# Patient Record
Sex: FEMALE | Race: WHITE | NOT HISPANIC OR LATINO | URBAN - METROPOLITAN AREA
[De-identification: names, ages, dates, MRNs, and addresses within clinical notes are randomized per-mention and may not be internally consistent; named-entity substitution may affect disease eponyms.]

---

## 2020-11-16 ENCOUNTER — INPATIENT (INPATIENT)
Facility: HOSPITAL | Age: 75
LOS: 8 days | Discharge: HOME HEALTH | DRG: 260 | End: 2020-11-25
Attending: STUDENT IN AN ORGANIZED HEALTH CARE EDUCATION/TRAINING PROGRAM | Admitting: STUDENT IN AN ORGANIZED HEALTH CARE EDUCATION/TRAINING PROGRAM
Payer: MEDICARE

## 2020-11-16 ENCOUNTER — TRANSCRIPTION ENCOUNTER (OUTPATIENT)
Age: 75
End: 2020-11-16

## 2020-11-16 VITALS
HEIGHT: 62 IN | DIASTOLIC BLOOD PRESSURE: 102 MMHG | RESPIRATION RATE: 18 BRPM | OXYGEN SATURATION: 97 % | WEIGHT: 199.96 LBS | SYSTOLIC BLOOD PRESSURE: 194 MMHG | HEART RATE: 68 BPM | TEMPERATURE: 97 F

## 2020-11-16 DIAGNOSIS — Z90.49 ACQUIRED ABSENCE OF OTHER SPECIFIED PARTS OF DIGESTIVE TRACT: Chronic | ICD-10-CM

## 2020-11-16 DIAGNOSIS — I48.91 UNSPECIFIED ATRIAL FIBRILLATION: ICD-10-CM

## 2020-11-16 DIAGNOSIS — Z29.9 ENCOUNTER FOR PROPHYLACTIC MEASURES, UNSPECIFIED: ICD-10-CM

## 2020-11-16 DIAGNOSIS — E03.9 HYPOTHYROIDISM, UNSPECIFIED: ICD-10-CM

## 2020-11-16 DIAGNOSIS — T82.9XXA UNSPECIFIED COMPLICATION OF CARDIAC AND VASCULAR PROSTHETIC DEVICE, IMPLANT AND GRAFT, INITIAL ENCOUNTER: ICD-10-CM

## 2020-11-16 DIAGNOSIS — L03.90 CELLULITIS, UNSPECIFIED: ICD-10-CM

## 2020-11-16 DIAGNOSIS — E78.5 HYPERLIPIDEMIA, UNSPECIFIED: ICD-10-CM

## 2020-11-16 DIAGNOSIS — I10 ESSENTIAL (PRIMARY) HYPERTENSION: ICD-10-CM

## 2020-11-16 DIAGNOSIS — T82.7XXA INFECTION AND INFLAMMATORY REACTION DUE TO OTHER CARDIAC AND VASCULAR DEVICES, IMPLANTS AND GRAFTS, INITIAL ENCOUNTER: ICD-10-CM

## 2020-11-16 DIAGNOSIS — Z95.0 PRESENCE OF CARDIAC PACEMAKER: Chronic | ICD-10-CM

## 2020-11-16 LAB
ALBUMIN SERPL ELPH-MCNC: 3.9 G/DL — SIGNIFICANT CHANGE UP (ref 3.3–5)
ALP SERPL-CCNC: 78 U/L — SIGNIFICANT CHANGE UP (ref 40–120)
ALT FLD-CCNC: 14 U/L — SIGNIFICANT CHANGE UP (ref 10–45)
ANION GAP SERPL CALC-SCNC: 11 MMOL/L — SIGNIFICANT CHANGE UP (ref 5–17)
APTT BLD: 33.1 SEC — SIGNIFICANT CHANGE UP (ref 27.5–35.5)
AST SERPL-CCNC: 17 U/L — SIGNIFICANT CHANGE UP (ref 10–40)
BASOPHILS # BLD AUTO: 0.04 K/UL — SIGNIFICANT CHANGE UP (ref 0–0.2)
BASOPHILS NFR BLD AUTO: 0.4 % — SIGNIFICANT CHANGE UP (ref 0–2)
BILIRUB SERPL-MCNC: 0.2 MG/DL — SIGNIFICANT CHANGE UP (ref 0.2–1.2)
BLD GP AB SCN SERPL QL: NEGATIVE — SIGNIFICANT CHANGE UP
BUN SERPL-MCNC: 21 MG/DL — SIGNIFICANT CHANGE UP (ref 7–23)
CALCIUM SERPL-MCNC: 10.1 MG/DL — SIGNIFICANT CHANGE UP (ref 8.4–10.5)
CHLORIDE SERPL-SCNC: 105 MMOL/L — SIGNIFICANT CHANGE UP (ref 96–108)
CO2 SERPL-SCNC: 28 MMOL/L — SIGNIFICANT CHANGE UP (ref 22–31)
CREAT SERPL-MCNC: 1.17 MG/DL — SIGNIFICANT CHANGE UP (ref 0.5–1.3)
EOSINOPHIL # BLD AUTO: 0.13 K/UL — SIGNIFICANT CHANGE UP (ref 0–0.5)
EOSINOPHIL NFR BLD AUTO: 1.3 % — SIGNIFICANT CHANGE UP (ref 0–6)
GLUCOSE SERPL-MCNC: 117 MG/DL — HIGH (ref 70–99)
HCT VFR BLD CALC: 43.1 % — SIGNIFICANT CHANGE UP (ref 34.5–45)
HGB BLD-MCNC: 14.1 G/DL — SIGNIFICANT CHANGE UP (ref 11.5–15.5)
IMM GRANULOCYTES NFR BLD AUTO: 0.5 % — SIGNIFICANT CHANGE UP (ref 0–1.5)
INR BLD: 1.22 RATIO — HIGH (ref 0.88–1.16)
LYMPHOCYTES # BLD AUTO: 2.22 K/UL — SIGNIFICANT CHANGE UP (ref 1–3.3)
LYMPHOCYTES # BLD AUTO: 22.6 % — SIGNIFICANT CHANGE UP (ref 13–44)
MCHC RBC-ENTMCNC: 30.1 PG — SIGNIFICANT CHANGE UP (ref 27–34)
MCHC RBC-ENTMCNC: 32.7 GM/DL — SIGNIFICANT CHANGE UP (ref 32–36)
MCV RBC AUTO: 92.1 FL — SIGNIFICANT CHANGE UP (ref 80–100)
MONOCYTES # BLD AUTO: 0.81 K/UL — SIGNIFICANT CHANGE UP (ref 0–0.9)
MONOCYTES NFR BLD AUTO: 8.3 % — SIGNIFICANT CHANGE UP (ref 2–14)
NEUTROPHILS # BLD AUTO: 6.56 K/UL — SIGNIFICANT CHANGE UP (ref 1.8–7.4)
NEUTROPHILS NFR BLD AUTO: 66.9 % — SIGNIFICANT CHANGE UP (ref 43–77)
NRBC # BLD: 0 /100 WBCS — SIGNIFICANT CHANGE UP (ref 0–0)
PLATELET # BLD AUTO: 331 K/UL — SIGNIFICANT CHANGE UP (ref 150–400)
POTASSIUM SERPL-MCNC: 4.3 MMOL/L — SIGNIFICANT CHANGE UP (ref 3.5–5.3)
POTASSIUM SERPL-SCNC: 4.3 MMOL/L — SIGNIFICANT CHANGE UP (ref 3.5–5.3)
PROT SERPL-MCNC: 6.5 G/DL — SIGNIFICANT CHANGE UP (ref 6–8.3)
PROTHROM AB SERPL-ACNC: 14.5 SEC — HIGH (ref 10.6–13.6)
RBC # BLD: 4.68 M/UL — SIGNIFICANT CHANGE UP (ref 3.8–5.2)
RBC # FLD: 12.7 % — SIGNIFICANT CHANGE UP (ref 10.3–14.5)
RH IG SCN BLD-IMP: POSITIVE — SIGNIFICANT CHANGE UP
SARS-COV-2 RNA SPEC QL NAA+PROBE: SIGNIFICANT CHANGE UP
SODIUM SERPL-SCNC: 144 MMOL/L — SIGNIFICANT CHANGE UP (ref 135–145)
WBC # BLD: 9.81 K/UL — SIGNIFICANT CHANGE UP (ref 3.8–10.5)
WBC # FLD AUTO: 9.81 K/UL — SIGNIFICANT CHANGE UP (ref 3.8–10.5)

## 2020-11-16 PROCEDURE — 99223 1ST HOSP IP/OBS HIGH 75: CPT | Mod: GC

## 2020-11-16 PROCEDURE — 93010 ELECTROCARDIOGRAM REPORT: CPT

## 2020-11-16 PROCEDURE — 99223 1ST HOSP IP/OBS HIGH 75: CPT | Mod: 57

## 2020-11-16 PROCEDURE — 99285 EMERGENCY DEPT VISIT HI MDM: CPT | Mod: CS

## 2020-11-16 PROCEDURE — 71045 X-RAY EXAM CHEST 1 VIEW: CPT | Mod: 26

## 2020-11-16 RX ORDER — FUROSEMIDE 40 MG
30 TABLET ORAL DAILY
Refills: 0 | Status: DISCONTINUED | OUTPATIENT
Start: 2020-11-17 | End: 2020-11-17

## 2020-11-16 RX ORDER — LOSARTAN POTASSIUM 100 MG/1
100 TABLET, FILM COATED ORAL DAILY
Refills: 0 | Status: DISCONTINUED | OUTPATIENT
Start: 2020-11-16 | End: 2020-11-17

## 2020-11-16 RX ORDER — VANCOMYCIN HCL 1 G
1000 VIAL (EA) INTRAVENOUS ONCE
Refills: 0 | Status: COMPLETED | OUTPATIENT
Start: 2020-11-16 | End: 2020-11-17

## 2020-11-16 RX ORDER — VANCOMYCIN HCL 1 G
VIAL (EA) INTRAVENOUS
Refills: 0 | Status: DISCONTINUED | OUTPATIENT
Start: 2020-11-17 | End: 2020-11-17

## 2020-11-16 RX ORDER — PIPERACILLIN AND TAZOBACTAM 4; .5 G/20ML; G/20ML
3.38 INJECTION, POWDER, LYOPHILIZED, FOR SOLUTION INTRAVENOUS ONCE
Refills: 0 | Status: COMPLETED | OUTPATIENT
Start: 2020-11-16 | End: 2020-11-16

## 2020-11-16 RX ORDER — PIPERACILLIN AND TAZOBACTAM 4; .5 G/20ML; G/20ML
3.38 INJECTION, POWDER, LYOPHILIZED, FOR SOLUTION INTRAVENOUS EVERY 8 HOURS
Refills: 0 | Status: DISCONTINUED | OUTPATIENT
Start: 2020-11-16 | End: 2020-11-17

## 2020-11-16 RX ORDER — LEVOTHYROXINE SODIUM 125 MCG
75 TABLET ORAL
Refills: 0 | Status: DISCONTINUED | OUTPATIENT
Start: 2020-11-16 | End: 2020-11-17

## 2020-11-16 RX ORDER — DIGOXIN 250 MCG
0.25 TABLET ORAL DAILY
Refills: 0 | Status: DISCONTINUED | OUTPATIENT
Start: 2020-11-16 | End: 2020-11-17

## 2020-11-16 RX ORDER — PITAVASTATIN CALCIUM 1.04 MG/1
1 TABLET, FILM COATED ORAL
Qty: 0 | Refills: 0 | DISCHARGE

## 2020-11-16 RX ORDER — LEVOTHYROXINE SODIUM 125 MCG
50 TABLET ORAL
Refills: 0 | Status: DISCONTINUED | OUTPATIENT
Start: 2020-11-16 | End: 2020-11-17

## 2020-11-16 RX ORDER — VANCOMYCIN HCL 1 G
1000 VIAL (EA) INTRAVENOUS EVERY 12 HOURS
Refills: 0 | Status: DISCONTINUED | OUTPATIENT
Start: 2020-11-17 | End: 2020-11-17

## 2020-11-16 RX ORDER — TEMAZEPAM 15 MG/1
30 CAPSULE ORAL AT BEDTIME
Refills: 0 | Status: DISCONTINUED | OUTPATIENT
Start: 2020-11-16 | End: 2020-11-17

## 2020-11-16 NOTE — ED PROVIDER NOTE - CLINICAL SUMMARY MEDICAL DECISION MAKING FREE TEXT BOX
Catrina Gann MD - Attending Physician: Pt here with swelling/redness at pacer site. No chest pain, palpitations, fevers. No drainage or tenderness. Admit for pacer removal and replacement Catrina Gann MD - Attending Physician: Pt here with swelling/redness at pacer site. No chest pain, palpitations, fevers. No active drainage or tenderness. Admit for pacer removal and replacement

## 2020-11-16 NOTE — H&P ADULT - HISTORY OF PRESENT ILLNESS
75y F PMHx HTN, HLD, hypothyroid, afib (on eliquis) p/w pacemaker site infection. She had a R sided dual chamber PPM placed 2.5 years ago by a Dr. Moroe for bradycardia to 29, otherwise asymptomatic. She was without complications until 10/30 when her cardiology office [place] received a transmission from her PPM that showed "something wrong". She was advised to get the PPM evaluated and saw a Dr. Kent who noted one of the wires was loose. On11/5, he elected to put in a new wire and leave the loose wire in. However, a few days after that procedure the pt started noticing purulent discharge from the pacemaker incision site and presented to [facility]. She was prescribed a 3 day course of clindamycin but the purulent drainage did not resolved. She then called went back to [place] and received a 20 day course of levofloxacin. However, on 11/14 she noticed acute sharp R foot pain, denies trauma, bleeding, or discharge. She called Dr. Moore's office who recommended she come to [facility] for PPM removal and further evaluation.     ED course:   afebrile, HR 68, /102, RR 18, 97% on RA  Received CXR 75y F PMHx HTN, HLD, hypothyroid, afib (on eliquis) p/w pacemaker site infection. She had a R sided dual chamber PPM placed 2.5 years ago by a Dr. Moore for bradycardia to 29, otherwise asymptomatic. She was without complications until 10/30 when her cardiology office [place] received a transmission from her PPM that showed "something wrong". She was advised to get the PPM evaluated and saw a Dr. Kent who noted one of the wires was loose. On11/5, he elected to put in a new wire and leave the loose wire in. However, a few days after that procedure the pt started noticing purulent discharge from the pacemaker incision site and presented to [facility]. She was prescribed a 3 day course of clindamycin but the purulent drainage did not resolved. She then called went back to [place] and received a 20 day course of levofloxacin. However, on 11/14 she noticed acute sharp R foot pain, denies trauma, bleeding, or discharge. She called Dr. Moore's office who recommended she come to [facility] for PPM removal and further evaluation.     Of note, pt was advised to stop taking her eliquis and diltiazem on 11/13 as she may get a procedure so she has not taken these medications since 11/13 pm.     ED course:   afebrile, HR 68, /102, RR 18, 97% on RA  Received CXR 75y F PMHx HTN, HLD, hypothyroid, afib (on eliquis), 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 She had a R sided dual chamber PPM placed 2.5 years ago by a Dr. Moore for bradycardia to 29, otherwise asymptomatic. She was without complications until 10/30 when her cardiology office [place] received a transmission from her PPM that showed "something wrong". She was advised to get the PPM evaluated and saw a Dr. Kent who noted one of the wires was loose. On11/5, he elected to put in a new wire and leave the loose wire in. However, a few days after that procedure the pt started noticing purulent discharge from the pacemaker incision site and presented to [facility]. She was prescribed a 3 day course of clindamycin but the purulent drainage did not resolved. She then called went back to [place] and received a 20 day course of levofloxacin. However, on 11/14 she noticed acute sharp R foot pain, denies trauma, bleeding, or discharge. She called Dr. Moore's office who recommended she come to [facility] for PPM removal and further evaluation.     Of note, pt was advised to stop taking her eliquis and diltiazem on 11/13 as she may get a procedure so she has not taken these medications since 11/13 pm.     ED course:   afebrile, HR 68, /102, RR 18, 97% on RA  Received CXR 75y F PMHx HTN, HLD, hypothyroid, afib (on eliquis), 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 She had a R sided dual chamber PPM placed 2.5 years ago by a Dr. Moore for bradycardia to 29, otherwise asymptomatic. She was without complications until 10/30 when her cardiology office [place] received a transmission from her PPM that showed "something wrong". She was advised to get the PPM evaluated and saw a Dr. Kent who noted one of the wires was loose. On11/5, he elected to put in a new wire and leave the loose wire in. However, a few days after that procedure the pt started noticing purulent discharge from the pacemaker incision site and presented to [facility]. She was prescribed a 3 day course of clindamycin but the purulent drainage did not resolved. She then called went back to [place] and received a 20 day course of levofloxacin. However, on 11/14 she noticed acute sharp L foot pain, denies trauma, bleeding, or discharge. She called Dr. Moore's office who recommended she come to [facility] for PPM removal and further evaluation.     Of note, pt was advised to stop taking her eliquis and diltiazem on 11/13 as she may get a procedure so she has not taken these medications since 11/13 pm.     ED course:   afebrile, HR 68, /102, RR 18, 97% on RA  Received CXR 75y F PMHx HTN, HLD, hypothyroid, afib (on eliquis), 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 She had a R sided dual chamber PPM placed 2.5 years ago by a Dr. Moore for sinus bradycardia to 29, otherwise asymptomatic. She was without complications until 10/30 when her cardiology office [place] received a transmission from her PPM that showed "something wrong". She was advised to get the PPM evaluated and saw a Dr. Kent who noted one of the wires was loose. On11/5, he elected to put in a new wire and leave the loose wire in. However, a few days after that procedure the pt started noticing purulent discharge from the pacemaker incision site and presented to [facility]. She was prescribed a 3 day course of clindamycin but the purulent drainage did not resolved. She then called went back to [place] and has since completed 5 out of 20 day course of levofloxacin. On 11/14 she noticed acute sharp L 1st toe foot pain. denies trauma, bleeding, or discharge. Pt reports no hx of gout or pseudogout. She called Dr. Moore's office who recommended she come to [facility] for PPM removal and further evaluation. Pt still reports drainage from the ppm incision site.    Of note, pt was advised to stop taking her eliquis and diltiazem on 11/13 as she may get a procedure so she has not taken these medications since 11/13 pm.     ED course:   afebrile, HR 68, /102, RR 18, 97% on RA  Received CXR

## 2020-11-16 NOTE — H&P ADULT - PROBLEM SELECTOR PLAN 6
- pt takes livalo (pitavastatin) as she is intolerant to other statins- advised pt to have her daughter bring in her home dose to be verified with pharmacy and then administered - pt takes livalo (pitavastatin) as she is intolerant to other statins- advised pt to have her daughter bring in her home dose to be verified with pharmacy and then administered  - otherwise restart as outpt

## 2020-11-16 NOTE — H&P ADULT - ATTENDING COMMENTS
Pt seen and examined. Case discussed with resident. H&P reviewed and edited where appropriate. Pt seen and examined. Case discussed with resident. H&P reviewed and edited where appropriate..

## 2020-11-16 NOTE — ED ADULT NURSE NOTE - CHIEF COMPLAINT QUOTE
sent in for pacemaker replacement after infected pacemaker wire, sent in by Dr. Enoc Moore (EP); finished multiple abx.       PCP: Jeremy Blanton ([place])

## 2020-11-16 NOTE — H&P ADULT - PROBLEM SELECTOR PLAN 2
Erythema, purulent drainage from PPM site. Pt does not meet SIRS criteria. s/p clindamycin 3d, levofloxacin 5d  - c/w Abx as above  - trend WBC, fevers Erythema, purulent drainage from PPM site. Pt does not meet SIRS criteria. s/p clindamycin 3d, levofloxacin 5d  - f/u blood cx  - c/w Abx as above  - trend WBC, fevers L first metatarsal joint pain with erythema, possible gout vs pseudogout. DDx includes endocarditis given infected pacemaker pocket, recent instrumentation- less likely given pt's vitals otherwise stable, no leukocytosis, no murmur on exam, and no SOB  - pain regimen: tylenol 650 mg q6h prn  - ?f/u EP recs for NEVILLE to r/o endocarditis L first metatarsal joint pain with erythema, possible gout vs pseudogout. DDx includes less likely endocarditis given infected pacemaker pocket, recent instrumentation- pt afebrile, vitals otherwise stable, no leukocytosis, no murmur on exam, and no SOB  - pain regimen: tylenol 650 mg q6h prn  - ?f/u EP recs for NEVILLE to r/o endocarditis  - rheum consult in AM for further eval of toe  - percocet prn for pain

## 2020-11-16 NOTE — ED ADULT NURSE NOTE - OBJECTIVE STATEMENT
Patient is a 75 year old female coming into the ED via private car complaining of infected pacemaker. A&Ox4 speaking in full sentences. Patient states her MD Dr. Moore sent her to the ED because her pacemaker lead wires became infected. Patient reports her MD plans on taking her to surgery tomorrow and told her to come through the ED. Upon inspection patients right chest wall where pacemaker was inserted is red, edematous, and has pus. Patient denies pain at the right chest wall site. Bilateral breath sounds clear. No edema noted, capillary refill <2 seconds. Patient also reports new redness on the left lateral foot. Patient denies pain. Patient denies headache, chest pain, shortness of breath, N/V/D, urinary frequency/burning/hematuria.

## 2020-11-16 NOTE — H&P ADULT - ASSESSMENT
75y F PMHx HTN, HLD, hypothyroid, afib (on eliquis) p/w pacemaker site infection with c/f endocarditis. 75y F PMHx HTN, HLD, hypothyroid, afib (on eliquis), sinus 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 75y F PMHx HTN, HLD, hypothyroid, afib (on eliquis), sinus bradycardia s/p PPM p/w pacemaker site infection and toe pain.

## 2020-11-16 NOTE — ED ADULT TRIAGE NOTE - CHIEF COMPLAINT QUOTE
sent in for pacemaker replacement after infected pacemaker wire, sent in by Dr. Enoc Moore (EP); finished multiple abx.       PCP: Jeremy Blanton ([place])

## 2020-11-16 NOTE — ED PROVIDER NOTE - OBJECTIVE STATEMENT
Pt here for erythema/swelling at pacemaker site. See by Dr Moore and sent here. No chest pain, fevers, shortness of breath. No palpitations or dizziness. sent in for admission for pacer removal and replacement Pt here for erythema/swelling at pacemaker site. H/o bradycardia, s/p Pacer. Had lead migration and was wire was replaced recently. No with swelling and drainage. On abx, but continues to have erythema/swelling. Seen by Dr Moore and sent here. No chest pain, fevers, shortness of breath. No palpitations or dizziness. sent in for admission for pacer removal and replacement

## 2020-11-16 NOTE — H&P ADULT - PROBLEM SELECTOR PLAN 5
- c/w losartan 100 mg qd  - c/w lasix 30 mg qd  - pt takes coreg BID but unsure of dose- will have daughter bring in for pharmacy to verify and then administered - c/w coreg 3.125 mg BID  - c/w losartan 100 mg qd  - c/w lasix 30 mg qd

## 2020-11-16 NOTE — CONSULT NOTE ADULT - ASSESSMENT
75F with HTN, HLD, hypothyroid, afib (on eliquis) p/w pacemaker site infection.     Pacemaker site infection: Patient has a [place] PPM with RA and RV lead. Patient has an abandoned RA and abandoned RV lead (so total of 2 RA and 2 RV leads). Original RA lead was fractured and abandoned, and RV lead failed, so was capped and abandoned as well. Patient's underlying rhythm is sinus bradycardia.   - blood cultures obtained in ED  - on vanc/zosyn for empiric coverage  - please keep patient NPO after midnight for possible procedure tomorrow - discussed with patient that she may or may not go tomorrow based on schedule  - continue to hold Xarelto and hold any DVT prophylaxis in the AM  - please obtain PT/PTT/INR and type and screen in the AM    Mackenzie Rider MD  Cardiology Fellow, PGY-4  [phone]  For all other Cardiology service contact information, go to [email or web address] and use "[email or web address]" to login.

## 2020-11-16 NOTE — ED PROVIDER NOTE - RAPID ASSESSMENT
74 y/o F presents after being sent by Dr. Ioana Moore for pacemaker replacement in right chest wall. Noted to have erythema and swelling around the site. Denies CP, SOB, palpitations, SOB, fever, or chills. Cardiology is aware.     Elieser Tim (MD) note: The scribe (Mandi Hood)'s documentation has been prepared under my direction and personally reviewed by me.  Patient was seen as a tele QDOC patient, a thorough physical exam was not performed. The patient will be seen and further worked up in the main emergency department and their care will be completed by the main emergency department team. Receiving team will follow up on labs, analgesia, any clinical imaging, reassess and disposition as clinically indicated, all decisions regarding the progression of care will be made at their discretion. 76 y/o F presents after being sent by Dr. Ioana Moore for pacemaker replacement in right chest wall. Noted to have erythema and swelling around the site. Denies CP, SOB, palpitations, SOB, fever, or chills. Discussed with Cardiology fellow who was awaiting patient and is aware patient is in Emergency Department, will follow.    Labs resulted, discussed with hospitalist who will admit patient.  Will still require full HPI/ROS and exam in main Emergency Department.    Elieser Tim (MD) note: The scribe (Mandi Hood)'s documentation has been prepared under my direction and personally reviewed by me.  Patient was seen as a tele QDOC patient, a thorough physical exam was not performed. The patient will be seen and further worked up in the main emergency department and their care will be completed by the main emergency department team. Receiving team will follow up on labs, analgesia, any clinical imaging, reassess and disposition as clinically indicated, all decisions regarding the progression of care will be made at their discretion.

## 2020-11-16 NOTE — H&P ADULT - NSHPSOCIALHISTORY_GEN_ALL_CORE
Retired    Lives at home with daughter    has 2 kids, both healthy    never smoker, socially drinks (up to 1 drink/week)  denies illicit drug use

## 2020-11-16 NOTE — H&P ADULT - PROBLEM SELECTOR PLAN 4
- on eliquis but pt has held doses (last taken 11/13) in anticipation of uncoming procedure  - resume eliquis as per cardiology recs - on eliquis but pt has held doses (last taken 11/13) in anticipation of upcoming procedure  - holding AC for cardiac procedure per EP recs  - resume eliquis as per cardiology recs - on eliquis but pt has held doses (last taken 11/13) in anticipation of upcoming procedure  - holding AC for cardiac procedure per EP recs  - c/w digoxin as per cardiology recs  - resume eliquis as per cardiology recs - pt held eliquis and dilt doses (last taken 11/13) in anticipation of upcoming procedure  - holding AC for cardiac procedure per EP recs  - c/w digoxin, coreg  - resume eliquis and dilt once cleared by cards

## 2020-11-16 NOTE — H&P ADULT - NSHPREVIEWOFSYSTEMS_GEN_ALL_CORE
REVIEW OF SYSTEMS:    CONSTITUTIONAL: No weakness, fevers or chills  EYES/ENT: No visual changes;  No vertigo or throat pain   NECK: No pain or stiffness  RESPIRATORY: No cough, wheezing, hemoptysis; No shortness of breath  CARDIOVASCULAR: No chest pain or palpitations  GASTROINTESTINAL: No abdominal or epigastric pain. No nausea, vomiting, or hematemesis; No diarrhea or constipation. No melena or hematochezia.  GENITOURINARY: No dysuria, frequency or hematuria  NEUROLOGICAL: No numbness or weakness  SKIN: +purulent drainage from PPM site. No itching, burning, rashes, or lesions   All other review of systems is negative unless indicated above. REVIEW OF SYSTEMS:    CONSTITUTIONAL: No weakness, fevers or chills  EYES/ENT: No visual changes;  No vertigo or throat pain   NECK: No pain or stiffness  RESPIRATORY: No cough, wheezing, hemoptysis; No shortness of breath  CARDIOVASCULAR: No chest pain or palpitations  GASTROINTESTINAL: No abdominal or epigastric pain. No nausea, vomiting, or hematemesis; No diarrhea or constipation. No melena or hematochezia.  GENITOURINARY: No dysuria, frequency or hematuria  NEUROLOGICAL: No numbness or weakness  SKIN: +previous purulent drainage from PPM site. +L foot pain. No itching, burning, rashes, or lesions   PSYCH: no anxiety, no depression  All other review of systems is negative unless indicated above.

## 2020-11-16 NOTE — ED PROVIDER NOTE - CARDIAC, MLM
Normal rate, regular rhythm.  Heart sounds S1, S2.  No murmurs, rubs or gallops. Swelling and mild erythema at pacemaker site, no fluctuance, no drainage. Steri-strips in place. Nontender

## 2020-11-16 NOTE — CHART NOTE - NSCHARTNOTEFT_GEN_A_CORE
Reference #: [id number]    Patient Name: Sharri Guzman Date: 1945  Address: [address] [place] [id number]: Female  Rx Written	Rx Dispensed	Drug	Quantity	Days Supply	Prescriber Name	Payment Method	Dispenser  10/19/2020	10/23/2020	temazepam 30 mg capsule	30	30	Harvinder Agastin	Insurance	[facility] #[id number]  09/21/2020	09/23/2020	temazepam 30 mg capsule	30	30	Harvinder Agastin	[place]	[facility] #[id number]  08/27/2020	08/29/2020	oxycodone-acetaminophen 5-325 mg tablet	48	8	Brennen Rosales	Medicare	[facility] #[id number]  08/21/2020	08/21/2020	oxycodone hcl 5 mg tablet	28	7	Sadi Allen P	Insurance	[facility] #[id number]  08/21/2020	08/21/2020	diazepam 5 mg tablet	15	5	Sdai Allen P	[place]	[facility] #[id number]  07/31/2020	07/31/2020	tramadol hcl 50 mg tablet	60	10	Radames Martin PA	Insurance	[facility] #[id number]  07/30/2020	07/31/2020	temazepam 30 mg capsule	30	30	Harvinder Agastin	[place]	[facility] #[id number]  07/15/2020	07/15/2020	tramadol hcl 50 mg tablet	60	10	Artemio Barroso	Insurance	[facility] #[id number]  07/02/2020	07/02/2020	temazepam 30 mg capsule	30	30	Harvinder, Agastin	[place]	[facility] #[id number]  05/29/2020	06/02/2020	temazepam 30 mg capsule	30	30	Harvinder, Agastin	[place]	[facility] #[id number]  05/04/2020	05/04/2020	temazepam 30 mg capsule	30	30	Harvinder, Agastin	[place]	[facility] #[id number]  04/08/2020	04/14/2020	hydrocodone-acetaminophen 5-325 mg tablet	90	30	Nichols, Cy	Insurance	[facility] #[id number]  04/07/2020	04/07/2020	hydrocodone-acetaminophen 5-325 mg tablet	14	7	Nichols, Cy	Insurance	[facility] #[id number]  03/31/2020	04/02/2020	temazepam 30 mg capsule	30	30	Harvinder, Agastin	Insurance	[facility] #[id number]  02/28/2020	03/02/2020	temazepam 30 mg capsule	30	30	Harvinder, Agastin	Insurance	[facility] #[id number]  01/02/2020	01/03/2020	temazepam 30 mg capsule	30	30	Jeremy Blanton	[facility] #[id number]  11/27/2019	12/02/2019	temazepam 30 mg capsule	30	30	[facility] #[id number]

## 2020-11-16 NOTE — H&P ADULT - PROBLEM SELECTOR PLAN 3
c/f endocarditis given infected pacemaker pocket, recent instrumentation, and new onset sharp R foot pain with erythema c/f septic emboli. However, less likely given pt's vitals otherwise stable, no leukocytosis, no murmur on exam, and no SOB  - f/u cardiology recs regarding NEVILLE   - c/w ABx as above Pt currently asymtpomatic  - f/u EP recs regarding new PPM placement Pt currently asymtpomatic, in a-paced rhythm  - ?sinus node dysfxn  - f/u ep recs and need for interrogation

## 2020-11-16 NOTE — H&P ADULT - PROBLEM SELECTOR PLAN 1
likely PPM site infection given recent instrumentation, insertion of new wire. Incision site is erythematous with purulent drainage but pt not meeting sepsis criteria  - cardiology aware pt is here- appreciate recs  - start vanc/zosyn (11/16- ) likely PPM site infection given recent instrumentation, insertion of new wire. Incision site is erythematous with purulent drainage but pt not meeting sepsis criteria  - EP following, plan for PPM removal/exchange 11/17  - f/u blood cx  - start vanc/zosyn (11/16- ) likely PPM site infection given recent instrumentation, insertion of new wire. Incision site is erythematous with reportedly purulent drainage but pt not meeting sepsis criteria. s/p clindamycin 3d, levofloxacin 5d. Now awaiting revision per EP.   - EP following, plan for PPM removal/exchange 11/17  - f/u blood cx  - start vanc/zosyn (11/16- )  - ?f/u EP recs for NEVILLE to r/o endocarditis  - trend WBC, fevers  - consider ID consult if infection does not resolve with current regimen/EP intervention likely PPM site infection given recent instrumentation, insertion of new wire. Incision site is erythematous with reportedly purulent drainage but pt not meeting sepsis criteria. s/p clindamycin 3d, levofloxacin 5d. Now awaiting revision per EP.   - EP following, plan for PPM removal/exchange 11/17  - f/u blood cx  - start vanc/zosyn (11/16- )  - ?f/u EP recs for need for NEVILLE to r/o endocarditis  - trend WBC, fevers

## 2020-11-16 NOTE — ED PROVIDER NOTE - CHIEF COMPLAINT
The patient is a 75y Female complaining of The patient is a 75y Female complaining of Pacer malfunction

## 2020-11-16 NOTE — H&P ADULT - PROBLEM SELECTOR PLAN 7
- f/u TSH  - pt takes levothyroxine 50 mcg one day followed by 75 mcg the next day  - c/w current regimen - f/u TSH  - pt takes levothyroxine 50 mcg one day followed by 75 mcg the next day  - c/w as per pt's home regimen

## 2020-11-16 NOTE — H&P ADULT - NSHPLABSRESULTS_GEN_ALL_CORE
11-16    144  |  105  |  21  ----------------------------<  117<H>  4.3   |  28  |  1.17    Ca    10.1      16 Nov 2020 19:14    TPro  6.5  /  Alb  3.9  /  TBili  0.2  /  DBili  x   /  AST  17  /  ALT  14  /  AlkPhos  78  11-16    PT/INR - ( 16 Nov 2020 19:14 )   PT: 14.5 sec;   INR: 1.22 ratio       PTT - ( 16 Nov 2020 19:14 )  PTT:33.1 sec                           14.1   9.81  )-----------( 331      ( 16 Nov 2020 19:14 )             43.1     EKG 11/16/20:  atrial paced rhythm with prolonged AV conduction  Right bundle branch block  Abnormal EKG Personally reviewed imaging, labs, ekg.    11-16  144  |  105  |  21  ----------------------------<  117<H>  4.3   |  28  |  1.17    Ca    10.1      16 Nov 2020 19:14    TPro  6.5  /  Alb  3.9  /  TBili  0.2  /  DBili  x   /  AST  17  /  ALT  14  /  AlkPhos  78  11-16    PT/INR - ( 16 Nov 2020 19:14 )   PT: 14.5 sec;   INR: 1.22 ratio       PTT - ( 16 Nov 2020 19:14 )  PTT:33.1 sec                           14.1   9.81  )-----------( 331      ( 16 Nov 2020 19:14 )             43.1     EKG 11/16/20:  atrial paced rhythm with prolonged AV conduction  Right bundle branch block  Abnormal EKG

## 2020-11-16 NOTE — CONSULT NOTE ADULT - SUBJECTIVE AND OBJECTIVE BOX
Patient seen and evaluated at bedside    Chief Complaint:    HPI:  75F with HTN, HLD, hypothyroid, afib (on eliquis) p/w pacemaker site infection.     The patient originally had a [employer]onic (or possibly Biotronik) PPM placed 10-12 years ago in [place], however she required a generator change, so now she has a [employer] PPM that was placed June 2018. She does not know if the leads were exchanged as well at that time. She was told on 10/30 from a remote device check that something was wrong, so she was Dr. Kent (EP) who stated that the "wire was loose", so he placed a new RV lead, and left the old lead in. A few days later, the patient started having purulent discharge and some pain at her pacemaker site. She went to [facility], and was prescribed a 3 day course of clinda, without improvement. She returned, and then received a 20 day course of levaquin. 2 days prior to admission, she then started having left foot pain near her 1st metatarsal. She was then told to go to [facility] for her device infection / told Dr. Moore would take out her device. Currently she is still having L foot pain, which is worse than her pacemaker site pain. Of note, patient has not taken her Xarelto or diltiazem since Thursday in preparation for this procedure.     ED course:   afebrile, HR 68, /102, RR 18, 97% on RA  Received CXR       Outpatient cardiologist: Dr. Kent (EP)    PMHx:   Hypothyroid    Afib    HLD (hyperlipidemia)    HTN (hypertension)    PSHx:   History of appendectomy    H/O cardiac pacemaker    Allergies:  latex (Rash)  No Known Drug Allergies      Home Meds:  livalo  synthroid 50/75  valsartan 160  coreg 3.125  temazepam 30  digoxin 250 mcg 5 days a week  terazosin  lasix 20 qd     Current Medications:       FAMILY HISTORY:  FHx: Alzheimer&#x27;s disease    FHx: myocardial infarction    Social History: retired .  Smoking History: never  Alcohol Use: social  Drug Use: none    REVIEW OF SYSTEMS:  CONSTITUTIONAL: No weakness, fevers or chills  EYES/ENT: No visual changes;  No dysphagia  NECK: No pain or stiffness  RESPIRATORY: No cough, wheezing, hemoptysis; No shortness of breath  CARDIOVASCULAR: +chest wall tenderness, no palpitations; No lower extremity edema  GASTROINTESTINAL: No abdominal or epigastric pain. No nausea, vomiting, or hematemesis; No diarrhea or constipation. No melena or hematochezia.  BACK: No back pain  MSK: + L foot pain  GENITOURINARY: No dysuria, frequency or hematuria  NEUROLOGICAL: No numbness or weakness  SKIN: No itching, burning, rashes, or lesions   All other review of systems is negative unless indicated above.    Physical Exam:  T(F): 98.5 (11-16), Max: 98.5 (11-16)  HR: 59 (11-16) (59 - 68)  BP: 163/92 (11-16) (163/92 - 194/102)  RR: 17 (11-16)  SpO2: 100% (11-16)  GENERAL: No acute distress, well-developed  HEAD:  Atraumatic, Normocephalic  ENT: EOMI, PERRLA, conjunctiva and sclera clear, Neck supple, No JVD, moist mucosa  CHEST/LUNG: Clear to auscultation bilaterally; No wheeze, equal breath sounds bilaterally. Erythema of R chest wall, inferolateral to pacemaker generator, without active drainage  BACK: No spinal tenderness  HEART: Regular rate and rhythm; No murmurs, rubs, or gallops  ABDOMEN: Soft, Nontender, Nondistended; Bowel sounds present  EXTREMITIES:  No clubbing, cyanosis, or edema. Warmth and erythema of L 1st metatarsal  PSYCH: Nl behavior, nl affect  NEUROLOGY: AAOx3, non-focal, cranial nerves intact  SKIN: Normal color, No rashes or lesions  LINES:    Cardiovascular Diagnostic Testing:    ECG: Personally reviewed: AV paced    CXR: Personally reviewed - appears to have 2 RA and 2 RV leads.     Labs: Personally reviewed                        14.1   9.81  )-----------( 331      ( 16 Nov 2020 19:14 )             43.1     11-16    144  |  105  |  21  ----------------------------<  117<H>  4.3   |  28  |  1.17    Ca    10.1      16 Nov 2020 19:14    TPro  6.5  /  Alb  3.9  /  TBili  0.2  /  DBili  x   /  AST  17  /  ALT  14  /  AlkPhos  78  11-16    PT/INR - ( 16 Nov 2020 19:14 )   PT: 14.5 sec;   INR: 1.22 ratio         PTT - ( 16 Nov 2020 19:14 )  PTT:33.1 sec

## 2020-11-16 NOTE — H&P ADULT - NSHPPHYSICALEXAM_GEN_ALL_CORE
Vital Signs Last 24 Hrs  T(C): 36.3 (16 Nov 2020 18:43), Max: 36.3 (16 Nov 2020 18:43)  T(F): 97.4 (16 Nov 2020 18:43), Max: 97.4 (16 Nov 2020 18:43)  HR: 68 (16 Nov 2020 18:43) (68 - 68)  BP: 194/102 (16 Nov 2020 18:43) (194/102 - 194/102)  BP(mean): --  RR: 18 (16 Nov 2020 18:43) (18 - 18)  SpO2: 97% (16 Nov 2020 18:43) (97% - 97%)    CONSTITUTIONAL: No acute distress. Awake and alert.  HEAD: No evidence of trauma. Structures WNL.  EYES: +PERRL. +EOMI. No scleral icterus. No conjunctival injection.  ENT: Moist oral mucosa. No erythema. No pharyngeal exudates.   NECK: Supple. Appropriate ROM. No stiffness. No masses or lymphadenopathy.  RESPIRATORY: CTAB. No wheezes, rales, or rhonchi. No accessory muscle use. No apparent respiratory distress.  CARDIOVASCULAR: +S1/S2. No audible S3/S4. Regular rate and rhythm. No murmurs, rubs, or gallops. 2+ radial pulses x b/l UE; 2+ DP pulses x b/l LE.   GASTROINTESTINAL: Soft, nontender, nondistended. +BS. No rebound or guarding.   BACK: No spinal or paraspinal tenderness. No CVA tenderness.  EXTREMITY: +R distal medial foot has area of erythema, tender to palpation. No LE swelling or edema. EXTs warm to touch.  MUSCULOSKELETAL: Spontaneous movement in all extremities.  DERMATOLOGICAL: +R anterior chest wall pacemaker site incision has surrounding erythema, brownish drainage noted. Covered in soaked steri strips. No area of fluctuance noted on exam, nontender to palpation. No abnormal rashes or lesions.  NEUROLOGICAL: CN 2-12 grossly intact. No focal deficits. Sensation intact x 4EXT. A&Ox3 (oriented to person, place, and time).  PSYCHIATRIC: Appropriate affect. Vital Signs Last 24 Hrs  T(C): 36.3 (16 Nov 2020 18:43), Max: 36.3 (16 Nov 2020 18:43)  T(F): 97.4 (16 Nov 2020 18:43), Max: 97.4 (16 Nov 2020 18:43)  HR: 68 (16 Nov 2020 18:43) (68 - 68)  BP: 194/102 (16 Nov 2020 18:43) (194/102 - 194/102)  BP(mean): --  RR: 18 (16 Nov 2020 18:43) (18 - 18)  SpO2: 97% (16 Nov 2020 18:43) (97% - 97%)    CONSTITUTIONAL: No acute distress. Awake and alert.  HEAD: No evidence of trauma. Structures WNL.  EYES: +PERRL. +EOMI. No scleral icterus. No conjunctival injection.  ENT: Moist oral mucosa. No erythema. No pharyngeal exudates.   NECK: Supple. Appropriate ROM. No stiffness. No masses or lymphadenopathy.  RESPIRATORY: CTAB. No wheezes, rales, or rhonchi. No accessory muscle use. No apparent respiratory distress.  CARDIOVASCULAR: +S1/S2. No audible S3/S4. Regular rate and rhythm. No murmurs, rubs, or gallops. 2+ radial pulses x b/l UE; 2+ DP pulses x b/l LE.   GASTROINTESTINAL: Soft, nontender, nondistended. +BS. No rebound or guarding.   BACK: No spinal or paraspinal tenderness. No CVA tenderness.  EXTREMITY: +L distal medial foot has area of erythema, tender to palpation. No LE swelling or edema. EXTs warm to touch.  MUSCULOSKELETAL: Spontaneous movement in all extremities.  DERMATOLOGICAL: +R anterior chest wall pacemaker site incision has surrounding erythema, minor brownish drainage noted. Covered in soaked steri strips. No area of fluctuance noted on exam, nontender to palpation. No abnormal rashes or lesions.  NEUROLOGICAL: CN 2-12 grossly intact. No focal deficits. Sensation intact x 4EXT. A&Ox3 (oriented to person, place, and time).  PSYCHIATRIC: Appropriate affect. Vital Signs Last 24 Hrs  T(C): 36.3 (16 Nov 2020 18:43), Max: 36.3 (16 Nov 2020 18:43)  T(F): 97.4 (16 Nov 2020 18:43), Max: 97.4 (16 Nov 2020 18:43)  HR: 68 (16 Nov 2020 18:43) (68 - 68)  BP: 194/102 (16 Nov 2020 18:43) (194/102 - 194/102)  BP(mean): --  RR: 18 (16 Nov 2020 18:43) (18 - 18)  SpO2: 97% (16 Nov 2020 18:43) (97% - 97%)    CONSTITUTIONAL: No acute distress. Awake and alert.  HEAD: No evidence of trauma. Structures WNL.  EYES: +PERRL. +EOMI. No scleral icterus. No conjunctival injection.  ENT: Moist oral mucosa. No erythema. No pharyngeal exudates.   NECK: Supple. Appropriate ROM. No stiffness. No masses or lymphadenopathy.  RESPIRATORY: CTAB. No wheezes, rales, or rhonchi. No accessory muscle use. No apparent respiratory distress.  CARDIOVASCULAR: +S1/S2. No audible S3/S4. Regular rate and rhythm. No murmurs, rubs, or gallops. 2+ radial pulses x b/l UE; 2+ DP pulses x b/l LE.   GASTROINTESTINAL: Soft, nontender, nondistended. +BS. No rebound or guarding.   BACK: No spinal or paraspinal tenderness. No CVA tenderness.  EXTREMITY: +L leg circumference >R, this is b/l per pt. +L distal medial foot has area of erythema, tender to palpation. No LE swelling or edema. EXTs warm to touch.  MUSCULOSKELETAL: Spontaneous movement in all extremities.  DERMATOLOGICAL: +R anterior chest wall pacemaker site incision has surrounding erythema, minor brownish drainage noted. Covered in soaked steri strips. No area of fluctuance noted on exam, nontender to palpation. No abnormal rashes or lesions.  NEUROLOGICAL: CN 2-12 grossly intact. No focal deficits. Sensation intact x 4EXT. A&Ox3 (oriented to person, place, and time).  PSYCHIATRIC: Appropriate affect. Vital Signs Last 24 Hrs  T(C): 36.3 (16 Nov 2020 18:43), Max: 36.3 (16 Nov 2020 18:43)  T(F): 97.4 (16 Nov 2020 18:43), Max: 97.4 (16 Nov 2020 18:43)  HR: 68 (16 Nov 2020 18:43) (68 - 68)  BP: 194/102 (16 Nov 2020 18:43) (194/102 - 194/102)  BP(mean): --  RR: 18 (16 Nov 2020 18:43) (18 - 18)  SpO2: 97% (16 Nov 2020 18:43) (97% - 97%)    CONSTITUTIONAL: No acute distress. Awake and alert.  HEAD: No evidence of trauma. Structures WNL.  EYES: +PERRL. +EOMI. No scleral icterus. No conjunctival injection.  ENT: Moist oral mucosa. No erythema. No pharyngeal exudates.   NECK: Supple. Appropriate ROM. No stiffness. No masses or lymphadenopathy.  RESPIRATORY: CTAB. No wheezes, rales, or rhonchi. No accessory muscle use. No apparent respiratory distress.  CARDIOVASCULAR: +S1/S2. No audible S3/S4. Regular rate and rhythm. No murmurs, rubs, or gallops. 2+ radial pulses x b/l UE; 2+ DP pulses x b/l LE.   GASTROINTESTINAL: Soft, nontender, nondistended. +BS. No rebound or guarding.   BACK: No spinal or paraspinal tenderness. No CVA tenderness.  EXTREMITY: +L leg circumference >R, this is b/l per pt. +L distal medial 1st toe of foot has area of mild erythema, tender to palpation. No LE swelling or edema. EXTs warm to touch.  MUSCULOSKELETAL: Spontaneous movement in all extremities.  DERMATOLOGICAL: +R anterior chest wall pacemaker site incision has surrounding erythema, minor brownish drainage noted. Covered in soaked steri strips. No area of fluctuance noted on exam, nontender to palpation. No abnormal rashes or lesions.  NEUROLOGICAL: CN 2-12 grossly intact. No focal deficits. Sensation intact x 4EXT. A&Ox3 (oriented to person, place, and time).  PSYCHIATRIC: Appropriate affect.

## 2020-11-17 DIAGNOSIS — M79.672 PAIN IN LEFT FOOT: ICD-10-CM

## 2020-11-17 DIAGNOSIS — R00.1 BRADYCARDIA, UNSPECIFIED: ICD-10-CM

## 2020-11-17 PROBLEM — Z00.00 ENCOUNTER FOR PREVENTIVE HEALTH EXAMINATION: Status: ACTIVE | Noted: 2020-11-17

## 2020-11-17 LAB
A1C WITH ESTIMATED AVERAGE GLUCOSE RESULT: 5.8 % — HIGH (ref 4–5.6)
ALBUMIN SERPL ELPH-MCNC: 3.7 G/DL — SIGNIFICANT CHANGE UP (ref 3.3–5)
ALP SERPL-CCNC: 73 U/L — SIGNIFICANT CHANGE UP (ref 40–120)
ALT FLD-CCNC: 11 U/L — SIGNIFICANT CHANGE UP (ref 10–45)
ANION GAP SERPL CALC-SCNC: 12 MMOL/L — SIGNIFICANT CHANGE UP (ref 5–17)
ANION GAP SERPL CALC-SCNC: 17 MMOL/L — SIGNIFICANT CHANGE UP (ref 5–17)
AST SERPL-CCNC: 15 U/L — SIGNIFICANT CHANGE UP (ref 10–40)
BILIRUB SERPL-MCNC: 0.7 MG/DL — SIGNIFICANT CHANGE UP (ref 0.2–1.2)
BUN SERPL-MCNC: 17 MG/DL — SIGNIFICANT CHANGE UP (ref 7–23)
BUN SERPL-MCNC: 21 MG/DL — SIGNIFICANT CHANGE UP (ref 7–23)
CALCIUM SERPL-MCNC: 9.1 MG/DL — SIGNIFICANT CHANGE UP (ref 8.4–10.5)
CALCIUM SERPL-MCNC: 9.7 MG/DL — SIGNIFICANT CHANGE UP (ref 8.4–10.5)
CHLORIDE SERPL-SCNC: 101 MMOL/L — SIGNIFICANT CHANGE UP (ref 96–108)
CHLORIDE SERPL-SCNC: 105 MMOL/L — SIGNIFICANT CHANGE UP (ref 96–108)
CO2 SERPL-SCNC: 24 MMOL/L — SIGNIFICANT CHANGE UP (ref 22–31)
CO2 SERPL-SCNC: 25 MMOL/L — SIGNIFICANT CHANGE UP (ref 22–31)
CREAT SERPL-MCNC: 1.01 MG/DL — SIGNIFICANT CHANGE UP (ref 0.5–1.3)
CREAT SERPL-MCNC: 1.07 MG/DL — SIGNIFICANT CHANGE UP (ref 0.5–1.3)
ESTIMATED AVERAGE GLUCOSE: 120 MG/DL — HIGH (ref 68–114)
GLUCOSE SERPL-MCNC: 101 MG/DL — HIGH (ref 70–99)
GLUCOSE SERPL-MCNC: 111 MG/DL — HIGH (ref 70–99)
HCT VFR BLD CALC: 40.2 % — SIGNIFICANT CHANGE UP (ref 34.5–45)
HCT VFR BLD CALC: 42.9 % — SIGNIFICANT CHANGE UP (ref 34.5–45)
HCV AB S/CO SERPL IA: 0.06 S/CO — SIGNIFICANT CHANGE UP (ref 0–0.99)
HCV AB SERPL-IMP: SIGNIFICANT CHANGE UP
HGB BLD-MCNC: 13.3 G/DL — SIGNIFICANT CHANGE UP (ref 11.5–15.5)
HGB BLD-MCNC: 14 G/DL — SIGNIFICANT CHANGE UP (ref 11.5–15.5)
MAGNESIUM SERPL-MCNC: 1.8 MG/DL — SIGNIFICANT CHANGE UP (ref 1.6–2.6)
MAGNESIUM SERPL-MCNC: 1.9 MG/DL — SIGNIFICANT CHANGE UP (ref 1.6–2.6)
MCHC RBC-ENTMCNC: 30 PG — SIGNIFICANT CHANGE UP (ref 27–34)
MCHC RBC-ENTMCNC: 30.2 PG — SIGNIFICANT CHANGE UP (ref 27–34)
MCHC RBC-ENTMCNC: 32.6 GM/DL — SIGNIFICANT CHANGE UP (ref 32–36)
MCHC RBC-ENTMCNC: 33.1 GM/DL — SIGNIFICANT CHANGE UP (ref 32–36)
MCV RBC AUTO: 91.4 FL — SIGNIFICANT CHANGE UP (ref 80–100)
MCV RBC AUTO: 91.9 FL — SIGNIFICANT CHANGE UP (ref 80–100)
NRBC # BLD: 0 /100 WBCS — SIGNIFICANT CHANGE UP (ref 0–0)
NRBC # BLD: 0 /100 WBCS — SIGNIFICANT CHANGE UP (ref 0–0)
PHOSPHATE SERPL-MCNC: 3.9 MG/DL — SIGNIFICANT CHANGE UP (ref 2.5–4.5)
PHOSPHATE SERPL-MCNC: 4.4 MG/DL — SIGNIFICANT CHANGE UP (ref 2.5–4.5)
PLATELET # BLD AUTO: 302 K/UL — SIGNIFICANT CHANGE UP (ref 150–400)
PLATELET # BLD AUTO: 304 K/UL — SIGNIFICANT CHANGE UP (ref 150–400)
POTASSIUM SERPL-MCNC: 3.8 MMOL/L — SIGNIFICANT CHANGE UP (ref 3.5–5.3)
POTASSIUM SERPL-MCNC: 3.9 MMOL/L — SIGNIFICANT CHANGE UP (ref 3.5–5.3)
POTASSIUM SERPL-SCNC: 3.8 MMOL/L — SIGNIFICANT CHANGE UP (ref 3.5–5.3)
POTASSIUM SERPL-SCNC: 3.9 MMOL/L — SIGNIFICANT CHANGE UP (ref 3.5–5.3)
PROT SERPL-MCNC: 6.4 G/DL — SIGNIFICANT CHANGE UP (ref 6–8.3)
RBC # BLD: 4.4 M/UL — SIGNIFICANT CHANGE UP (ref 3.8–5.2)
RBC # BLD: 4.67 M/UL — SIGNIFICANT CHANGE UP (ref 3.8–5.2)
RBC # FLD: 12.8 % — SIGNIFICANT CHANGE UP (ref 10.3–14.5)
RBC # FLD: 12.9 % — SIGNIFICANT CHANGE UP (ref 10.3–14.5)
SODIUM SERPL-SCNC: 142 MMOL/L — SIGNIFICANT CHANGE UP (ref 135–145)
SODIUM SERPL-SCNC: 142 MMOL/L — SIGNIFICANT CHANGE UP (ref 135–145)
WBC # BLD: 9.28 K/UL — SIGNIFICANT CHANGE UP (ref 3.8–10.5)
WBC # BLD: 9.68 K/UL — SIGNIFICANT CHANGE UP (ref 3.8–10.5)
WBC # FLD AUTO: 9.28 K/UL — SIGNIFICANT CHANGE UP (ref 3.8–10.5)
WBC # FLD AUTO: 9.68 K/UL — SIGNIFICANT CHANGE UP (ref 3.8–10.5)

## 2020-11-17 PROCEDURE — 99233 SBSQ HOSP IP/OBS HIGH 50: CPT

## 2020-11-17 PROCEDURE — 33235 REMOVAL PACEMAKER ELECTRODE: CPT

## 2020-11-17 PROCEDURE — 33233 REMOVAL OF PM GENERATOR: CPT

## 2020-11-17 PROCEDURE — 71045 X-RAY EXAM CHEST 1 VIEW: CPT | Mod: 26

## 2020-11-17 PROCEDURE — 99232 SBSQ HOSP IP/OBS MODERATE 35: CPT | Mod: 25

## 2020-11-17 PROCEDURE — 99223 1ST HOSP IP/OBS HIGH 75: CPT | Mod: GC

## 2020-11-17 RX ORDER — LEVOTHYROXINE SODIUM 125 MCG
50 TABLET ORAL
Refills: 0 | Status: CANCELLED | OUTPATIENT
Start: 2020-11-18 | End: 2020-11-17

## 2020-11-17 RX ORDER — LEVOTHYROXINE SODIUM 125 MCG
50 TABLET ORAL
Refills: 0 | Status: DISCONTINUED | OUTPATIENT
Start: 2020-11-18 | End: 2020-11-25

## 2020-11-17 RX ORDER — METOPROLOL TARTRATE 50 MG
5 TABLET ORAL ONCE
Refills: 0 | Status: COMPLETED | OUTPATIENT
Start: 2020-11-17 | End: 2020-11-17

## 2020-11-17 RX ORDER — METOPROLOL TARTRATE 50 MG
25 TABLET ORAL ONCE
Refills: 0 | Status: COMPLETED | OUTPATIENT
Start: 2020-11-17 | End: 2020-11-17

## 2020-11-17 RX ORDER — ACETAMINOPHEN 500 MG
650 TABLET ORAL ONCE
Refills: 0 | Status: COMPLETED | OUTPATIENT
Start: 2020-11-17 | End: 2020-11-17

## 2020-11-17 RX ORDER — CARVEDILOL PHOSPHATE 80 MG/1
3.12 CAPSULE, EXTENDED RELEASE ORAL EVERY 12 HOURS
Refills: 0 | Status: DISCONTINUED | OUTPATIENT
Start: 2020-11-17 | End: 2020-11-17

## 2020-11-17 RX ORDER — BENZOCAINE AND MENTHOL 5; 1 G/100ML; G/100ML
1 LIQUID ORAL
Refills: 0 | Status: DISCONTINUED | OUTPATIENT
Start: 2020-11-17 | End: 2020-11-25

## 2020-11-17 RX ORDER — HYDRALAZINE HCL 50 MG
5 TABLET ORAL ONCE
Refills: 0 | Status: COMPLETED | OUTPATIENT
Start: 2020-11-17 | End: 2020-11-17

## 2020-11-17 RX ORDER — LEVOTHYROXINE SODIUM 125 MCG
50 TABLET ORAL DAILY
Refills: 0 | Status: DISCONTINUED | OUTPATIENT
Start: 2020-11-17 | End: 2020-11-17

## 2020-11-17 RX ORDER — CHLORHEXIDINE GLUCONATE 213 G/1000ML
1 SOLUTION TOPICAL
Refills: 0 | Status: DISCONTINUED | OUTPATIENT
Start: 2020-11-17 | End: 2020-11-17

## 2020-11-17 RX ORDER — COLCHICINE 0.6 MG
0.6 TABLET ORAL EVERY 24 HOURS
Refills: 0 | Status: DISCONTINUED | OUTPATIENT
Start: 2020-11-17 | End: 2020-11-17

## 2020-11-17 RX ORDER — CHLORHEXIDINE GLUCONATE 213 G/1000ML
1 SOLUTION TOPICAL AT BEDTIME
Refills: 0 | Status: DISCONTINUED | OUTPATIENT
Start: 2020-11-17 | End: 2020-11-25

## 2020-11-17 RX ORDER — OXYCODONE AND ACETAMINOPHEN 5; 325 MG/1; MG/1
1 TABLET ORAL EVERY 6 HOURS
Refills: 0 | Status: DISCONTINUED | OUTPATIENT
Start: 2020-11-17 | End: 2020-11-17

## 2020-11-17 RX ORDER — HYDROMORPHONE HYDROCHLORIDE 2 MG/ML
0.5 INJECTION INTRAMUSCULAR; INTRAVENOUS; SUBCUTANEOUS
Refills: 0 | Status: DISCONTINUED | OUTPATIENT
Start: 2020-11-17 | End: 2020-11-17

## 2020-11-17 RX ORDER — OXYCODONE HYDROCHLORIDE 5 MG/1
5 TABLET ORAL ONCE
Refills: 0 | Status: DISCONTINUED | OUTPATIENT
Start: 2020-11-17 | End: 2020-11-17

## 2020-11-17 RX ORDER — OXYCODONE AND ACETAMINOPHEN 5; 325 MG/1; MG/1
1 TABLET ORAL EVERY 4 HOURS
Refills: 0 | Status: DISCONTINUED | OUTPATIENT
Start: 2020-11-17 | End: 2020-11-17

## 2020-11-17 RX ORDER — FENTANYL CITRATE 50 UG/ML
25 INJECTION INTRAVENOUS
Refills: 0 | Status: DISCONTINUED | OUTPATIENT
Start: 2020-11-17 | End: 2020-11-17

## 2020-11-17 RX ORDER — VANCOMYCIN HCL 1 G
1000 VIAL (EA) INTRAVENOUS EVERY 12 HOURS
Refills: 0 | Status: DISCONTINUED | OUTPATIENT
Start: 2020-11-17 | End: 2020-11-20

## 2020-11-17 RX ORDER — COLCHICINE 0.6 MG
1.2 TABLET ORAL ONCE
Refills: 0 | Status: COMPLETED | OUTPATIENT
Start: 2020-11-17 | End: 2020-11-17

## 2020-11-17 RX ORDER — LEVOTHYROXINE SODIUM 125 MCG
75 TABLET ORAL
Refills: 0 | Status: DISCONTINUED | OUTPATIENT
Start: 2020-11-19 | End: 2020-11-25

## 2020-11-17 RX ORDER — HYDRALAZINE HCL 50 MG
25 TABLET ORAL THREE TIMES A DAY
Refills: 0 | Status: DISCONTINUED | OUTPATIENT
Start: 2020-11-17 | End: 2020-11-17

## 2020-11-17 RX ORDER — ACETAMINOPHEN 500 MG
1000 TABLET ORAL ONCE
Refills: 0 | Status: DISCONTINUED | OUTPATIENT
Start: 2020-11-17 | End: 2020-11-17

## 2020-11-17 RX ADMIN — PIPERACILLIN AND TAZOBACTAM 25 GRAM(S): 4; .5 INJECTION, POWDER, LYOPHILIZED, FOR SOLUTION INTRAVENOUS at 13:52

## 2020-11-17 RX ADMIN — OXYCODONE HYDROCHLORIDE 5 MILLIGRAM(S): 5 TABLET ORAL at 03:02

## 2020-11-17 RX ADMIN — Medication 1 MILLIGRAM(S): at 03:02

## 2020-11-17 RX ADMIN — LOSARTAN POTASSIUM 100 MILLIGRAM(S): 100 TABLET, FILM COATED ORAL at 05:08

## 2020-11-17 RX ADMIN — OXYCODONE HYDROCHLORIDE 5 MILLIGRAM(S): 5 TABLET ORAL at 04:51

## 2020-11-17 RX ADMIN — BENZOCAINE AND MENTHOL 1 LOZENGE: 5; 1 LIQUID ORAL at 23:37

## 2020-11-17 RX ADMIN — Medication 5 MILLIGRAM(S): at 23:37

## 2020-11-17 RX ADMIN — Medication 250 MILLIGRAM(S): at 12:08

## 2020-11-17 RX ADMIN — PIPERACILLIN AND TAZOBACTAM 25 GRAM(S): 4; .5 INJECTION, POWDER, LYOPHILIZED, FOR SOLUTION INTRAVENOUS at 07:58

## 2020-11-17 RX ADMIN — Medication 650 MILLIGRAM(S): at 02:22

## 2020-11-17 RX ADMIN — Medication 25 MILLIGRAM(S): at 21:50

## 2020-11-17 RX ADMIN — CARVEDILOL PHOSPHATE 3.12 MILLIGRAM(S): 80 CAPSULE, EXTENDED RELEASE ORAL at 01:32

## 2020-11-17 RX ADMIN — Medication 30 MILLIGRAM(S): at 05:09

## 2020-11-17 RX ADMIN — Medication 1.2 MILLIGRAM(S): at 13:09

## 2020-11-17 RX ADMIN — PIPERACILLIN AND TAZOBACTAM 200 GRAM(S): 4; .5 INJECTION, POWDER, LYOPHILIZED, FOR SOLUTION INTRAVENOUS at 00:41

## 2020-11-17 RX ADMIN — Medication 650 MILLIGRAM(S): at 01:32

## 2020-11-17 RX ADMIN — Medication 75 MICROGRAM(S): at 05:08

## 2020-11-17 RX ADMIN — Medication 5 MILLIGRAM(S): at 21:58

## 2020-11-17 RX ADMIN — Medication 0.25 MILLIGRAM(S): at 05:09

## 2020-11-17 RX ADMIN — Medication 250 MILLIGRAM(S): at 01:16

## 2020-11-17 NOTE — CONSULT NOTE ADULT - ASSESSMENT
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 She had a R sided dual chamber PPM placed 2.5 years ago by a Dr. Moore for sinus bradycardia to 29, otherwise asymptomatic. Pt has new pacemaker lead placed at [facility] on 11/5. The wound started to get infected with pus coming out after a week. She took 3 days of clindamycin it helped the infection but after stopping clindamycin it went back again. She then was called back to take levofloxacin. She developed left first toe podagra despite never had hx of gout. She does not have any systemic symptoms. Feeling well in general. She does not have leukocytosis or fever.    Per EP note, she has SSS, s/p ILR, s/p right sided PPM with capped Biotronik RA/RV leads from 2009, new RA lead 2014, generator change ([facility]) 2018 and RV lead revision 11/5/20 p/w pacemaker pocket infection.     #Pacemaker pocket infection:  - with pus and greenish drainage from the wound  - on vanc/zosyn  - EP plans to NPO her for PPM system extraction in the OR later today  - follow up BCx  - send for OR culture to decide abx selection    #Left first toe podagra: likely gout  - Care per primary team     75y F PMHx HTN, HLD, hypothyroid, afib (on eliquis), 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 She had a R sided dual chamber PPM placed 2.5 years ago by a Dr. Moore for sinus bradycardia to 29, otherwise asymptomatic. Pt has new pacemaker lead placed at [facility] on 11/5. The wound started to get infected with pus coming out after a week. She took 3 days of clindamycin it helped the infection but after stopping clindamycin it went back again. She then was called back to take levofloxacin. She developed left first toe podagra despite never had hx of gout. She does not have any systemic symptoms. Feeling well in general. She does not have leukocytosis or fever.    Per EP note, she has SSS, s/p ILR, s/p right sided PPM with capped [employer]ik RA/RV leads from 2009, new RA lead 2014, generator change ([facility]J) 2018 and RV lead revision 11/5/20 p/w pacemaker pocket infection.     #Pacemaker pocket infection:  - with pus and greenish drainage from the wound  - continue on vanc/zosyn  - EP plans to NPO her for PPM system extraction in the OR later today  - follow up BCx  - send for OR culture to decide abx selection    #Left first toe podagra: likely gout  - Care per primary team    Alexandra Oneill MD, PGY4   ID fellow  Pager: [phone]  After 5pm/weekends call [phone]    d/w Dr. Vera Carl conveyed to primary team

## 2020-11-17 NOTE — PROGRESS NOTE ADULT - SUBJECTIVE AND OBJECTIVE BOX
HOSPITALIST NOTE    Dr. Tamir Heck DO  Attending Physician  Division of Hospital Medicine  [facility]  Pager:  [phone]    SUBJECTIVE  Reports left 1st MTP pain with movement.   Some soreness noted at right chest ppm site.  No other complaints.      REVIEW OF SYSTEMS  12 point review of systems negative except for items noted above.      PAST MEDICAL & SURGICAL HISTORY:  Hypothyroid    Afib    HLD (hyperlipidemia)    HTN (hypertension)    History of appendectomy    H/O cardiac pacemaker        MEDICATIONS  (STANDING):  colchicine 0.6 milliGRAM(s) Oral every 24 hours  furosemide    Tablet 30 milliGRAM(s) Oral daily  levothyroxine 75 MICROGram(s) Oral <User Schedule>  losartan 100 milliGRAM(s) Oral daily  piperacillin/tazobactam IVPB.. 3.375 Gram(s) IV Intermittent every 8 hours  vancomycin  IVPB      vancomycin  IVPB 1000 milliGRAM(s) IV Intermittent every 12 hours    MEDICATIONS  (PRN):  oxycodone    5 mG/acetaminophen 325 mG 1 Tablet(s) Oral every 6 hours PRN Moderate Pain (4 - 6)  temazepam 30 milliGRAM(s) Oral at bedtime PRN Insomnia      Allergies    latex (Rash)  penicillin (Rash (Mild))    Intolerances        T(C): 36.6 (11-17-20 @ 04:45), Max: 36.9 (11-16-20 @ 23:20)  T(F): 97.9 (11-17-20 @ 04:45), Max: 98.5 (11-16-20 @ 23:20)  HR: 60 (11-17-20 @ 04:45) (59 - 68)  BP: 143/84 (11-17-20 @ 04:45) (138/64 - 194/102)  ABP: --  ABP(mean): --  RR: 18 (11-17-20 @ 04:45) (17 - 18)  SpO2: 97% (11-17-20 @ 04:45) (97% - 100%)      CONSTITUTIONAL: No acute distress.   HEENT:  Conjunctiva clear B/L. Nasal mucosa normal. Moist oral mucosa. No posterior pharyngeal lesions noted.  Cardiovascular: RRR with no murmurs. No JVD noted. No lower extremity edema B/L. Extremities are warm and well perfused. Radial pulses 2+ B/L. Dorsalis pedis pulses 2+ B/L.    Respiratory: Lungs CTAB. No accessory muscle use.   Gastrointestinal:  Soft, nontender. Non-distended. Non-rigid. No CVA tenderness B/L.  MSK:  No joint swelling. No joint erythema B/L. No midline spinal tenderness.  Neurologic:  Alert and awake. Oriented x3. Moving all extremities. Following commands. Making eye contact.    Skin:  Right chest wall PPM site with steristrip   Psych:  Normal affect. Normal Mood.     LABS                        13.3   9.28  )-----------( 304      ( 17 Nov 2020 06:16 )             40.2     11-17    142  |  105  |  21  ----------------------------<  101<H>  3.8   |  25  |  1.07    Ca    9.7      17 Nov 2020 06:16  Phos  4.4     11-17  Mg     1.9     11-17    TPro  6.5  /  Alb  3.9  /  TBili  0.2  /  DBili  x   /  AST  17  /  ALT  14  /  AlkPhos  78  11-16    PT/INR - ( 16 Nov 2020 19:14 )   PT: 14.5 sec;   INR: 1.22 ratio         PTT - ( 16 Nov 2020 19:14 )  PTT:33.1 sec      ADDITIONAL STUDIES PERSONALLY REVIEWED   HOSPITALIST NOTE    Dr. Tamir Heck DO  Attending Physician  Division of Hospital Medicine  [facility]  Pager:  [phone]    SUBJECTIVE  Reports left 1st MTP pain with movement.   Some soreness noted at right chest ppm site.  No other complaints.      REVIEW OF SYSTEMS  12 point review of systems negative except for items noted above.      PAST MEDICAL & SURGICAL HISTORY:  Hypothyroid    Afib    HLD (hyperlipidemia)    HTN (hypertension)    History of appendectomy    H/O cardiac pacemaker        MEDICATIONS  (STANDING):  colchicine 0.6 milliGRAM(s) Oral every 24 hours  furosemide    Tablet 30 milliGRAM(s) Oral daily  levothyroxine 75 MICROGram(s) Oral <User Schedule>  losartan 100 milliGRAM(s) Oral daily  piperacillin/tazobactam IVPB.. 3.375 Gram(s) IV Intermittent every 8 hours  vancomycin  IVPB      vancomycin  IVPB 1000 milliGRAM(s) IV Intermittent every 12 hours    MEDICATIONS  (PRN):  oxycodone    5 mG/acetaminophen 325 mG 1 Tablet(s) Oral every 6 hours PRN Moderate Pain (4 - 6)  temazepam 30 milliGRAM(s) Oral at bedtime PRN Insomnia      Allergies    latex (Rash)  penicillin (Rash (Mild))    Intolerances        T(C): 36.6 (11-17-20 @ 04:45), Max: 36.9 (11-16-20 @ 23:20)  T(F): 97.9 (11-17-20 @ 04:45), Max: 98.5 (11-16-20 @ 23:20)  HR: 60 (11-17-20 @ 04:45) (59 - 68)  BP: 143/84 (11-17-20 @ 04:45) (138/64 - 194/102)  ABP: --  ABP(mean): --  RR: 18 (11-17-20 @ 04:45) (17 - 18)  SpO2: 97% (11-17-20 @ 04:45) (97% - 100%)      CONSTITUTIONAL: No acute distress.   HEENT:  Conjunctiva clear B/L. Moist oral mucosa.   Cardiovascular: RRR with no murmurs. No JVD noted. No lower extremity edema B/L. Extremities are warm and well perfused. Radial pulses 2+ B/L. Dorsalis pedis pulses 2+ B/L.    Respiratory: Lungs CTAB. No accessory muscle use.   Gastrointestinal:  Soft, nontender. Non-distended. Non-rigid. No CVA tenderness B/L.  MSK:  left 1st mtp with erythema and tenderness with palpation and movement.   Neurologic:  Alert and awake. Oriented x3. Moving all extremities. Following commands. Making eye contact.    Skin:  Right chest wall PPM site with steri-strips on. Serosanguinous drainage / mild erythema / no clear fluctuance.   Psych:  Normal affect. Normal Mood.     LABS                        13.3   9.28  )-----------( 304      ( 17 Nov 2020 06:16 )             40.2     11-17    142  |  105  |  21  ----------------------------<  101<H>  3.8   |  25  |  1.07    Ca    9.7      17 Nov 2020 06:16  Phos  4.4     11-17  Mg     1.9     11-17    TPro  6.5  /  Alb  3.9  /  TBili  0.2  /  DBili  x   /  AST  17  /  ALT  14  /  AlkPhos  78  11-16    PT/INR - ( 16 Nov 2020 19:14 )   PT: 14.5 sec;   INR: 1.22 ratio         PTT - ( 16 Nov 2020 19:14 )  PTT:33.1 sec      ADDITIONAL STUDIES PERSONALLY REVIEWED

## 2020-11-17 NOTE — PROGRESS NOTE ADULT - PROBLEM SELECTOR PLAN 6
- pt takes livalo (pitavastatin) as she is intolerant to other statins- advised pt to have her daughter bring in her home dose to be verified with pharmacy and then administered  - otherwise restart as outpt

## 2020-11-17 NOTE — PROGRESS NOTE ADULT - ASSESSMENT
75 year old female with PMHx HTN, HLD, hypothyroid, Afib (on xarelto), SSS, s/p ILR, s/p PPM with capped Bi[employer]ronik RA/RV leads from 2009, new RA lead 2014, generator change ([facility]) 2018 and RV lead revision 11/5/20 p/w pacemaker pocket infection.     1. PPM pocket infection  2. SSS s/p PPM  3. PAF w/ controlled ventricular rate    --PPM interrogation ([facility] [[email or web address] revealed since 11/5/2020: A pace 78%, V pace 84%, total AT/AF burden 7.9% and PAF w/ controlled ventricular rates, not PPM dependent with underlying rhythm SR in the 50's.   --Hold all AV ana maria agents: coreg and Digoxin d/c'd this am and off cardizem 300mg since 11/13/20  --Hold AC (off xarelto since 11/13/20)  --f/u blood culture (result pending)  --NPO for PPM system extraction in the OR later today    ELVA Morales, SITA  [phone]   75 year old female with PMHx HTN, HLD, hypothyroid, Afib (on xarelto), SSS, s/p ILR, s/p right sided PPM with capped Biotronik RA/RV leads from 2009, new RA lead 2014, generator change ([facility]J) 2018 and RV lead revision 11/5/20 p/w pacemaker pocket infection.     1. PPM pocket infection  2. SSS s/p PPM  3. PAF w/ controlled ventricular rate  4. Left foot gout (possible)    --PPM interrogation ([facility]) revealed since 11/5/2020: A pace 78%, V pace 84%, total AT/AF burden 7.9% and PAF w/ controlled ventricular rates, not PPM dependent with underlying rhythm SR in the 50's.   --Hold all AV ana maria agents: coreg and Digoxin d/c'd this am and off cardizem 300mg since 11/13/20  --Hold AC (off xarelto since 11/13/20)  --f/u blood culture (result pending)  --Rheum consult for possible left foot gout   --NPO for PPM system extraction in the OR later today    ELVA Morales, SITA  [phone]

## 2020-11-17 NOTE — PROCEDURE NOTE - ADDITIONAL PROCEDURE DETAILS
1) Indication for interrogation: PPM pocket infection   2) Presenting rhythm: A pace/V sense with intermittent AV pacing at 60's   3) Measured data WNL, NL PM function, Pt is not PM dependent  4) Since 11/5/2020: A pace 78%, V pace 84%, total AT/AF burden 7.9%  5) Stored data revealed 22 atrial high rate episodes between 11/16/20-11/17/20 lasting 12 seconds to 2 mins and 4 sec, review of available EGMs consistent with PAF with controlled ventricular rates.   6) Changes made: none  7) Plan is for PPM system extraction in the OR later today    Device information:  Generator: [employer] PM [id number], implanted 6/4/2018  RA lead (active): Biotronik [id number], implanted 9/4/2014  RV lead (active): [employer] [id number], implanted 11/5/2020  RA lead (capped): [employer]ik [id number], implanted 12/4/2009  RV lead (capped): [employer]ik [id number], implanted 12/4/2009    ELVA Morales, NP  [phone]

## 2020-11-17 NOTE — PROGRESS NOTE ADULT - PROBLEM SELECTOR PLAN 4
- pt held eliquis and dilt doses (last taken 11/13) in anticipation of upcoming procedure  - holding AC for cardiac procedure per EP recs  - c/w digoxin, coreg  - resume eliquis and dilt once cleared by cards Doac, coreg, dig, and dilt all held for planned procedure. Recommendations by EP appreciated.

## 2020-11-17 NOTE — PROVIDER CONTACT NOTE (OTHER) - REASON
Patient baseline SR/ HR 60's with 3 episodes of converting to AF lasting 5 min ('s), 3 min than 2 min. Patient self converted to SR vpaced now.

## 2020-11-17 NOTE — CONSULT NOTE ADULT - SUBJECTIVE AND OBJECTIVE BOX
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 She had a R sided dual chamber PPM placed 2.5 years ago by a Dr. Moore for sinus bradycardia to 29, otherwise asymptomatic. She was without complications until 10/30 when her cardiology office [facility] received a transmission from her PPM that showed "something wrong". She was advised to get the PPM evaluated and saw a Dr. Kent who noted one of the wires was loose. On, he elected to put in a new wire and leave the loose wire in. However, a few days after that procedure the pt started noticing purulent discharge from the pacemaker incision site and presented to [facility]. She was prescribed a 3 day course of clindamycin but the purulent drainage did not resolved. She then called went back to [place] and has since completed 5 out of 20 day course of levofloxacin. On  she noticed acute sharp L 1st toe foot pain. denies trauma, bleeding, or discharge. Pt reports no hx of gout or pseudogout. She called Dr. Moore's office who recommended she come to [facility] for PPM removal and further evaluation. Pt still reports drainage from the ppm incision site.    Of note, pt was advised to stop taking her eliquis and diltiazem on  as she may get a procedure so she has not taken these medications since  pm.     ED course:   afebrile, HR 68, /102, RR 18, 97% on RA  Received CXR (2020 23:02)    Pt has new pacemaker lead placed at [facility] on . The wound started to get infected with pus coming out after a week. She took 3 days of clindamycin it helped the infection but after stopping clindamycin it went back again. She then was called back to take levofloxacin.    She developed left first toe podagra despite never had hx of gout.    She does not have any systemic symptoms. Feeling well in general.       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 Has 5 cats at home, but she does not usually go downstairs, it's her children who play with the cats. She denies any cat scratch or bite.    FAMILY HISTORY:  - Father  of MI at 46 yo ([place])  - Mother  at 92 yo relatively healthy    Allergies  latex (Rash)  Pt stated that she is allergic to PCN when 3 yo she had rash, however, she tolerated Zosyn in our hospital    ANTIMICROBIALS:  piperacillin/tazobactam IVPB.. 3.375 every 8 hours  vancomycin  IVPB    vancomycin  IVPB 1000 every 12 hours      ANTIMICROBIALS (past 90 days):  MEDICATIONS  (STANDING):  piperacillin/tazobactam IVPB.   200 mL/Hr IV Intermittent (20 @ 00:41)    piperacillin/tazobactam IVPB..   25 mL/Hr IV Intermittent (20 @ 07:58)    vancomycin  IVPB   250 mL/Hr IV Intermittent (20 @ 01:16)    OTHER MEDS:   MEDICATIONS  (STANDING):  furosemide    Tablet 30 daily  levothyroxine 75 <User Schedule>  losartan 100 daily  oxycodone    5 mG/acetaminophen 325 mG 1 every 6 hours PRN  temazepam 30 at bedtime PRN      REVIEW OF SYSTEMS  [  ] ROS unobtainable because:    [V  ] All other systems negative except as noted below: right upper chest pacemaker site wound with pus and drainage, left foot first toe podagra    Constitutional:  [ ] fever [ ] chills  [ ] weight loss  [ ] weakness  Skin:  [ ] rash [ ] phlebitis	  Eyes: [ ] icterus [ ] pain  [ ] discharge	  ENMT: [ ] sore throat  [ ] thrush [ ] ulcers [ ] exudates  Respiratory: [ ] dyspnea [ ] hemoptysis [ ] cough [ ] sputum	  Cardiovascular:  [ ] chest pain [ ] palpitations [ ] edema	  Gastrointestinal:  [ ] nausea [ ] vomiting [ ] diarrhea [ ] constipation [ ] pain	  Genitourinary:  [ ] dysuria [ ] frequency [ ] hematuria [ ] discharge [ ] flank pain  [ ] incontinence  Musculoskeletal:  [ ] myalgias [V ] arthralgias [ ] arthritis  [ ] back pain  Neurological:  [ ] headache [ ] seizures  [ ] confusion/altered mental status  Psychiatric:  [ ] anxiety [ ] depression	  Hematology/Lymphatics:  [ ] lymphadenopathy  Endocrine:  [ ] adrenal [ ] thyroid  Allergic/Immunologic:	 [ ] transplant [ ] seasonal    VITALS:  Vital Signs Last 24 Hrs  T(F): 97.9 (20 @ 04:45), Max: 98.5 (20 @ 23:20)    Vital Signs Last 24 Hrs  HR: 60 (20 @ 04:45) (59 - 68)  BP: 143/84 (20 @ 04:45) (138/64 - 194/102)  RR: 18 (20 @ 04:45)  SpO2: 97% (20 @ 04:45) (97% - 100%)  Wt(kg): --    EXAM:  GA: NAD  HEENT: oral cavity no lesion  CV: nl S1/S2, no RMG  Lungs: CTAB  Abd: BS+, soft, nontender, no rebounding pain  Ext: no edema  Skin: no rash  IV: no phlebitis  right upper chest pacemaker site wound with pus and drainage, underneath the skin is slightly warm but is erythematous  left foot first toe podagra+ tender to touch      Labs:                        13.3   9.28  )-----------( 304      ( 2020 06:16 )             40.2         142  |  105  |  21  ----------------------------<  101<H>  3.8   |  25  |  1.07    Ca    9.7      2020 06:16  Phos  4.4       Mg     1.9         TPro  6.5  /  Alb  3.9  /  TBili  0.2  /  DBili  x   /  AST  17  /  ALT  14  /  AlkPhos  78        WBC Trend:  WBC Count: 9.28 (20 @ 06:16)  WBC Count: 9.81 (20 @ 19:14)      Auto Neutrophil #: 6.56 K/uL (20 @ 19:14)      Creatine Trend:  Creatinine, Serum: 1.07 ()  Creatinine, Serum: 1.17 ()      Liver Biochemical Testing Trend:  Alanine Aminotransferase (ALT/SGPT): 14 ()  Aspartate Aminotransferase (AST/SGOT): 17 (20 @ 19:14)  Bilirubin Total, Serum: 0.2 ()      MICROBIOLOGY:  BCx: received at  3:30am    OTHER TESTS:  COVID-19 PCR: NotDetec (20 @ 19:14)  A1C with Estimated Average Glucose Result: 5.8 % (20 @ 10:58)      RADIOLOGY:  imaging below personally reviewed    < from: Xray Chest 1 View AP/PA (20 @ 20:37) >  Impression:    The heart is enlarged. The lungs appear to be clear. No pleural effusion. No pneumothorax. A pacer is seen on the right and the tip of the electrodes are in the right atrium and right ventricular. A left atrial appendage clip is present.    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 She had a R sided dual chamber PPM placed 2.5 years ago by a Dr. Moore for sinus bradycardia to 29, otherwise asymptomatic. She was without complications until 10/30 when her cardiology office [facility] received a transmission from her PPM that showed "something wrong". She was advised to get the PPM evaluated and saw a Dr. Kent who noted one of the wires was loose. On, he elected to put in a new wire and leave the loose wire in. However, a few days after that procedure the pt started noticing purulent discharge from the pacemaker incision site and presented to [facility]. She was prescribed a 3 day course of clindamycin but the purulent drainage did not resolved. She then called went back to [place] and has since completed 5 out of 20 day course of levofloxacin. On  she noticed acute sharp L 1st toe foot pain. denies trauma, bleeding, or discharge. Pt reports no hx of gout or pseudogout. She called Dr. Moore's office who recommended she come to [facility] for PPM removal and further evaluation. Pt still reports drainage from the ppm incision site.    Of note, pt was advised to stop taking her eliquis and diltiazem on  as she may get a procedure so she has not taken these medications since  pm.     ED course:   afebrile, HR 68, /102, RR 18, 97% on RA  Received CXR (2020 23:02)    Pt has new pacemaker lead placed at [facility] on . The wound started to get infected with pus coming out after a week. She took 3 days of clindamycin it helped the infection but after stopping clindamycin it went back again. She then was called back to take levofloxacin.    She developed left first toe podagra despite never had hx of gout.    She does not have any systemic symptoms. Feeling well in general.     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 Has 5 cats at home, but she does not usually go downstairs, it's her children who play with the cats. She denies any cat scratch or bite.    FAMILY HISTORY:  - Father  of MI at 44 yo ([place]  - Mother  at 94 yo relatively healthy    Allergies  latex (Rash)  Pt stated that she is allergic to PCN when 5 yo she had rash, however, she tolerated Zosyn in our hospital    ANTIMICROBIALS:  piperacillin/tazobactam IVPB.. 3.375 every 8 hours  vancomycin  IVPB    vancomycin  IVPB 1000 every 12 hours    ANTIMICROBIALS (past 90 days):  MEDICATIONS  (STANDING):  piperacillin/tazobactam IVPB.   200 mL/Hr IV Intermittent (20 @ 00:41)    piperacillin/tazobactam IVPB..   25 mL/Hr IV Intermittent (20 @ 07:58)    vancomycin  IVPB   250 mL/Hr IV Intermittent (20 @ 01:16)    OTHER MEDS:   MEDICATIONS  (STANDING):  furosemide    Tablet 30 daily  levothyroxine 75 <User Schedule>  losartan 100 daily  oxycodone    5 mG/acetaminophen 325 mG 1 every 6 hours PRN  temazepam 30 at bedtime PRN      REVIEW OF SYSTEMS  [  ] ROS unobtainable because:    [V  ] All other systems negative except as noted below: right upper chest pacemaker site wound with pus and drainage, left foot first toe podagra    Constitutional:  [ ] fever [ ] chills  [ ] weight loss  [ ] weakness  Skin:  [ ] rash [ ] phlebitis	  Eyes: [ ] icterus [ ] pain  [ ] discharge	  ENMT: [ ] sore throat  [ ] thrush [ ] ulcers [ ] exudates  Respiratory: [ ] dyspnea [ ] hemoptysis [ ] cough [ ] sputum	  Cardiovascular:  [ ] chest pain [ ] palpitations [ ] edema	  Gastrointestinal:  [ ] nausea [ ] vomiting [ ] diarrhea [ ] constipation [ ] pain	  Genitourinary:  [ ] dysuria [ ] frequency [ ] hematuria [ ] discharge [ ] flank pain  [ ] incontinence  Musculoskeletal:  [ ] myalgias [V ] arthralgias [ ] arthritis  [ ] back pain  Neurological:  [ ] headache [ ] seizures  [ ] confusion/altered mental status  Psychiatric:  [ ] anxiety [ ] depression	  Hematology/Lymphatics:  [ ] lymphadenopathy  Endocrine:  [ ] adrenal [ ] thyroid  Allergic/Immunologic:	 [ ] transplant [ ] seasonal    VITALS:  Vital Signs Last 24 Hrs  T(F): 97.9 (20 @ 04:45), Max: 98.5 (20 @ 23:20)    Vital Signs Last 24 Hrs  HR: 60 (20 @ 04:45) (59 - 68)  BP: 143/84 (20 @ 04:45) (138/64 - 194/102)  RR: 18 (20 @ 04:45)  SpO2: 97% (20 @ 04:45) (97% - 100%)  Wt(kg): --    EXAM:  GA: NAD  HEENT: oral cavity no lesion  CV: nl S1/S2, no RMG  Lungs: CTAB  Abd: BS+, soft, nontender, no rebounding pain  Ext: no edema  Skin: no rash  IV: no phlebitis  right upper chest pacemaker site wound with pus and drainage, underneath the skin is slightly warm but is erythematous  left foot first toe podagra+ tender to touch      Labs:                        13.3   9.28  )-----------( 304      ( 2020 06:16 )             40.2         142  |  105  |  21  ----------------------------<  101<H>  3.8   |  25  |  1.07    Ca    9.7      2020 06:16  Phos  4.4       Mg     1.9         TPro  6.5  /  Alb  3.9  /  TBili  0.2  /  DBili  x   /  AST  17  /  ALT  14  /  AlkPhos  78        WBC Trend:  WBC Count: 9.28 (20 @ 06:16)  WBC Count: 9.81 (20 @ 19:14)      Auto Neutrophil #: 6.56 K/uL (20 @ 19:14)      Creatine Trend:  Creatinine, Serum: 1.07 ()  Creatinine, Serum: 1.17 ()      Liver Biochemical Testing Trend:  Alanine Aminotransferase (ALT/SGPT): 14 ()  Aspartate Aminotransferase (AST/SGOT): 17 (20 @ 19:14)  Bilirubin Total, Serum: 0.2 ()      MICROBIOLOGY:  BCx: received at  3:30am    OTHER TESTS:  COVID-19 PCR: NotDetec (20 @ 19:14)  A1C with Estimated Average Glucose Result: 5.8 % (20 @ 10:58)      RADIOLOGY:  imaging below personally reviewed    < from: Xray Chest 1 View AP/PA (20 @ 20:37) >  Impression:    The heart is enlarged. The lungs appear to be clear. No pleural effusion. No pneumothorax. A pacer is seen on the right and the tip of the electrodes are in the right atrium and right ventricular. A left atrial appendage clip is present.    < end of copied text >

## 2020-11-17 NOTE — PROGRESS NOTE ADULT - PROBLEM SELECTOR PLAN 1
likely PPM site infection given recent instrumentation, insertion of new wire. Incision site is erythematous with reportedly purulent drainage but pt not meeting sepsis criteria. s/p clindamycin 3d, levofloxacin 5d. Now awaiting revision per EP.   - EP following, plan for PPM removal/exchange 11/17  - f/u blood cx  - start vanc/zosyn (11/16- )  - ?f/u EP recs for need for NEVILLE to r/o endocarditis  - trend WBC, fevers - EP following, plan for PPM removal/exchange 11/17  - f/u blood cx  - resume vanc/zosyn (11/16- )  - monitor   - ?f/u EP recs for need for NEVILLE to r/o endocarditis  - trend WBC, fevers - EP following, plan for PPM removal/exchange 11/17  - f/u blood cx  - resume vanc/zosyn (11/16- )  - monitor vanco level and RFTs.   - F/u OR cx data.  - ID on board.

## 2020-11-17 NOTE — CHART NOTE - NSCHARTNOTEFT_GEN_A_CORE
CCU Accept Note    Transfer from: (  ) Medicine    ( x ) Telemetry    (  ) RCU                                        (  ) Palliative     (  ) Stroke Unit    ( ) _______________    Accepting Physician: Dr. 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 She had a R sided dual chamber PPM placed 2.5 years ago by a Dr. Moore for sinus bradycardia to 29, otherwise asymptomatic. She was without complications until 10/30 when her cardiology office [place] received a transmission from her PPM that showed "something wrong". She was advised to get the PPM evaluated and saw a Dr. Kent who noted one of the wires was loose. On11/5, he elected to put in a new wire and leave the loose wire in. However, a few days after that procedure the pt started noticing purulent discharge from the pacemaker incision site and presented to [facility]. She was prescribed a 3 day course of clindamycin but the purulent drainage did not resolved. She then called went back to [place] and has since completed 5 out of 20 day course of levofloxacin. On 11/14 she noticed acute sharp L 1st toe foot pain. denies trauma, bleeding, or discharge. Pt reports no hx of gout or pseudogout. She called Dr. Moore's office who recommended she come to [facility] for PPM removal and further evaluation. Pt still reports drainage from the ppm incision site. Of note, pt was advised to stop taking her eliquis and diltiazem on 11/13 as she may get a procedure so she has not taken these medications since 11/13 pm.    Patient was started on vancomycin and zosyn, narrowed to vancomycin only and planned for removal on 11/17. She was treated for acute gout flare in her L right great toe. The pacemaker pocket was opened, all leads freed and anchors removed. She developed atrial fibrillation and underwent DC CV. In the PACU, she had HR to 180 and BP to 220s. She was given lopressor 5 mg IV and metoprolol tartrate 25 mg once. She was transferred to CICU for monitoring and treatment of aflutter. Presently she complains of chest pain at the site of her pocket wound and a sore throat following extubation. Otherwise, she has no acute complaints. Her right groin is without pain. She has left toe pain.    REVIEW OF SYSTEMS:   No fevers, chest pain, shortness of breath, abdominal pain, lower extremity swelling or pain, dysuria, or constipation.    MEDICATIONS  (STANDING):  chlorhexidine 2% Cloths 1 Application(s) Topical at bedtime  hydrALAZINE Injectable 5 milliGRAM(s) IV Push once  vancomycin  IVPB 1000 milliGRAM(s) IV Intermittent every 12 hours    MEDICATIONS  (PRN):  benzocaine 15 mG/menthol 3.6 mG (Sugar-Free) Lozenge 1 Lozenge Oral four times a day PRN Sore Throat      Allergies    latex (Rash)  penicillin (Rash (Mild))    Intolerances        Vital Signs Last 24 Hrs  T(C): 37.2 (17 Nov 2020 22:45), Max: 37.2 (17 Nov 2020 22:45)  T(F): 99 (17 Nov 2020 22:45), Max: 99 (17 Nov 2020 22:45)  HR: 120 (17 Nov 2020 23:00) (59 - 132)  BP: 176/105 (17 Nov 2020 22:45) (115/73 - 190/107)  BP(mean): 144 (17 Nov 2020 22:45) (124 - 145)  RR: 17 (17 Nov 2020 23:00) (16 - 20)  SpO2: 95% (17 Nov 2020 23:00) (95% - 100%)    I&O's Summary      Physical Exam:  General: Well-appearing, NAD  HEENT: PERRL, EOMI, normal sclera and conjunctiva, normal oropharynx  Neck: Supple, no JVD, thyroid without masses or enlargement  Chest/Lungs: CTA bilaterally, no wheezing, rales, rhonchi or rub. Right chest wound with wound vac intact, swollen. Tender to palpation.  Heart: Irregularly irregular. No murmurs.  Abdomen: Soft, ND, NTTP, normoactive bowel sounds  Extremities: Left great toe erythematous, tender to light touch. R groin site clean dry and intact.  Skin: Warm, well-perfused, no rashes or lesions  Neurological: A&Ox3, moves all extremities, no focal deficits    LABS:                         14.0   9.68  )-----------( 302      ( 17 Nov 2020 23:22 )             42.9     11-17    142  |  105  |  21  ----------------------------<  101<H>  3.8   |  25  |  1.07    Ca    9.7      17 Nov 2020 06:16  Phos  4.4     11-17  Mg     1.9     11-17    TPro  6.5  /  Alb  3.9  /  TBili  0.2  /  DBili  x   /  AST  17  /  ALT  14  /  AlkPhos  78  11-16    LIVER FUNCTIONS - ( 16 Nov 2020 19:14 )  Alb: 3.9 g/dL / Pro: 6.5 g/dL / ALK PHOS: 78 U/L / ALT: 14 U/L / AST: 17 U/L / GGT: x           PT/INR - ( 16 Nov 2020 19:14 )   PT: 14.5 sec;   INR: 1.22 ratio         PTT - ( 16 Nov 2020 19:14 )  PTT:33.1 sec          ECG: Atrial flutter without ischemic changes    Telemetry (24 Hrs): Atrial flutter 110-130s, fluctuant.    Echocardiogram:    IMAGING:    ASSESSMENT & PLAN: CCU Accept Note    Transfer from: (  ) Medicine    ( x ) Telemetry    (  ) RCU                                        (  ) Palliative     (  ) Stroke Unit    ( ) _______________    Accepting Physician: Dr. 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 She had a R sided dual chamber PPM placed 2.5 years ago by a Dr. Moore for sinus bradycardia to 29, otherwise asymptomatic. She was without complications until 10/30 when her cardiology office [place] received a transmission from her PPM that showed "something wrong". She was advised to get the PPM evaluated and saw a Dr. Kent who noted one of the wires was loose. On11/5, he elected to put in a new wire and leave the loose wire in. However, a few days after that procedure the pt started noticing purulent discharge from the pacemaker incision site and presented to [facility]. She was prescribed a 3 day course of clindamycin but the purulent drainage did not resolved. She then called went back to [place] and has since completed 5 out of 20 day course of levofloxacin. On 11/14 she noticed acute sharp L 1st toe foot pain. denies trauma, bleeding, or discharge. Pt reports no hx of gout or pseudogout. She called Dr. Moore's office who recommended she come to [facility] for PPM removal and further evaluation. Pt still reports drainage from the ppm incision site. Of note, pt was advised to stop taking her eliquis and diltiazem on 11/13 as she may get a procedure so she has not taken these medications since 11/13 pm.    Patient was started on vancomycin and zosyn, narrowed to vancomycin only and planned for removal on 11/17. She was treated for acute gout flare in her L right great toe. The pacemaker pocket was opened, all leads freed and anchors removed. She developed atrial fibrillation and underwent DC CV. In the PACU, she had HR to 180 and BP to 220s. She was given lopressor 5 mg IV and metoprolol tartrate 25 mg once. She was transferred to CICU for monitoring and treatment of aflutter. Presently she complains of chest pain at the site of her pocket wound and a sore throat following extubation. Otherwise, she has no acute complaints. Her right groin is without pain. She has left toe pain.    REVIEW OF SYSTEMS:   No fevers, chest pain, shortness of breath, abdominal pain, lower extremity swelling or pain, dysuria, or constipation.    MEDICATIONS  (STANDING):  chlorhexidine 2% Cloths 1 Application(s) Topical at bedtime  hydrALAZINE Injectable 5 milliGRAM(s) IV Push once  vancomycin  IVPB 1000 milliGRAM(s) IV Intermittent every 12 hours    MEDICATIONS  (PRN):  benzocaine 15 mG/menthol 3.6 mG (Sugar-Free) Lozenge 1 Lozenge Oral four times a day PRN Sore Throat      Allergies    latex (Rash)  penicillin (Rash (Mild))    Intolerances        Vital Signs Last 24 Hrs  T(C): 37.2 (17 Nov 2020 22:45), Max: 37.2 (17 Nov 2020 22:45)  T(F): 99 (17 Nov 2020 22:45), Max: 99 (17 Nov 2020 22:45)  HR: 120 (17 Nov 2020 23:00) (59 - 132)  BP: 176/105 (17 Nov 2020 22:45) (115/73 - 190/107)  BP(mean): 144 (17 Nov 2020 22:45) (124 - 145)  RR: 17 (17 Nov 2020 23:00) (16 - 20)  SpO2: 95% (17 Nov 2020 23:00) (95% - 100%)    I&O's Summary      Physical Exam:  General: Well-appearing, NAD  HEENT: PERRL, EOMI, normal sclera and conjunctiva, normal oropharynx  Neck: Supple, no JVD, thyroid without masses or enlargement  Chest/Lungs: CTA bilaterally, no wheezing, rales, rhonchi or rub. Right chest wound with wound vac intact, swollen. Tender to palpation.  Heart: Irregularly irregular. No murmurs.  Abdomen: Soft, ND, NTTP, normoactive bowel sounds  Extremities: Left great toe erythematous, tender to light touch. R groin site clean dry and intact.  Skin: Warm, well-perfused, no rashes or lesions  Neurological: A&Ox3, moves all extremities, no focal deficits    LABS:                         14.0   9.68  )-----------( 302      ( 17 Nov 2020 23:22 )             42.9     11-17    142  |  105  |  21  ----------------------------<  101<H>  3.8   |  25  |  1.07    Ca    9.7      17 Nov 2020 06:16  Phos  4.4     11-17  Mg     1.9     11-17    TPro  6.5  /  Alb  3.9  /  TBili  0.2  /  DBili  x   /  AST  17  /  ALT  14  /  AlkPhos  78  11-16    LIVER FUNCTIONS - ( 16 Nov 2020 19:14 )  Alb: 3.9 g/dL / Pro: 6.5 g/dL / ALK PHOS: 78 U/L / ALT: 14 U/L / AST: 17 U/L / GGT: x           PT/INR - ( 16 Nov 2020 19:14 )   PT: 14.5 sec;   INR: 1.22 ratio         PTT - ( 16 Nov 2020 19:14 )  PTT:33.1 sec          ECG: Atrial flutter without ischemic changes    Telemetry (24 Hrs): Atrial flutter 110-130s, fluctuant.    Echocardiogram: No echo    IMAGING: The heart is enlarged. The lungs appear to be clear. No pleural effusion. No pneumothorax. A pacer is seen on the right and the tip of the electrodes are in the right atrium and right ventricular. A left atrial appendage clip is present.    ASSESSMENT & PLAN:    ====================ASSESSMENT AND PLAN==============  75y F PMHx HTN, HLD, hypothyroid, afib (on eliquis), sinus bradycardia s/p PPM p/w pacemaker site and gout s/p pacemaker removal 11/17 c/b aflutter and hypertension  ====================CARDIOVASCULAR==================    Mechaincal Circulatory Support:  [ ] IABP   [ ] Impella 2.5   [ ] Impella CP  Settings:     ==Hemodynamics==     (Date) CVP:      PCWP:        PA S/D:            Cardiac Output:             Cardiac Index:            SVR:    Preload (fluids, diuretics):  Afterload (anti-hypertensives, pressors):  Inotropes:    ==Pump==    Echo Date:  LVEF:                              Regional Wall Motion Abnormaility?:  [ ]Yes   [ ] No, If Yes, Details  Diastolic function:  RV function:   Any change frim prior?: [ ] Yes   [ ] No, If Yes, Details:   Volume status:    ==Coronaries==    Last ischemic workup:   Antiplatelet regimen:   Anticoagulant:   Statin:   Beta blocker:    ==Rhythm==    Current rhythm:  AM EKG Interpretation:   Anti-arrhythmic therapies:   TVP with settings:         ====================== NEUROLOGY=====================  Sedation [ ]Yes   [ ] No  Delirium [ ]Yes   [ ] No      ==================== RESPIRATORY======================  Mechanical Ventilation [ ]    BIPAP [ ]   HFNC [ ]   NC [ ]                 ===================== RENAL =========================    Renal Replacement Therapy:  [ ] CRRT      [ ] IHD, Last Session:    Fluid removal:     [ ] Diuretic therapy, Regimen:       ==================== GASTROINTESTINAL===================    Diet:  Last BM:   Indication for Stress Ulcer Prophylaxis, [ ] Yes    [ ] No   If Yes, Medication:       ========================INFECTIOUS DISEASE================  T(C): 37.2 (11-17-20 @ 22:45), Max: 37.2 (11-17-20 @ 22:45)  WBC Count: 9.68 K/uL (11-17-20 @ 23:22)  WBC Count: 9.28 K/uL (11-17-20 @ 06:16)  WBC Count: 9.81 K/uL (11-16-20 @ 19:14)        Current Antibiotics/Antifungals with start date:     vancomycin  IVPB 1000 milliGRAM(s) IV Intermittent every 12 hours    ===================HEMATOLOGIC/ONC ===================  Hemoglobin: 14.0 g/dL (11-17-20 @ 23:22)  Hemoglobin: 13.3 g/dL (11-17-20 @ 06:16)  Hemoglobin: 14.1 g/dL (11-16-20 @ 19:14)    Platelet Count - Automated: 302 K/uL (11-17-20 @ 23:22)  Platelet Count - Automated: 304 K/uL (11-17-20 @ 06:16)  Platelet Count - Automated: 331 K/uL (11-16-20 @ 19:14)    Chemical VTE Prophylaxis:  [ ] Lovenox    [ ] SQH   [ ]NA  Systemic Anticogaulation:  [ ] Yes    [ ] No,  If Yes, Medication and Indication:       =======================    ENDOCRINE  =====================  Insulin drip  [ ] Yes    [ ] No  Basal Insulin [ ] Yes    [ ] No  Bolus insulin [ ] Yes    [ ] No  Sliding Scale  [ ] Yes    [ ] No  Hgb A1c            ======================= LINES/TUBES  =====================  [place]: (Date placed)  Central Line: (Date placed)  HD Catheter: (Date placed)  Arterial Line: (Date placed)  Endotracheal Tube: (Date placed)  [place]: (Date placed) CCU Accept Note    Transfer from: (  ) Medicine    ( x ) Telemetry    (  ) RCU                                        (  ) Palliative     (  ) Stroke Unit    ( ) _______________    Accepting Physician: Dr. 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 She had a R sided dual chamber PPM placed 2.5 years ago by a Dr. Moore for sinus bradycardia to 29, otherwise asymptomatic. She was without complications until 10/30 when her cardiology office [place] received a transmission from her PPM that showed "something wrong". She was advised to get the PPM evaluated and saw a Dr. Kent who noted one of the wires was loose. On11/5, he elected to put in a new wire and leave the loose wire in. However, a few days after that procedure the pt started noticing purulent discharge from the pacemaker incision site and presented to [facility]. She was prescribed a 3 day course of clindamycin but the purulent drainage did not resolved. She then called went back to [place] and has since completed 5 out of 20 day course of levofloxacin. On 11/14 she noticed acute sharp L 1st toe foot pain. denies trauma, bleeding, or discharge. Pt reports no hx of gout or pseudogout. She called Dr. Moore's office who recommended she come to [facility] for PPM removal and further evaluation. Pt still reports drainage from the ppm incision site. Of note, pt was advised to stop taking her eliquis and diltiazem on 11/13 as she may get a procedure so she has not taken these medications since 11/13 pm.    Patient was started on vancomycin and zosyn, narrowed to vancomycin only and planned for removal on 11/17. She was treated for acute gout flare in her L right great toe. The pacemaker pocket was opened, all leads freed and anchors removed. She developed atrial fibrillation and underwent DC CV. In the PACU, she had HR to 180 and BP to 220s. She was given lopressor 5 mg IV and metoprolol tartrate 25 mg once. She was transferred to CICU for monitoring and treatment of aflutter. Presently she complains of chest pain at the site of her pocket wound and a sore throat following extubation. Otherwise, she has no acute complaints. Her right groin is without pain. She has left toe pain.    REVIEW OF SYSTEMS:   No fevers, chest pain, shortness of breath, abdominal pain, lower extremity swelling or pain, dysuria, or constipation.    MEDICATIONS  (STANDING):  chlorhexidine 2% Cloths 1 Application(s) Topical at bedtime  hydrALAZINE Injectable 5 milliGRAM(s) IV Push once  vancomycin  IVPB 1000 milliGRAM(s) IV Intermittent every 12 hours    MEDICATIONS  (PRN):  benzocaine 15 mG/menthol 3.6 mG (Sugar-Free) Lozenge 1 Lozenge Oral four times a day PRN Sore Throat      Allergies    latex (Rash)  penicillin (Rash (Mild))    Intolerances        Vital Signs Last 24 Hrs  T(C): 37.2 (17 Nov 2020 22:45), Max: 37.2 (17 Nov 2020 22:45)  T(F): 99 (17 Nov 2020 22:45), Max: 99 (17 Nov 2020 22:45)  HR: 120 (17 Nov 2020 23:00) (59 - 132)  BP: 176/105 (17 Nov 2020 22:45) (115/73 - 190/107)  BP(mean): 144 (17 Nov 2020 22:45) (124 - 145)  RR: 17 (17 Nov 2020 23:00) (16 - 20)  SpO2: 95% (17 Nov 2020 23:00) (95% - 100%)    I&O's Summary      Physical Exam:  General: Well-appearing, NAD  HEENT: PERRL, EOMI, normal sclera and conjunctiva, normal oropharynx  Neck: Supple, JVP at 7 cm.  Chest/Lungs: CTA bilaterally, no wheezing, rales, rhonchi or rub. Right chest wound with wound vac intact, swollen. Tender to palpation.  Heart: Irregularly irregular. No murmurs.  Abdomen: Soft, ND, NTTP, normoactive bowel sounds  Extremities: Left great toe erythematous, tender to light touch. R groin site clean dry and intact.  Skin: Warm, well-perfused, no rashes or lesions  Neurological: A&Ox3, moves all extremities, no focal deficits    LABS:                         14.0   9.68  )-----------( 302      ( 17 Nov 2020 23:22 )             42.9     11-17    142  |  105  |  21  ----------------------------<  101<H>  3.8   |  25  |  1.07    Ca    9.7      17 Nov 2020 06:16  Phos  4.4     11-17  Mg     1.9     11-17    TPro  6.5  /  Alb  3.9  /  TBili  0.2  /  DBili  x   /  AST  17  /  ALT  14  /  AlkPhos  78  11-16    LIVER FUNCTIONS - ( 16 Nov 2020 19:14 )  Alb: 3.9 g/dL / Pro: 6.5 g/dL / ALK PHOS: 78 U/L / ALT: 14 U/L / AST: 17 U/L / GGT: x           PT/INR - ( 16 Nov 2020 19:14 )   PT: 14.5 sec;   INR: 1.22 ratio         PTT - ( 16 Nov 2020 19:14 )  PTT:33.1 sec          ECG: Atrial flutter without ischemic changes    Telemetry (24 Hrs): Atrial flutter 110-130s, fluctuant.    Echocardiogram: No echo    IMAGING: The heart is enlarged. The lungs appear to be clear. No pleural effusion. No pneumothorax. A pacer is seen on the right and the tip of the electrodes are in the right atrium and right ventricular. A left atrial appendage clip is present.    ASSESSMENT & PLAN:    ====================ASSESSMENT AND PLAN==============  75y F PMHx HTN, HLD, hypothyroid, afib (on eliquis), sinus bradycardia s/p PPM p/w pacemaker site and gout s/p pacemaker removal 11/17 c/b aflutter and hypertension  ====================CARDIOVASCULAR==================  ==Hemodynamics==  Hypertensive as home ana maria blocking agents held following pacemaker removal  -Hydralazine 5 mg IV now  -Will decide whether hydralazine 25 mg PO TID or diltiazem drip depending on     ==Pump==    Echo Date:  LVEF:                              Regional Wall Motion Abnormaility?:  [ ]Yes   [ ] No, If Yes, Details  Diastolic function:  RV function:   Any change frim prior?: [ ] Yes   [ ] No, If Yes, Details:   Volume status:    ==Coronaries==    Last ischemic workup:   Antiplatelet regimen:   Anticoagulant:   Statin:   Beta blocker:    ==Rhythm==    Current rhythm:  AM EKG Interpretation:   Anti-arrhythmic therapies:   TVP with settings:         ====================== NEUROLOGY=====================  Sedation [ ]Yes   [ ] No  Delirium [ ]Yes   [ ] No      ==================== RESPIRATORY======================  Mechanical Ventilation [ ]    BIPAP [ ]   HFNC [ ]   NC [ ]                 ===================== RENAL =========================    Renal Replacement Therapy:  [ ] CRRT      [ ] IHD, Last Session:    Fluid removal:     [ ] Diuretic therapy, Regimen:       ==================== GASTROINTESTINAL===================    Diet:  Last BM:   Indication for Stress Ulcer Prophylaxis, [ ] Yes    [ ] No   If Yes, Medication:       ========================INFECTIOUS DISEASE================  T(C): 37.2 (11-17-20 @ 22:45), Max: 37.2 (11-17-20 @ 22:45)  WBC Count: 9.68 K/uL (11-17-20 @ 23:22)  WBC Count: 9.28 K/uL (11-17-20 @ 06:16)  WBC Count: 9.81 K/uL (11-16-20 @ 19:14)        Current Antibiotics/Antifungals with start date:     vancomycin  IVPB 1000 milliGRAM(s) IV Intermittent every 12 hours    ===================HEMATOLOGIC/ONC ===================  Hemoglobin: 14.0 g/dL (11-17-20 @ 23:22)  Hemoglobin: 13.3 g/dL (11-17-20 @ 06:16)  Hemoglobin: 14.1 g/dL (11-16-20 @ 19:14)    Platelet Count - Automated: 302 K/uL (11-17-20 @ 23:22)  Platelet Count - Automated: 304 K/uL (11-17-20 @ 06:16)  Platelet Count - Automated: 331 K/uL (11-16-20 @ 19:14)    Chemical VTE Prophylaxis:  [ ] Lovenox    [ ] SQH   [ ]NA  Systemic Anticogaulation:  [ ] Yes    [ ] No,  If Yes, Medication and Indication:       =======================    ENDOCRINE  =====================  Insulin drip  [ ] Yes    [ ] No  Basal Insulin [ ] Yes    [ ] No  Bolus insulin [ ] Yes    [ ] No  Sliding Scale  [ ] Yes    [ ] No  Hgb A1c            ======================= LINES/TUBES  =====================  [place]: (Date placed)  Central Line: (Date placed)  HD Catheter: (Date placed)  Arterial Line: (Date placed)  Endotracheal Tube: (Date placed)  [place]: (Date placed) CCU Accept Note    Transfer from: (  ) Medicine    ( x ) Telemetry    (  ) RCU                                        (  ) Palliative     (  ) Stroke Unit    ( ) _______________    Accepting Physician: Dr. 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 She had a R sided dual chamber PPM placed 2.5 years ago by a Dr. Moore for sinus bradycardia to 29, otherwise asymptomatic. She was without complications until 10/30 when her cardiology office [place] received a transmission from her PPM that showed "something wrong". She was advised to get the PPM evaluated and saw a Dr. Kent who noted one of the wires was loose. On11/5, he elected to put in a new wire and leave the loose wire in. However, a few days after that procedure the pt started noticing purulent discharge from the pacemaker incision site and presented to [facility]. She was prescribed a 3 day course of clindamycin but the purulent drainage did not resolved. She then called went back to [place] and has since completed 5 out of 20 day course of levofloxacin. On 11/14 she noticed acute sharp L 1st toe foot pain. denies trauma, bleeding, or discharge. Pt reports no hx of gout or pseudogout. She called Dr. Moore's office who recommended she come to [facility] for PPM removal and further evaluation. Pt still reports drainage from the ppm incision site. Of note, pt was advised to stop taking her eliquis and diltiazem on 11/13 as she may get a procedure so she has not taken these medications since 11/13 pm.    Patient was started on vancomycin and zosyn, narrowed to vancomycin only and planned for removal on 11/17. She was treated for acute gout flare in her L right great toe. The pacemaker pocket was opened, all leads freed and anchors removed. She developed atrial fibrillation and underwent DC CV. In the PACU, she had HR to 180 and BP to 220s. She was given lopressor 5 mg IV and metoprolol tartrate 25 mg once. She was transferred to CICU for monitoring and treatment of aflutter. Presently she complains of chest pain at the site of her pocket wound and a sore throat following extubation. Otherwise, she has no acute complaints. Her right groin is without pain. She has left toe pain.    REVIEW OF SYSTEMS:   No fevers, chest pain, shortness of breath, abdominal pain, lower extremity swelling or pain, dysuria, or constipation.    MEDICATIONS  (STANDING):  chlorhexidine 2% Cloths 1 Application(s) Topical at bedtime  hydrALAZINE Injectable 5 milliGRAM(s) IV Push once  vancomycin  IVPB 1000 milliGRAM(s) IV Intermittent every 12 hours    MEDICATIONS  (PRN):  benzocaine 15 mG/menthol 3.6 mG (Sugar-Free) Lozenge 1 Lozenge Oral four times a day PRN Sore Throat      Allergies    latex (Rash)  penicillin (Rash (Mild))    Intolerances        Vital Signs Last 24 Hrs  T(C): 37.2 (17 Nov 2020 22:45), Max: 37.2 (17 Nov 2020 22:45)  T(F): 99 (17 Nov 2020 22:45), Max: 99 (17 Nov 2020 22:45)  HR: 120 (17 Nov 2020 23:00) (59 - 132)  BP: 176/105 (17 Nov 2020 22:45) (115/73 - 190/107)  BP(mean): 144 (17 Nov 2020 22:45) (124 - 145)  RR: 17 (17 Nov 2020 23:00) (16 - 20)  SpO2: 95% (17 Nov 2020 23:00) (95% - 100%)    I&O's Summary      Physical Exam:  General: Well-appearing, NAD  HEENT: PERRL, EOMI, normal sclera and conjunctiva, normal oropharynx  Neck: Supple, JVP at 7 cm.  Chest/Lungs: CTA bilaterally, no wheezing, rales, rhonchi or rub. Right chest wound with wound vac intact, swollen. Tender to palpation.  Heart: Irregularly irregular. No murmurs.  Abdomen: Soft, ND, NTTP, normoactive bowel sounds  Extremities: Left great toe erythematous, tender to light touch. R groin site clean dry and intact.  Skin: Warm, well-perfused, no rashes or lesions  Neurological: A&Ox3, moves all extremities, no focal deficits    LABS:                         14.0   9.68  )-----------( 302      ( 17 Nov 2020 23:22 )             42.9     11-17    142  |  105  |  21  ----------------------------<  101<H>  3.8   |  25  |  1.07    Ca    9.7      17 Nov 2020 06:16  Phos  4.4     11-17  Mg     1.9     11-17    TPro  6.5  /  Alb  3.9  /  TBili  0.2  /  DBili  x   /  AST  17  /  ALT  14  /  AlkPhos  78  11-16    LIVER FUNCTIONS - ( 16 Nov 2020 19:14 )  Alb: 3.9 g/dL / Pro: 6.5 g/dL / ALK PHOS: 78 U/L / ALT: 14 U/L / AST: 17 U/L / GGT: x           PT/INR - ( 16 Nov 2020 19:14 )   PT: 14.5 sec;   INR: 1.22 ratio         PTT - ( 16 Nov 2020 19:14 )  PTT:33.1 sec          ECG: Atrial flutter without ischemic changes    Telemetry (24 Hrs): Atrial flutter 110-130s, fluctuant.    Echocardiogram: No echo    IMAGING: The heart is enlarged. The lungs appear to be clear. No pleural effusion. No pneumothorax. A pacer is seen on the right and the tip of the electrodes are in the right atrium and right ventricular. A left atrial appendage clip is present.    ASSESSMENT & PLAN:    ====================ASSESSMENT AND PLAN==============  75y F PMHx HTN, HLD, hypothyroid, afib (on eliquis), sinus bradycardia s/p PPM p/w pacemaker site infection and gout s/p pacemaker removal 11/17 c/b aflutter and hypertension  ====================CARDIOVASCULAR==================  ==Hemodynamics==  Hypertensive as home ana maria blocking agents held following pacemaker removal  -Hydralazine 5 mg IV now  -Will decide whether hydralazine 25 mg PO TID or diltiazem drip depending on needs for rate control.    ==Rhythm==  Atrial flutter iso history of sinus bradycardia to 29 s/p ppm now removed  -S/p metorpolol tartrate 25 mg PO in PACU  -If HR, controlled then will continue with metoprolol tartrate 25 mg PO BID  -If HR, is not controlled (HR consistently > 130) then will begin diltiazem drip for rate control  -F/u EP for further recommendations  -Hold anticoagulation    ====================== NEUROLOGY=====================  No active issues    ==================== RESPIRATORY======================  No active issues. S/p extubation.    ===================== RENAL =========================  No active issues  -Cr 1.07, continue to trend    ==================== GASTROINTESTINAL===================    Diet: Regular diet    ========================INFECTIOUS DISEASE================  Pocket infection  -C/w vancomycin 1 g BID (11/17 AM - )  -S/p Zosyn x1 11/17  -Vancomycin trough before 4th dose. Next at 11/18 prior to PM dose  -F/u blood cultures  -F/u cultures from pocket from procedure  -C/w wound vac  -Plastics consult in AM for wound  -Apprec ID recs    ===================HEMATOLOGIC/ONC ===================  -Holding AC post-procedure for a flutter as above.    =======================    ENDOCRINE  =====================  Hypothyroidism  -F/u TSH  -C/w home regimen of levothyroxine alternating 50/75 mcg every other day CCU Accept Note    Transfer from: (  ) Medicine    ( x ) Telemetry    (  ) RCU                                        (  ) Palliative     (  ) Stroke Unit    ( ) _______________    Accepting Physician: Dr. 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 She had a R sided dual chamber PPM placed 2.5 years ago by a Dr. Moore for sinus bradycardia to 29, otherwise asymptomatic. She was without complications until 10/30 when her cardiology office [place] received a transmission from her PPM that showed "something wrong". She was advised to get the PPM evaluated and saw a Dr. Kent who noted one of the wires was loose. On11/5, he elected to put in a new wire and leave the loose wire in. However, a few days after that procedure the pt started noticing purulent discharge from the pacemaker incision site and presented to [facility]. She was prescribed a 3 day course of clindamycin but the purulent drainage did not resolved. She then called went back to [place] and has since completed 5 out of 20 day course of levofloxacin. On 11/14 she noticed acute sharp L 1st toe foot pain. denies trauma, bleeding, or discharge. Pt reports no hx of gout or pseudogout. She called Dr. Moore's office who recommended she come to [facility] for PPM removal and further evaluation. Pt still reports drainage from the ppm incision site. Of note, pt was advised to stop taking her eliquis and diltiazem on 11/13 as she may get a procedure so she has not taken these medications since 11/13 pm.    Patient was started on vancomycin and zosyn, narrowed to vancomycin only and planned for removal on 11/17. She was treated for acute gout flare in her L right great toe. The pacemaker pocket was opened, all leads freed and anchors removed. She developed atrial fibrillation and underwent DC CV. In the PACU, she had HR to 180 and BP to 220s. She was given lopressor 5 mg IV and metoprolol tartrate 25 mg once. She was transferred to CICU for monitoring and treatment of aflutter. Presently she complains of chest pain at the site of her pocket wound and a sore throat following extubation. Otherwise, she has no acute complaints. Her right groin is without pain. She has left toe pain.    REVIEW OF SYSTEMS:   No fevers, chest pain, shortness of breath, abdominal pain, lower extremity swelling or pain, dysuria, or constipation.    MEDICATIONS  (STANDING):  chlorhexidine 2% Cloths 1 Application(s) Topical at bedtime  hydrALAZINE Injectable 5 milliGRAM(s) IV Push once  vancomycin  IVPB 1000 milliGRAM(s) IV Intermittent every 12 hours    MEDICATIONS  (PRN):  benzocaine 15 mG/menthol 3.6 mG (Sugar-Free) Lozenge 1 Lozenge Oral four times a day PRN Sore Throat      Allergies    latex (Rash)  penicillin (Rash (Mild))    Intolerances        Vital Signs Last 24 Hrs  T(C): 37.2 (17 Nov 2020 22:45), Max: 37.2 (17 Nov 2020 22:45)  T(F): 99 (17 Nov 2020 22:45), Max: 99 (17 Nov 2020 22:45)  HR: 120 (17 Nov 2020 23:00) (59 - 132)  BP: 176/105 (17 Nov 2020 22:45) (115/73 - 190/107)  BP(mean): 144 (17 Nov 2020 22:45) (124 - 145)  RR: 17 (17 Nov 2020 23:00) (16 - 20)  SpO2: 95% (17 Nov 2020 23:00) (95% - 100%)    I&O's Summary      Physical Exam:  General: Well-appearing, NAD  HEENT: PERRL, EOMI, normal sclera and conjunctiva, normal oropharynx  Neck: Supple, JVP at 7 cm.  Chest/Lungs: CTA bilaterally, no wheezing, rales, rhonchi or rub. Right chest wound with wound vac intact, swollen. Tender to palpation.  Heart: Irregularly irregular. No murmurs.  Abdomen: Soft, ND, NTTP, normoactive bowel sounds  Extremities: Left great toe erythematous, tender to light touch. R groin site clean dry and intact.  Skin: Warm, well-perfused, no rashes or lesions  Neurological: A&Ox3, moves all extremities, no focal deficits    LABS:                         14.0   9.68  )-----------( 302      ( 17 Nov 2020 23:22 )             42.9     11-17    142  |  105  |  21  ----------------------------<  101<H>  3.8   |  25  |  1.07    Ca    9.7      17 Nov 2020 06:16  Phos  4.4     11-17  Mg     1.9     11-17    TPro  6.5  /  Alb  3.9  /  TBili  0.2  /  DBili  x   /  AST  17  /  ALT  14  /  AlkPhos  78  11-16    LIVER FUNCTIONS - ( 16 Nov 2020 19:14 )  Alb: 3.9 g/dL / Pro: 6.5 g/dL / ALK PHOS: 78 U/L / ALT: 14 U/L / AST: 17 U/L / GGT: x           PT/INR - ( 16 Nov 2020 19:14 )   PT: 14.5 sec;   INR: 1.22 ratio         PTT - ( 16 Nov 2020 19:14 )  PTT:33.1 sec          ECG: Atrial flutter without ischemic changes    Telemetry (24 Hrs): Atrial flutter 110-130s, fluctuant. Conversion to sinus with intermittent second degree AV block and bradycardia to 30s with normal BP on a-line.    Echocardiogram: No echo    IMAGING: The heart is enlarged. The lungs appear to be clear. No pleural effusion. No pneumothorax. A pacer is seen on the right and the tip of the electrodes are in the right atrium and right ventricular. A left atrial appendage clip is present.    ASSESSMENT & PLAN:    ====================ASSESSMENT AND PLAN==============  75y F PMHx HTN, HLD, hypothyroid, afib (on eliquis), sinus bradycardia s/p PPM p/w pacemaker site infection and gout s/p pacemaker removal 11/17 c/b aflutter and hypertension  ====================CARDIOVASCULAR==================  ==Hemodynamics==  Hypertensive as home ana maria blocking agents held following pacemaker removal  -Hydralazine 5 mg IV now  -Tolerate asymptomatic essential hypertension in setting of second degree av block.  -Will prefer hydralazine IV if hypertensive above 190s for b/c short acting and not blocking AV node    ==Rhythm==  Atrial flutter w/ conversion to NSR with 2nd degree AV block   -History of sinus bradycardia to 29 s/p ppm now removed  -S/p metorpolol tartrate 25 mg PO in PACU  -Will hold on any AV ana maria blockig agents for now  -F/u EP for further recommendations  -Hold anticoagulation    ====================== NEUROLOGY=====================  No active issues    ==================== RESPIRATORY======================  No active issues. S/p extubation.    ===================== RENAL =========================  No active issues  -Cr 1.07, continue to trend    ==================== GASTROINTESTINAL===================    Diet: Regular diet    ========================INFECTIOUS DISEASE================  Pocket infection  -C/w vancomycin 1 g BID (11/17 AM - )  -S/p Zosyn x1 11/17  -Vancomycin trough before 4th dose. Next at 11/18 prior to PM dose  -F/u blood cultures  -F/u cultures from pocket from procedure  -C/w wound vac  -Plastics consult in AM for wound  -Apprec ID recs    ===================HEMATOLOGIC/ONC ===================  -Holding AC post-procedure for a flutter as above.    =======================    ENDOCRINE  =====================  Hypothyroidism  -F/u TSH  -C/w home regimen of levothyroxine alternating 50/75 mcg every other day CCU Accept Note    Transfer from: (  ) Medicine    ( x ) Telemetry    (  ) RCU                                        (  ) Palliative     (  ) Stroke Unit    ( ) _______________    Accepting Physician: Dr. 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 She had a R sided dual chamber PPM placed 2.5 years ago by a Dr. Moore for sinus bradycardia to 29, otherwise asymptomatic. She was without complications until 10/30 when her cardiology office [place] received a transmission from her PPM that showed "something wrong". She was advised to get the PPM evaluated and saw a Dr. Kent who noted one of the wires was loose. On11/5, he elected to put in a new wire and leave the loose wire in. However, a few days after that procedure the pt started noticing purulent discharge from the pacemaker incision site and presented to [facility]. She was prescribed a 3 day course of clindamycin but the purulent drainage did not resolved. She then called went back to [place] and has since completed 5 out of 20 day course of levofloxacin. On 11/14 she noticed acute sharp L 1st toe foot pain. denies trauma, bleeding, or discharge. Pt reports no hx of gout or pseudogout. She called Dr. Moore's office who recommended she come to [facility] for PPM removal and further evaluation. Pt still reports drainage from the ppm incision site. Of note, pt was advised to stop taking her eliquis and diltiazem on 11/13 as she may get a procedure so she has not taken these medications since 11/13 pm.    Patient was started on vancomycin and zosyn, narrowed to vancomycin only and planned for removal on 11/17. She was treated for acute gout flare in her L right great toe. The pacemaker pocket was opened, all leads freed and anchors removed. She developed atrial fibrillation and underwent DC CV. In the PACU, she had HR to 180 and BP to 220s. She was given lopressor 5 mg IV and metoprolol tartrate 25 mg once. She was transferred to CICU for monitoring and treatment of aflutter. Presently she complains of chest pain at the site of her pocket wound and a sore throat following extubation. Otherwise, she has no acute complaints. Her right groin is without pain. She has left toe pain.    REVIEW OF SYSTEMS:   No fevers, chest pain, shortness of breath, abdominal pain, lower extremity swelling or pain, dysuria, or constipation.    MEDICATIONS  (STANDING):  chlorhexidine 2% Cloths 1 Application(s) Topical at bedtime  hydrALAZINE Injectable 5 milliGRAM(s) IV Push once  vancomycin  IVPB 1000 milliGRAM(s) IV Intermittent every 12 hours    MEDICATIONS  (PRN):  benzocaine 15 mG/menthol 3.6 mG (Sugar-Free) Lozenge 1 Lozenge Oral four times a day PRN Sore Throat      Allergies    latex (Rash)  penicillin (Rash (Mild))    Intolerances        Vital Signs Last 24 Hrs  T(C): 37.2 (17 Nov 2020 22:45), Max: 37.2 (17 Nov 2020 22:45)  T(F): 99 (17 Nov 2020 22:45), Max: 99 (17 Nov 2020 22:45)  HR: 120 (17 Nov 2020 23:00) (59 - 132)  BP: 176/105 (17 Nov 2020 22:45) (115/73 - 190/107)  BP(mean): 144 (17 Nov 2020 22:45) (124 - 145)  RR: 17 (17 Nov 2020 23:00) (16 - 20)  SpO2: 95% (17 Nov 2020 23:00) (95% - 100%)    I&O's Summary      Physical Exam:  General: Well-appearing, NAD  HEENT: PERRL, EOMI, normal sclera and conjunctiva, normal oropharynx  Neck: Supple, JVP at 7 cm.  Chest/Lungs: CTA bilaterally, no wheezing, rales, rhonchi or rub. Right chest wound with wound vac intact, swollen. Tender to palpation.  Heart: Irregularly irregular. No murmurs.  Abdomen: Soft, ND, NTTP, normoactive bowel sounds  Extremities: Left great toe erythematous, tender to light touch. R groin site clean dry and intact.  Skin: Warm, well-perfused, no rashes or lesions  Neurological: A&Ox3, moves all extremities, no focal deficits    LABS:                         14.0   9.68  )-----------( 302      ( 17 Nov 2020 23:22 )             42.9     11-17    142  |  105  |  21  ----------------------------<  101<H>  3.8   |  25  |  1.07    Ca    9.7      17 Nov 2020 06:16  Phos  4.4     11-17  Mg     1.9     11-17    TPro  6.5  /  Alb  3.9  /  TBili  0.2  /  DBili  x   /  AST  17  /  ALT  14  /  AlkPhos  78  11-16    LIVER FUNCTIONS - ( 16 Nov 2020 19:14 )  Alb: 3.9 g/dL / Pro: 6.5 g/dL / ALK PHOS: 78 U/L / ALT: 14 U/L / AST: 17 U/L / GGT: x           PT/INR - ( 16 Nov 2020 19:14 )   PT: 14.5 sec;   INR: 1.22 ratio         PTT - ( 16 Nov 2020 19:14 )  PTT:33.1 sec          ECG: Atrial flutter without ischemic changes    Telemetry (24 Hrs): Atrial flutter 110-130s, fluctuant. Conversion to sinus with intermittent second degree AV block and bradycardia to 30s with normal BP on a-line.    Echocardiogram: No echo    IMAGING: The heart is enlarged. The lungs appear to be clear. No pleural effusion. No pneumothorax. A pacer is seen on the right and the tip of the electrodes are in the right atrium and right ventricular. A left atrial appendage clip is present.    ASSESSMENT & PLAN:    ====================ASSESSMENT AND PLAN==============  75y F PMHx HTN, HLD, hypothyroid, afib (on eliquis), sinus bradycardia s/p PPM p/w pacemaker site infection and gout s/p pacemaker removal 11/17 c/b aflutter and hypertension  ====================CARDIOVASCULAR==================  ==Hemodynamics==  Hypertensive as home ana maria blocking agents held following pacemaker removal  -Hydralazine 5 mg IV now  -Tolerate asymptomatic essential hypertension in setting of second degree av block.  -Will prefer hydralazine IV if hypertensive above 190s for b/c short acting and not blocking AV node    ==Rhythm==  Atrial flutter w/ conversion to NSR with 2nd degree AV block   -History of sinus bradycardia to 29 s/p ppm now removed  -S/p metorpolol tartrate 25 mg PO in PACU  -Will hold on any AV ana maria blockig agents for now  -F/u EP for further recommendations  -Hold anticoagulation    ====================== NEUROLOGY=====================  No active issues    ==================== RESPIRATORY======================  No active issues. S/p extubation.    ===================== RENAL =========================  No active issues  -Cr 1.07, continue to trend    ==================== GASTROINTESTINAL===================    Diet: Regular diet    ========================INFECTIOUS DISEASE================  Pocket infection  -C/w vancomycin 1 g BID (11/17 AM - )  -S/p Zosyn x1 11/17  -Vancomycin trough before 4th dose. Next at 11/18 prior to PM dose  -F/u blood cultures  -F/u cultures from pocket from procedure  -C/w wound vac  -Plastics consult in AM for wound  -Apprec ID recs    ===================HEMATOLOGIC/ONC ===================  -Holding AC post-procedure for a flutter as above.    ===================RHEUMATOLOGIC ===================  Acute gout flare  -C/w colchicine 0.6 mg daily until 2-3 days after flare resolves  -Would favor c/w colchicine over NSAID as patient just had procedure and reducing bleeding    =======================    ENDOCRINE  =====================  Hypothyroidism  -F/u TSH  -C/w home regimen of levothyroxine alternating 50/75 mcg every other day

## 2020-11-17 NOTE — CONSULT NOTE ADULT - ATTENDING COMMENTS
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 Moore for sinus bradycardia to 29, otherwise asymptomatic  She was advised to get the PPM evaluated and saw a Dr. Kent who noted one of the wires was loose  On 11/5, he elected to put in a new wire and leave the loose wire in  Patient with discharge from site and pain  No fevers, no leukocytosis  CXR clear, ppm in place, reviewed personally  Given discharge from site, high concern for pocket infection agree with plan for removal  Overall,  1) Suspected PPM infection  - Discharge from site in setting of recent device manipulation  - Continue Vanco 1g q 12 (monitor levels)  - Zosyn 3.375g q 8  - F/U pending BCX  - Agree for plan for extraction with EP, follow up (would send culture of discharge from procedure to determine pathogen)  2) L 1st toe pain (gout?)  - Further care per primary team    Harvinder Gamez MD  Pager [phone]  After 5pm and on weekends call [phone]    I was physically present for the key portions of the evaluation and management service provided. I saw and examined the patient. I agree with the above history, physical, and plan except for any discrepancies which I have documented in “Attending Attestation.” Please refer to “Attending Attestation” for final plan.

## 2020-11-17 NOTE — PROGRESS NOTE ADULT - SUBJECTIVE AND OBJECTIVE BOX
24H hour events: Pt with no other complaints besides PPM pocket infection.     MEDICATIONS:  furosemide    Tablet 30 milliGRAM(s) Oral daily  losartan 100 milliGRAM(s) Oral daily  piperacillin/tazobactam IVPB.. 3.375 Gram(s) IV Intermittent every 8 hours  vancomycin  IVPB      vancomycin  IVPB 1000 milliGRAM(s) IV Intermittent every 12 hours  oxycodone    5 mG/acetaminophen 325 mG 1 Tablet(s) Oral every 6 hours PRN  temazepam 30 milliGRAM(s) Oral at bedtime PRN  levothyroxine 75 MICROGram(s) Oral <User Schedule>      REVIEW OF SYSTEMS:  Complete 10point ROS negative.    PHYSICAL EXAM:  T(C): 36.6 (11-17-20 @ 04:45), Max: 36.9 (11-16-20 @ 23:20)  HR: 60 (11-17-20 @ 04:45) (59 - 68)  BP: 143/84 (11-17-20 @ 04:45) (138/64 - 194/102)  RR: 18 (11-17-20 @ 04:45) (17 - 18)  SpO2: 97% (11-17-20 @ 04:45) (97% - 100%)  Wt(kg): --  I&O's Summary      Appearance: Normal	  Cardiovascular: Normal S1 S2, No JVD, No murmurs  Respiratory: Lungs clear to auscultation	  Psychiatry: A & O x 3, Mood & affect appropriate  Gastrointestinal:  Soft, Non-tender, + BS	  Skin: Right chest wall PPM site + erythema/drainage. Left bunion + swelling and erythema    Extremities: Normal range of motion, No clubbing, cyanosis or edema  Vascular: Peripheral pulses palpable 2+ bilaterally        LABS:	 	    CBC Full  -  ( 17 Nov 2020 06:16 )  WBC Count : 9.28 K/uL  Hemoglobin : 13.3 g/dL  Hematocrit : 40.2 %  Platelet Count - Automated : 304 K/uL  Mean Cell Volume : 91.4 fl  Mean Cell Hemoglobin : 30.2 pg  Mean Cell Hemoglobin Concentration : 33.1 gm/dL  Auto Neutrophil # : x  Auto Lymphocyte # : x  Auto Monocyte # : x  Auto Eosinophil # : x  Auto Basophil # : x  Auto Neutrophil % : x  Auto Lymphocyte % : x  Auto Monocyte % : x  Auto Eosinophil % : x  Auto Basophil % : x    11-17    142  |  105  |  21  ----------------------------<  101<H>  3.8   |  25  |  1.07  11-16    144  |  105  |  21  ----------------------------<  117<H>  4.3   |  28  |  1.17    Ca    9.7      17 Nov 2020 06:16  Ca    10.1      16 Nov 2020 19:14  Phos  4.4     11-17  Mg     1.9     11-17    TPro  6.5  /  Alb  3.9  /  TBili  0.2  /  DBili  x   /  AST  17  /  ALT  14  /  AlkPhos  78  11-16    Blood culture x 1 set sent on 11/17/20: result pending     TELEMETRY: AV pace<-->A pace/V sense at 60's

## 2020-11-17 NOTE — PROGRESS NOTE ADULT - PROBLEM SELECTOR PLAN 3
Pt currently asymtpomatic, in a-paced rhythm  - ?sinus node dysfxn  - f/u ep recs and need for interrogation PPM mgmt as above.

## 2020-11-17 NOTE — CHART NOTE - NSCHARTNOTEFT_GEN_A_CORE
BRIEF PROCEDURE NOTE    CIRILO ROLDAN  [id number]    Pre-op Diagnosis: Infected pacemaker pocket    Post-op Diagnosis: Same    Procedure: PCM/Lead extraction, wound Vac placement    Electrophysiologist: Ioana Moore MD    Anesthesia: GA    Access:  LFV    Description:  6Fr sheath RFV using Seldinger technique  Amplatz wire to RIJ serve as guide for bridge balloon if needed    Local with 0.5% Marcain  Pacemaker pocket opened/PCM removed  Leads freed from scar in pocket, anchors removed  Active fixation retracted, New A/V lead and old V lead removed with traction  Locking stylets placed distally and #5 silk tied to outside of old A lead  Lead removed with 14 Fr laser sheath  wound debrided, irrigated with antibiotics and wound Vac placed    the patient developed atrial fibrillation during the procedure and underwent DC CV, she had ERAF and went back in rate controlled AF    Complications: none    Tissue an swabs of pocket sent for culture    EBL: 20cc    Disposition: to recovery/stable    Plan:  CXR  Vanco 1gm q12hrs  ECG  NO HEPARIN/LOVINOX for now  bed rest 4 hours  groin checks  ID consult  PT consult for wound vac  plastics consult  Hold coreg, dig

## 2020-11-17 NOTE — PROGRESS NOTE ADULT - PROBLEM SELECTOR PLAN 7
- f/u TSH  - pt takes levothyroxine 50 mcg one day followed by 75 mcg the next day  - c/w as per pt's home regimen - pt takes levothyroxine 50 mcg alternative w/ 75 mcg daily.  - F/u TSH prior to further adjustment of inpt synthroid dose.

## 2020-11-17 NOTE — PROGRESS NOTE ADULT - ASSESSMENT
75y F PMHx HTN, HLD, hypothyroid, afib (on eliquis), sinus bradycardia s/p PPM p/w pacemaker site infection and toe pain. 75y F PMHx HTN, HLD, hypothyroid, afib (on eliquis), sinus bradycardia s/p PPM p/w pacemaker site infection and gout flair.

## 2020-11-17 NOTE — PROGRESS NOTE ADULT - PROBLEM SELECTOR PLAN 5
- c/w coreg 3.125 mg BID  - c/w losartan 100 mg qd  - c/w lasix 30 mg qd - c/w losartan 100 mg qd  - c/w lasix 30 mg qd

## 2020-11-17 NOTE — PROGRESS NOTE ADULT - SUBJECTIVE AND OBJECTIVE BOX
BRIEF PROCEDURE NOTE    CIRILO ROLDAN  [id number]    Pre-op Diagnosis: Pacemaker site infection    Post-op Diagnosis: Same    Procedure: Pacemaker/lead extraction    Electrophysiologist: Ioana Moore MD    Fellow: Megan Garibay MD    Anesthesia: GA      Description:  6Fr sheath RFV using Seldinger technique  Amplatz wire to RIJ serve as guide for bridge balloon if needed    Local with 0.5% Marcain  pacemaker pokcet opened/pacemaker removed  Leads freed from scar in pocket, anchors removed  3 leads came out with gentle traction.  Active fixation retracted and lead cut.  Locking stylets x 1  placed distally and #5 silk tied to outside of lead  Lead removed with 16 Fr laser sheath x 1    Complications: none    EBL: 10cc    Disposition: to recovery/stable    Plan:  Stat CXR portable  PA/Lat in AM  Vancomycin 1gm q12 hr   ECG  No heparin/lovenox.  Hold patients Xarelto.  Plastics evaluation.  Wound vac managment.  Hold AV ana maria blocking agents including coreg, digoxin, diltiazem  ID evaluation.  Pain medications prn.  Bed rest for 4 hours.  Monitor right groin for hematoma.

## 2020-11-17 NOTE — PROGRESS NOTE ADULT - PROBLEM SELECTOR PLAN 2
L first metatarsal joint pain with erythema, possible gout vs pseudogout. DDx includes less likely endocarditis given infected pacemaker pocket, recent instrumentation- pt afebrile, vitals otherwise stable, no leukocytosis, no murmur on exam, and no SOB  - pain regimen: tylenol 650 mg q6h prn  - ?f/u EP recs for [facility] to r/o endocarditis  - rheum consult in AM for further eval of toe  - percocet prn for pain Left 1st MTP arthralgia. Clinically appears to be gout vs less likely pseudogout given location. Doubt septic arthritis. Doubt IE emboli.   Colchicine 1.2 mg po x1 and 0.6 mg po daily.  Will avoid nsaids given nephrotoxicty risk and avoid steroids in setting of infection.   No clear indication for inpatient rheum evaluation at this time.

## 2020-11-17 NOTE — PRE-ANESTHESIA EVALUATION ADULT - NSANTHOSAYNRD_GEN_A_CORE
No. JUANI screening performed.  STOP BANG Legend: 0-2 = LOW Risk; 3-4 = INTERMEDIATE Risk; 5-8 = HIGH Risk

## 2020-11-17 NOTE — PROVIDER CONTACT NOTE (OTHER) - ASSESSMENT
Patient alert and oriented x 4. VSS. Asymptomatic. Admitted from ED and placed on tele with 3 episodes of converting back and forth from SR/ to Aflutter/Vpaced with 's. Now SR/ 60's. A/C on hold for possible PPM removal.

## 2020-11-17 NOTE — PROVIDER CONTACT NOTE (OTHER) - SITUATION
Patient baseline SR/ HR 60's with 3 episodes of converting to AF lasting 5 min ('s), 3 min than 2 min. Patient self converted to SR vpaced now

## 2020-11-18 LAB
ALBUMIN SERPL ELPH-MCNC: 3.6 G/DL — SIGNIFICANT CHANGE UP (ref 3.3–5)
ALP SERPL-CCNC: 68 U/L — SIGNIFICANT CHANGE UP (ref 40–120)
ALT FLD-CCNC: 13 U/L — SIGNIFICANT CHANGE UP (ref 10–45)
ANION GAP SERPL CALC-SCNC: 14 MMOL/L — SIGNIFICANT CHANGE UP (ref 5–17)
AST SERPL-CCNC: 16 U/L — SIGNIFICANT CHANGE UP (ref 10–40)
BILIRUB SERPL-MCNC: 0.7 MG/DL — SIGNIFICANT CHANGE UP (ref 0.2–1.2)
BUN SERPL-MCNC: 18 MG/DL — SIGNIFICANT CHANGE UP (ref 7–23)
CALCIUM SERPL-MCNC: 9.1 MG/DL — SIGNIFICANT CHANGE UP (ref 8.4–10.5)
CHLORIDE SERPL-SCNC: 103 MMOL/L — SIGNIFICANT CHANGE UP (ref 96–108)
CHOLEST SERPL-MCNC: 200 MG/DL — HIGH
CO2 SERPL-SCNC: 26 MMOL/L — SIGNIFICANT CHANGE UP (ref 22–31)
CREAT SERPL-MCNC: 0.95 MG/DL — SIGNIFICANT CHANGE UP (ref 0.5–1.3)
GLUCOSE SERPL-MCNC: 127 MG/DL — HIGH (ref 70–99)
GRAM STN FLD: SIGNIFICANT CHANGE UP
HCT VFR BLD CALC: 41.6 % — SIGNIFICANT CHANGE UP (ref 34.5–45)
HDLC SERPL-MCNC: 47 MG/DL — LOW
HGB BLD-MCNC: 13.3 G/DL — SIGNIFICANT CHANGE UP (ref 11.5–15.5)
LIPID PNL WITH DIRECT LDL SERPL: 106 MG/DL — HIGH
MAGNESIUM SERPL-MCNC: 2.3 MG/DL — SIGNIFICANT CHANGE UP (ref 1.6–2.6)
MCHC RBC-ENTMCNC: 29.6 PG — SIGNIFICANT CHANGE UP (ref 27–34)
MCHC RBC-ENTMCNC: 32 GM/DL — SIGNIFICANT CHANGE UP (ref 32–36)
MCV RBC AUTO: 92.7 FL — SIGNIFICANT CHANGE UP (ref 80–100)
NIGHT BLUE STAIN TISS: SIGNIFICANT CHANGE UP
NON HDL CHOLESTEROL: 153 MG/DL — HIGH
NRBC # BLD: 0 /100 WBCS — SIGNIFICANT CHANGE UP (ref 0–0)
PHOSPHATE SERPL-MCNC: 4.3 MG/DL — SIGNIFICANT CHANGE UP (ref 2.5–4.5)
PLATELET # BLD AUTO: 306 K/UL — SIGNIFICANT CHANGE UP (ref 150–400)
POTASSIUM SERPL-MCNC: 3.8 MMOL/L — SIGNIFICANT CHANGE UP (ref 3.5–5.3)
POTASSIUM SERPL-SCNC: 3.8 MMOL/L — SIGNIFICANT CHANGE UP (ref 3.5–5.3)
PROT SERPL-MCNC: 6 G/DL — SIGNIFICANT CHANGE UP (ref 6–8.3)
RBC # BLD: 4.49 M/UL — SIGNIFICANT CHANGE UP (ref 3.8–5.2)
RBC # FLD: 12.9 % — SIGNIFICANT CHANGE UP (ref 10.3–14.5)
SARS-COV-2 IGG SERPL QL IA: NEGATIVE — SIGNIFICANT CHANGE UP
SARS-COV-2 IGM SERPL IA-ACNC: <0.1 INDEX — SIGNIFICANT CHANGE UP
SODIUM SERPL-SCNC: 143 MMOL/L — SIGNIFICANT CHANGE UP (ref 135–145)
SPECIMEN SOURCE: SIGNIFICANT CHANGE UP
SPECIMEN SOURCE: SIGNIFICANT CHANGE UP
TRIGL SERPL-MCNC: 238 MG/DL — HIGH
TSH SERPL-MCNC: 1.09 UIU/ML — SIGNIFICANT CHANGE UP (ref 0.27–4.2)
TSH SERPL-MCNC: 1.43 UIU/ML — SIGNIFICANT CHANGE UP (ref 0.27–4.2)
URATE SERPL-MCNC: 6.4 MG/DL — SIGNIFICANT CHANGE UP (ref 2.5–7)
WBC # BLD: 10.89 K/UL — HIGH (ref 3.8–10.5)
WBC # FLD AUTO: 10.89 K/UL — HIGH (ref 3.8–10.5)

## 2020-11-18 PROCEDURE — 99233 SBSQ HOSP IP/OBS HIGH 50: CPT | Mod: GC,25

## 2020-11-18 PROCEDURE — 71045 X-RAY EXAM CHEST 1 VIEW: CPT | Mod: 26

## 2020-11-18 PROCEDURE — 93010 ELECTROCARDIOGRAM REPORT: CPT | Mod: 76,77

## 2020-11-18 PROCEDURE — 99291 CRITICAL CARE FIRST HOUR: CPT

## 2020-11-18 PROCEDURE — 99232 SBSQ HOSP IP/OBS MODERATE 35: CPT

## 2020-11-18 PROCEDURE — 93010 ELECTROCARDIOGRAM REPORT: CPT | Mod: 76

## 2020-11-18 PROCEDURE — 99024 POSTOP FOLLOW-UP VISIT: CPT

## 2020-11-18 PROCEDURE — 99292 CRITICAL CARE ADDL 30 MIN: CPT

## 2020-11-18 RX ORDER — POLYETHYLENE GLYCOL 3350 17 G/17G
17 POWDER, FOR SOLUTION ORAL EVERY 24 HOURS
Refills: 0 | Status: DISCONTINUED | OUTPATIENT
Start: 2020-11-18 | End: 2020-11-20

## 2020-11-18 RX ORDER — ACETAMINOPHEN 500 MG
1000 TABLET ORAL ONCE
Refills: 0 | Status: COMPLETED | OUTPATIENT
Start: 2020-11-18 | End: 2020-11-18

## 2020-11-18 RX ORDER — TEMAZEPAM 15 MG/1
30 CAPSULE ORAL AT BEDTIME
Refills: 0 | Status: DISCONTINUED | OUTPATIENT
Start: 2020-11-18 | End: 2020-11-25

## 2020-11-18 RX ORDER — OXYCODONE HYDROCHLORIDE 5 MG/1
5 TABLET ORAL ONCE
Refills: 0 | Status: DISCONTINUED | OUTPATIENT
Start: 2020-11-18 | End: 2020-11-18

## 2020-11-18 RX ORDER — PIPERACILLIN AND TAZOBACTAM 4; .5 G/20ML; G/20ML
3.38 INJECTION, POWDER, LYOPHILIZED, FOR SOLUTION INTRAVENOUS ONCE
Refills: 0 | Status: COMPLETED | OUTPATIENT
Start: 2020-11-18 | End: 2020-11-18

## 2020-11-18 RX ORDER — PITAVASTATIN CALCIUM 1.04 MG/1
2 TABLET, FILM COATED ORAL DAILY
Refills: 0 | Status: DISCONTINUED | OUTPATIENT
Start: 2020-11-18 | End: 2020-11-25

## 2020-11-18 RX ORDER — POTASSIUM CHLORIDE 20 MEQ
10 PACKET (EA) ORAL ONCE
Refills: 0 | Status: COMPLETED | OUTPATIENT
Start: 2020-11-18 | End: 2020-11-18

## 2020-11-18 RX ORDER — SENNA PLUS 8.6 MG/1
2 TABLET ORAL AT BEDTIME
Refills: 0 | Status: DISCONTINUED | OUTPATIENT
Start: 2020-11-18 | End: 2020-11-19

## 2020-11-18 RX ORDER — CARVEDILOL PHOSPHATE 80 MG/1
6.25 CAPSULE, EXTENDED RELEASE ORAL EVERY 12 HOURS
Refills: 0 | Status: DISCONTINUED | OUTPATIENT
Start: 2020-11-18 | End: 2020-11-19

## 2020-11-18 RX ORDER — POTASSIUM CHLORIDE 20 MEQ
20 PACKET (EA) ORAL ONCE
Refills: 0 | Status: COMPLETED | OUTPATIENT
Start: 2020-11-18 | End: 2020-11-18

## 2020-11-18 RX ORDER — METOPROLOL TARTRATE 50 MG
2.5 TABLET ORAL ONCE
Refills: 0 | Status: COMPLETED | OUTPATIENT
Start: 2020-11-18 | End: 2020-11-18

## 2020-11-18 RX ORDER — CARVEDILOL PHOSPHATE 80 MG/1
3.12 CAPSULE, EXTENDED RELEASE ORAL EVERY 12 HOURS
Refills: 0 | Status: DISCONTINUED | OUTPATIENT
Start: 2020-11-18 | End: 2020-11-18

## 2020-11-18 RX ORDER — PIPERACILLIN AND TAZOBACTAM 4; .5 G/20ML; G/20ML
3.38 INJECTION, POWDER, LYOPHILIZED, FOR SOLUTION INTRAVENOUS EVERY 8 HOURS
Refills: 0 | Status: DISCONTINUED | OUTPATIENT
Start: 2020-11-18 | End: 2020-11-20

## 2020-11-18 RX ORDER — COLCHICINE 0.6 MG
0.6 TABLET ORAL DAILY
Refills: 0 | Status: DISCONTINUED | OUTPATIENT
Start: 2020-11-18 | End: 2020-11-20

## 2020-11-18 RX ORDER — MAGNESIUM SULFATE 500 MG/ML
2 VIAL (ML) INJECTION ONCE
Refills: 0 | Status: COMPLETED | OUTPATIENT
Start: 2020-11-18 | End: 2020-11-18

## 2020-11-18 RX ADMIN — OXYCODONE HYDROCHLORIDE 5 MILLIGRAM(S): 5 TABLET ORAL at 11:00

## 2020-11-18 RX ADMIN — PIPERACILLIN AND TAZOBACTAM 25 GRAM(S): 4; .5 INJECTION, POWDER, LYOPHILIZED, FOR SOLUTION INTRAVENOUS at 18:37

## 2020-11-18 RX ADMIN — OXYCODONE HYDROCHLORIDE 5 MILLIGRAM(S): 5 TABLET ORAL at 10:30

## 2020-11-18 RX ADMIN — CHLORHEXIDINE GLUCONATE 1 APPLICATION(S): 213 SOLUTION TOPICAL at 20:00

## 2020-11-18 RX ADMIN — PIPERACILLIN AND TAZOBACTAM 200 GRAM(S): 4; .5 INJECTION, POWDER, LYOPHILIZED, FOR SOLUTION INTRAVENOUS at 10:36

## 2020-11-18 RX ADMIN — PITAVASTATIN CALCIUM 2 MILLIGRAM(S): 1.04 TABLET, FILM COATED ORAL at 21:11

## 2020-11-18 RX ADMIN — Medication 1000 MILLIGRAM(S): at 05:44

## 2020-11-18 RX ADMIN — Medication 0.6 MILLIGRAM(S): at 11:18

## 2020-11-18 RX ADMIN — Medication 50 GRAM(S): at 00:22

## 2020-11-18 RX ADMIN — TEMAZEPAM 30 MILLIGRAM(S): 15 CAPSULE ORAL at 21:09

## 2020-11-18 RX ADMIN — Medication 2.5 MILLIGRAM(S): at 13:00

## 2020-11-18 RX ADMIN — CARVEDILOL PHOSPHATE 3.12 MILLIGRAM(S): 80 CAPSULE, EXTENDED RELEASE ORAL at 10:30

## 2020-11-18 RX ADMIN — Medication 50 MICROGRAM(S): at 05:18

## 2020-11-18 RX ADMIN — Medication 250 MILLIGRAM(S): at 06:00

## 2020-11-18 RX ADMIN — Medication 250 MILLIGRAM(S): at 17:45

## 2020-11-18 RX ADMIN — Medication 20 MILLIEQUIVALENT(S): at 06:11

## 2020-11-18 RX ADMIN — Medication 2.5 MILLIGRAM(S): at 12:35

## 2020-11-18 RX ADMIN — POLYETHYLENE GLYCOL 3350 17 GRAM(S): 17 POWDER, FOR SOLUTION ORAL at 10:31

## 2020-11-18 RX ADMIN — CARVEDILOL PHOSPHATE 6.25 MILLIGRAM(S): 80 CAPSULE, EXTENDED RELEASE ORAL at 21:08

## 2020-11-18 RX ADMIN — Medication 400 MILLIGRAM(S): at 05:17

## 2020-11-18 RX ADMIN — Medication 10 MILLIEQUIVALENT(S): at 00:21

## 2020-11-18 NOTE — PROGRESS NOTE ADULT - ATTENDING COMMENTS
I have personally seen, examined and participated in the care of this patient. I have reviewed all pertinent clinical information, including history, physical exam, plan and the resident's note. I agree with the resident's note with the following additions:    Infected pacemaker pocket s/p system explant complicated by fib/flutter with RVR and hypertension  HRs 40s-50s, MAPs 70s - restart outpatient Coreg  O2 sats mid to high 90s on room air  Regular diet  Normal renal function  H/H normal, SCDs for DVT   Afebrile, Clindamycin/Levofloxacin -> Vancomycin and Zosyn (11/17- ), cultures negative  Sugars controlled  No central access I have personally seen, examined and participated in the care of this patient. I have reviewed all pertinent clinical information, including history, physical exam, plan and the resident's note. I agree with the resident's note with the following additions:    Infected pacemaker pocket s/p system explant complicated by fib/flutter with RVR and hypertension  HRs 40s-50s, MAPs 70s - restart outpatient Coreg  O2 sats mid to high 90s on room air  Regular diet  Normal renal function  H/H normal, SCDs for DVT   Afebrile, Clindamycin/Levofloxacin -> Vancomycin and Zosyn (11/17- ), cultures negative, wound vac in place  Sugars controlled  No central access I have personally seen, examined and participated in the care of this patient. I have reviewed all pertinent clinical information, including history, physical exam, plan and the resident's note. I agree with the resident's note with the following additions:    Infected pacemaker pocket s/p system explant complicated by fib/flutter with RVR and hypertension  HRs 40s-50s, MAPs 70s - restart outpatient Coreg  O2 sats mid to high 90s on room air  Regular diet  Normal renal function  H/H normal, SCDs for DVT   Afebrile, Clindamycin/Levofloxacin as outpatient, now on Vancomycin and Zosyn (11/17- ); cultures negative, wound vac in place  Sugars controlled  No central access

## 2020-11-18 NOTE — CHART NOTE - NSCHARTNOTEFT_GEN_A_CORE
Padua Prediction Score for VTE Risk within 24hours of admission:    Active malignancy:                                                    [  ] YES +3, [ X ] NO   Previous VTE (Excluding Superficial Vein Thrombosis): [  ] YES +3, [ X ] NO  Reduced mobility:                                                     [ X ] YES +3, [  ] NO  Already known thrombophilic condition:                     [  ] YES +3, [ X ] NO  Recent (</=1 month trauma and/or surgery):             [ X ] YES +2, [  ] NO  Elderly age (>/=70):                                                  [ X ] YES +1, [  ] NO  Heart and/or Respiratory Failure:                               [  ] YES +1, [ X ] NO   Acute MI and/or ischemic CVA:                                  [  ] YES +1, [ X ] NO   Acute infection and/or rheumatologic disorder:          [  ] YES +1, [ X ] NO   BMI>/= 30:                                                              [ X ] YES +1, [  ] NO   Ongoing hormonal treatment:                                   [  ] YES +1, [ X ] NO    Total Score: [ 6 ]  points    [  ] Padua Score <  3: Low Risk of VTE         - Chemical Thromboprophylaxis should be considered on case-by-case basis  [ X ] Padua Score >/= 4: High Risk of VTE         - Chemical Thromboprophylaxis is recommended for nonpregnant patients without contraindications (Major bleeding, thrombocytopenia) who are >/=  18 years of age                         VTE Prophylaxis Recommendations:  Mechanical Pneumatic Compression Devices                                [ X ]  Yes,  [  ] No, Contraindicated    Chemical VTE Prophylaxis (Heparin/ Lovenox/ Fondaparinux)        [   ] Yes,  [  ] No              [X ] Contraindicated, because s/p ICD extraction               [ ] Already receiving Systemic Anticoagulation

## 2020-11-18 NOTE — PROGRESS NOTE ADULT - SUBJECTIVE AND OBJECTIVE BOX
CIRILO ROLDAN  MRN-[id number]  Patient is a 75y old  Female who presents with a chief complaint of pacemaker site infection (18 Nov 2020 08:42)    HPI:  75y F PMHx HTN, HLD, hypothyroid, afib (on eliquis), 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 She had a R sided dual chamber PPM placed 2.5 years ago by a Dr. Moore for sinus bradycardia to 29, otherwise asymptomatic. She was without complications until 10/30 when her cardiology office [facility] received a transmission from her PPM that showed "something wrong". She was advised to get the PPM evaluated and saw a Dr. Kent who noted one of the wires was loose. On11/5, he elected to put in a new wire and leave the loose wire in. However, a few days after that procedure the pt started noticing purulent discharge from the pacemaker incision site and presented to [facility]. She was prescribed a 3 day course of clindamycin but the purulent drainage did not resolved. She then called went back to [place] and has since completed 5 out of 20 day course of levofloxacin. On 11/14 she noticed acute sharp L 1st toe foot pain. denies trauma, bleeding, or discharge. Pt reports no hx of gout or pseudogout. She called Dr. Moore's office who recommended she come to [facility] for PPM removal and further evaluation. Pt still reports drainage from the ppm incision site.    Of note, pt was advised to stop taking her eliquis and diltiazem on 11/13 as she may get a procedure so she has not taken these medications since 11/13 pm.     ED course:   afebrile, HR 68, /102, RR 18, 97% on RA  Received CXR (16 Nov 2020 23:02)      Hospital Course:  11/17 PPM removed  11/18 Transferred to CCU for monitoring and treatment of Aflutter    24 HOUR EVENTS:    REVIEW OF SYSTEMS:   Constitutional: No weakness, fevers, or chills  Eyes/ENT: No visual changes  Respiratory: No cough, wheezing, hemoptysis  Cardiovascular: No chest pain, no palpitations  Gastrointestinal: No abdominal pain. No nausea, vomiting, hematemesis.   Genitourinary: No dysuria  Neurological: No numbness, no weakness  Skin: No itching, rashes    ICU Vital Signs Last 24 Hrs  T(C): 37.5 (18 Nov 2020 19:00), Max: 37.5 (18 Nov 2020 19:00)  T(F): 99.5 (18 Nov 2020 19:00), Max: 99.5 (18 Nov 2020 19:00)  HR: 148 (18 Nov 2020 19:15) (52 - 148)  BP: 103/83 (18 Nov 2020 19:15) (98/53 - 190/107)  BP(mean): 91 (18 Nov 2020 19:15) (68 - 145)  ABP: 108/82 (18 Nov 2020 06:00) (108/82 - 207/111)  ABP(mean): 92 (18 Nov 2020 06:00) (86 - 143)  RR: 20 (18 Nov 2020 19:15) (16 - 20)  SpO2: 96% (18 Nov 2020 19:15) (92% - 100%)      I&O's Summary    17 Nov 2020 07:01  -  18 Nov 2020 07:00  --------------------------------------------------------  IN: 410 mL / OUT: 300 mL / NET: 110 mL    18 Nov 2020 07:01  -  18 Nov 2020 19:32  --------------------------------------------------------  IN: 1000 mL / OUT: 0 mL / NET: 1000 mL      PHYSICAL EXAM:   General: No acute distress  Eyes: EOMI, PERRLA, conjunctiva and sclera clear  Chest/Lung: CTAB, no wheezes, rales, or rhonchi  Heart: Regular rate, regular rhythm. Normal S1/S2. No murmurs, rubs, or gallops.  Abdomen: Soft, nontender, nondistended. Normal bowel sounds.  Extremites: 2+ peripheral pulses B/L. No clubbing, cyanosis, or edema.  Neurology: A&O x3, no focal deficits  Skin: Incision on upper right chest with wound vac. Appears clean and dry. Mildly tender to palpation.     ============================I/O===========================   I&O's Detail    17 Nov 2020 07:01  -  18 Nov 2020 07:00  --------------------------------------------------------  IN:    IV PiggyBack: 50 mL    Oral Fluid: 360 mL  Total IN: 410 mL    OUT:    VAC (Vacuum Assisted Closure) System (mL): 0 mL    Voided (mL): 300 mL  Total OUT: 300 mL    Total NET: 110 mL      18 Nov 2020 07:01  -  18 Nov 2020 19:32  --------------------------------------------------------  IN:    IV PiggyBack: 400 mL    Oral Fluid: 600 mL  Total IN: 1000 mL    OUT:    VAC (Vacuum Assisted Closure) System (mL): 0 mL  Total OUT: 0 mL    Total NET: 1000 mL      ============================ LABS =========================                        13.3   10.89 )-----------( 306      ( 18 Nov 2020 04:52 )             41.6     11-18    143  |  103  |  18  ----------------------------<  127<H>  3.8   |  26  |  0.95    Ca    9.1      18 Nov 2020 04:52  Phos  4.3     11-18  Mg     2.3     11-18    TPro  6.0  /  Alb  3.6  /  TBili  0.7  /  DBili  x   /  AST  16  /  ALT  13  /  AlkPhos  68  11-18      LIVER FUNCTIONS - ( 18 Nov 2020 04:52 )  Alb: 3.6 g/dL / Pro: 6.0 g/dL / ALK PHOS: 68 U/L / ALT: 13 U/L / AST: 16 U/L / GGT: x           ======================Micro/Rad/Cardio=================  Telemetry: Reviewed   EKG: Reviewed  CXR: Reviewed  Culture: Reviewed   Cath: Reviewed  ======================================================  PAST MEDICAL & SURGICAL HISTORY:  Hypothyroid    Afib    HLD (hyperlipidemia)    HTN (hypertension)    History of appendectomy    H/O cardiac pacemaker      ====================ASSESSMENT ==============  S/p PPM removal 11/17  Atrial Flutter   Hypothyroidism  Pocket infection  Acute gout flare    Plan:  ====================== NEUROLOGY=====================  A&Ox3, no active neuro issues  - Continue to assess and monitor status as per protocol  - Sleep regimen with Temazepam 30mg QHS prn     ==================== RESPIRATORY======================  Comfortable on room air, SpO2 > 97%  - Continue to monitor SpO2 via pulse oximetry  - Encourage bedside spirometry    ====================CARDIOVASCULAR==================  Atrial flutter w/ conversion to NSR with 2nd degree AV block   - History of sinus bradycardia to 29 s/p ppm now removed  - S/p metoprolol tartrate 25 mg PO in PACU.   - C/w Coreg 6.25mg q12h blood pressure support   - Hold anticoagulation, plan for EP procedure friday     ===================HEMATOLOGIC/ONC ===================  H/H 13.3/41.6  - Continue to monitor H&H, PLTs.   - Hold AC for procedure Friday    ===================== RENAL =========================  No active issues  - SCr 0.95 today  - Continue to monitor I/Os, BUN/Cr, and urine output.   - Monitor and replete electrolytes prn, keeping K > 4 and Mg >2.    ==================== GASTROINTESTINAL===================  Tolerating regular diet  - Continue bowel regimen with Miralax and Senna    =======================    ENDOCRINE  =====================  No active issues  - Continue to monitor blood glucose levels    Hypothyroidism  - C/w Synthroid 50mcg     pitavastatin 2 milliGRAM(s) Oral daily    ========================INFECTIOUS DISEASE================  Pocket infection  - Continue with IV Vanco 1g q12h, IV Zosyn 3.375g q8h, with daily vanco troughs  - F/u cultures  - BCx 11/17: NGTD  - Temp 99.5F, WBC within normal limits at 10.89  - Continue to trend fever curve & WBC    ===================RHEUMATOLOGIC ===================  Acute gout flare  - C/w colchicine 0.6 mg daily until 2-3 days after flare resolves  - Avoid NSAIDS ISO recent procedure       Patient requires continuous monitoring with bedside rhythm monitoring, pulse ox monitoring, and intermittent blood gas analysis. Care plan discussed with ICU care team. Patient remained critical and at risk for life threatening decompensation.  Patient seen, examined and plan discussed with CCU team during rounds.     I have personally provided 35 minutes of critical care time excluding time spent on separate procedures.    By signing my name below, I, Massimo Padilla, attest that this documentation has been prepared under the direction and in the presence of Mandy Johnson NP   Electronically signed: Giovani Puente, 11-18-20 @ 19:32    I, Mandy Johnson NP, personally performed the services described in this documentation. All medical record entries made by the jorge luisibbraydon were at my direction and in my presence. 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 She had a R sided dual chamber PPM placed 2.5 years ago by a Dr. Moore for sinus bradycardia to 29, otherwise asymptomatic. She was without complications until 10/30 when her cardiology office [facility] received a transmission from her PPM that showed "something wrong". She was advised to get the PPM evaluated and saw a Dr. Kent who noted one of the wires was loose. On11/5, he elected to put in a new wire and leave the loose wire in. However, a few days after that procedure the pt started noticing purulent discharge from the pacemaker incision site and presented to [facility]. She was prescribed a 3 day course of clindamycin but the purulent drainage did not resolved. She then called went back to [place] and has since completed 5 out of 20 day course of levofloxacin. On 11/14 she noticed acute sharp L 1st toe foot pain. denies trauma, bleeding, or discharge. Pt reports no hx of gout or pseudogout. She called Dr. Moore's office who recommended she come to [facility] for PPM removal and further evaluation. Pt still reports drainage from the ppm incision site.    Of note, pt was advised to stop taking her eliquis and diltiazem on 11/13 as she may get a procedure so she has not taken these medications since 11/13 pm.     ED course:   afebrile, HR 68, /102, RR 18, 97% on RA  Received CXR (16 Nov 2020 23:02)      Hospital Course:  11/17 PPM removed  11/18 Transferred to CCU for monitoring and treatment of Aflutter    24 HOUR EVENTS:  - started on coreg 3.125 uptitrated to 6.25mg BID    REVIEW OF SYSTEMS:   Constitutional: No weakness, fevers, or chills  Eyes/ENT: No visual changes  Respiratory: No cough, wheezing, hemoptysis  Cardiovascular: No chest pain, no palpitations  Gastrointestinal: No abdominal pain. No nausea, vomiting, hematemesis.   Genitourinary: No dysuria  Neurological: No numbness, no weakness  Skin: No itching, rashes    ICU Vital Signs Last 24 Hrs  T(C): 37.5 (18 Nov 2020 19:00), Max: 37.5 (18 Nov 2020 19:00)  T(F): 99.5 (18 Nov 2020 19:00), Max: 99.5 (18 Nov 2020 19:00)  HR: 148 (18 Nov 2020 19:15) (52 - 148)  BP: 103/83 (18 Nov 2020 19:15) (98/53 - 190/107)  BP(mean): 91 (18 Nov 2020 19:15) (68 - 145)  ABP: 108/82 (18 Nov 2020 06:00) (108/82 - 207/111)  ABP(mean): 92 (18 Nov 2020 06:00) (86 - 143)  RR: 20 (18 Nov 2020 19:15) (16 - 20)  SpO2: 96% (18 Nov 2020 19:15) (92% - 100%)      I&O's Summary    17 Nov 2020 07:01  -  18 Nov 2020 07:00  --------------------------------------------------------  IN: 410 mL / OUT: 300 mL / NET: 110 mL    18 Nov 2020 07:01  -  18 Nov 2020 19:32  --------------------------------------------------------  IN: 1000 mL / OUT: 0 mL / NET: 1000 mL      PHYSICAL EXAM:   General: No acute distress  Eyes: EOMI, PERRLA, conjunctiva and sclera clear  Chest/Lung: CTAB, no wheezes, rales, or rhonchi  Heart: Regular rate, regular rhythm. Normal S1/S2. No murmurs, rubs, or gallops.  Abdomen: Soft, nontender, nondistended. Normal bowel sounds.  Extremites: 2+ peripheral pulses B/L. No clubbing, cyanosis, or edema.  Neurology: A&O x3, no focal deficits  Skin: Incision on upper right chest with wound vac. Appears clean and dry. Mildly tender to palpation.     ============================I/O===========================   I&O's Detail    17 Nov 2020 07:01  -  18 Nov 2020 07:00  --------------------------------------------------------  IN:    IV PiggyBack: 50 mL    Oral Fluid: 360 mL  Total IN: 410 mL    OUT:    VAC (Vacuum Assisted Closure) System (mL): 0 mL    Voided (mL): 300 mL  Total OUT: 300 mL    Total NET: 110 mL      18 Nov 2020 07:01  -  18 Nov 2020 19:32  --------------------------------------------------------  IN:    IV PiggyBack: 400 mL    Oral Fluid: 600 mL  Total IN: 1000 mL    OUT:    VAC (Vacuum Assisted Closure) System (mL): 0 mL  Total OUT: 0 mL    Total NET: 1000 mL      ============================ LABS =========================                        13.3   10.89 )-----------( 306      ( 18 Nov 2020 04:52 )             41.6     11-18    143  |  103  |  18  ----------------------------<  127<H>  3.8   |  26  |  0.95    Ca    9.1      18 Nov 2020 04:52  Phos  4.3     11-18  Mg     2.3     11-18    TPro  6.0  /  Alb  3.6  /  TBili  0.7  /  DBili  x   /  AST  16  /  ALT  13  /  AlkPhos  68  11-18      LIVER FUNCTIONS - ( 18 Nov 2020 04:52 )  Alb: 3.6 g/dL / Pro: 6.0 g/dL / ALK PHOS: 68 U/L / ALT: 13 U/L / AST: 16 U/L / GGT: x           ======================Micro/Rad/Cardio=================  Telemetry: Reviewed   EKG: Reviewed  CXR: Reviewed  Culture: Reviewed   Cath: Reviewed  ======================================================  PAST MEDICAL & SURGICAL HISTORY:  Hypothyroid    Afib    HLD (hyperlipidemia)    HTN (hypertension)    History of appendectomy    H/O cardiac pacemaker      ====================ASSESSMENT ==============  S/p PPM removal 11/17  Atrial Flutter   Hypothyroidism  Pocket infection  Acute gout flare    Plan:  ====================== NEUROLOGY=====================  A&Ox3, no active neuro issues  - Continue to assess and monitor status as per protocol  - Sleep regimen with Temazepam 30mg QHS prn     ==================== RESPIRATORY======================  Comfortable on room air, SpO2 > 97%  - Continue to monitor SpO2 via pulse oximetry  - Encourage bedside spirometry    ====================CARDIOVASCULAR==================  Atrial flutter w/ conversion to NSR with 2nd degree AV block   - History of sinus bradycardia to 29 s/p ppm now removed, with wound vac in place   - S/p metoprolol tartrate 25 mg PO in PACU.   - C/w Coreg 6.25mg q12h for HR and BP   - Hold anticoagulation, plan for EP procedure friday     ===================HEMATOLOGIC/ONC ===================  H/H 13.3/41.6  - Continue to monitor H&H, PLTs.   - Hold AC for procedure Friday    ===================== RENAL =========================  No active issues  - SCr 0.95 today  - Continue to monitor I/Os, BUN/Cr, and urine output.   - Monitor and replete electrolytes prn, keeping K > 4 and Mg >2.    ==================== GASTROINTESTINAL===================  Tolerating regular diet  - Continue bowel regimen with Miralax and Senna    =======================    ENDOCRINE  =====================  No active issues  - Continue to monitor blood glucose levels    Hypothyroidism  - C/w Synthroid 50mcg     pitavastatin 2 milliGRAM(s) Oral daily    ========================INFECTIOUS DISEASE================  Pocket infection  - Continue with IV Vanco 1g q12h, IV Zosyn 3.375g q8h, with daily vanco troughs  - F/u cultures: BCx 11/17 and pocket cultures NGTD  - Temp 99.5F, WBC within normal limits at 10.89  - Continue to trend fever curve & WBC    ===================RHEUMATOLOGIC ===================  Acute gout flare  - C/w colchicine 0.6 mg daily until 2-3 days after flare resolves  - Avoid NSAIDS ISO recent procedure       Patient requires continuous monitoring with bedside rhythm monitoring, pulse ox monitoring, and intermittent blood gas analysis. Care plan discussed with ICU care team. Patient remained critical and at risk for life threatening decompensation.  Patient seen, examined and plan discussed with CCU team during rounds.     I have personally provided 35 minutes of critical care time excluding time spent on separate procedures.    By signing my name below, I, Massimo Padilla, attest that this documentation has been prepared under the direction and in the presence of Mandy Johnson NP   Electronically signed: Giovani Puente, 11-18-20 @ 19:32    RICCARDO, Mandy Johnson NP, personally performed the services described in this documentation. All medical record entries made by the jorge luisibe were at my direction and in my presence. I have reviewed the chart and agree that the record reflects my personal performance and is accurate and complete  Electronically signed: Mandy Johnson NP

## 2020-11-18 NOTE — PROGRESS NOTE ADULT - SUBJECTIVE AND OBJECTIVE BOX
24H hour events: Pt without any complaints, stating her left foot is much better since started on colchicine. s/p PPM system extraction last night, went into rapid Afib s/p DCCV in the OR and was in rapid atrial flutter on the unit which spontaneously converted to sinus rhythm around 12:30am.     MEDICATIONS:  vancomycin  IVPB 1000 milliGRAM(s) IV Intermittent every 12 hours  colchicine 0.6 milliGRAM(s) Oral daily  levothyroxine 50 MICROGram(s) Oral <User Schedule>  benzocaine 15 mG/menthol 3.6 mG (Sugar-Free) Lozenge 1 Lozenge Oral four times a day PRN  chlorhexidine 2% Cloths 1 Application(s) Topical at bedtime      REVIEW OF SYSTEMS:  Complete 10point ROS negative.    PHYSICAL EXAM:  T(C): 36.7 (11-18-20 @ 09:00), Max: 37.2 (11-17-20 @ 22:45)  HR: 58 (11-18-20 @ 09:00) (52 - 132)  BP: 98/53 (11-18-20 @ 09:00) (98/53 - 190/107)  RR: 18 (11-18-20 @ 09:00) (16 - 20)  SpO2: 96% (11-18-20 @ 09:00) (95% - 100%)  Wt(kg): --  I&O's Summary    17 Nov 2020 07:01  -  18 Nov 2020 07:00  --------------------------------------------------------  IN: 410 mL / OUT: 300 mL / NET: 110 mL    18 Nov 2020 07:01  -  18 Nov 2020 09:51  --------------------------------------------------------  IN: 0 mL / OUT: 0 mL / NET: 0 mL      Appearance: NAD, OOB sitting up in chair 	  HEENT: Neck supple   Cardiovascular: Normal S1 S2, No JVD, No murmurs  Respiratory: Lungs clear to auscultation	  Psychiatry: A & O x 3, Mood & affect appropriate  Gastrointestinal: Soft, Non-tender, + BS	  Skin: Right chest wall wound vac site C/D/I. Right groin surgical incision site C/D/I without hematoma   Extremities: Normal range of motion, No clubbing, cyanosis or edema  Vascular: Peripheral pulses palpable 2+ bilaterally        LABS:	 	    CBC Full  -  ( 18 Nov 2020 04:52 )  WBC Count : 10.89 K/uL  Hemoglobin : 13.3 g/dL  Hematocrit : 41.6 %  Platelet Count - Automated : 306 K/uL  Mean Cell Volume : 92.7 fl  Mean Cell Hemoglobin : 29.6 pg  Mean Cell Hemoglobin Concentration : 32.0 gm/dL  Auto Neutrophil # : x  Auto Lymphocyte # : x  Auto Monocyte # : x  Auto Eosinophil # : x  Auto Basophil # : x  Auto Neutrophil % : x  Auto Lymphocyte % : x  Auto Monocyte % : x  Auto Eosinophil % : x  Auto Basophil % : x    11-18    143  |  103  |  18  ----------------------------<  127<H>  3.8   |  26  |  0.95  11-17    142  |  101  |  17  ----------------------------<  111<H>  3.9   |  24  |  1.01    Ca    9.1      18 Nov 2020 04:52  Ca    9.1      17 Nov 2020 23:22  Phos  4.3     11-18  Phos  3.9     11-17  Mg     2.3     11-18  Mg     1.8     11-17    TPro  6.0  /  Alb  3.6  /  TBili  0.7  /  DBili  x   /  AST  16  /  ALT  13  /  AlkPhos  68  11-18  TPro  6.4  /  Alb  3.7  /  TBili  0.7  /  DBili  x   /  AST  15  /  ALT  11  /  AlkPhos  73  11-17  	    Culture - Fungal, Tissue (11.18.20 @ 00:39)    Specimen Source: .Tissue pacemaker podul    Culture Results:   Testing in progress    Culture - Tissue with Gram Stain (11.18.20 @ 00:39)    Gram Stain:   No polymorphonuclear cells seen per low power field  No organisms seen per oil power field    Specimen Source: .Tissue Other    Culture - Blood (11.17.20 @ 04:04)    Specimen Source: .Blood Blood-Peripheral    Culture Results:   No growth to date.      TELEMETRY: SB at high 40's to 50's, was in rapid AFLutter which was spontaneously converted to SR around 12:30am.

## 2020-11-18 NOTE — PROGRESS NOTE ADULT - SUBJECTIVE AND OBJECTIVE BOX
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 She had a R sided dual chamber PPM placed 2.5 years ago by a Dr. Moore for sinus bradycardia to 29, otherwise asymptomatic. She was without complications until 10/30 when her cardiology office [place] received a transmission from her PPM that showed "something wrong". She was advised to get the PPM evaluated and saw a Dr. Kent who noted one of the wires was loose. On11/5, he elected to put in a new wire and leave the loose wire in. However, a few days after that procedure the pt started noticing purulent discharge from the pacemaker incision site and presented to [facility]. She was prescribed a 3 day course of clindamycin but the purulent drainage did not resolved. She then called went back to [place] and has since completed 5 out of 20 day course of levofloxacin. On 11/14 she noticed acute sharp L 1st toe foot pain. denies trauma, bleeding, or discharge. Pt reports no hx of gout or pseudogout. She called Dr. Moore's office who recommended she come to [facility] for PPM removal and further evaluation. Pt still reports drainage from the ppm incision site.    Of note, pt was advised to stop taking her eliquis and diltiazem on 11/13 as she may get a procedure so she has not taken these medications since 11/13 pm.     ED course:   afebrile, HR 68, /102, RR 18, 97% on RA  Received CXR (16 Nov 2020 23:02)      24 HOUR EVENTS:    REVIEW OF SYSTEMS:    CONSTITUTIONAL: No weakness, fevers or chills  EYES/ENT: No visual changes;  No vertigo or throat pain   NECK: No pain or stiffness  RESPIRATORY: No cough, wheezing, hemoptysis; No shortness of breath  CARDIOVASCULAR: No chest pain or palpitations  GASTROINTESTINAL: No abdominal or epigastric pain. No nausea, vomiting, or hematemesis; No diarrhea or constipation. No melena or hematochezia.  GENITOURINARY: No dysuria, frequency or hematuria  NEUROLOGICAL: No numbness or weakness  SKIN: No itching, rashes      ICU Vital Signs Last 24 Hrs  T(C): 36.8 (18 Nov 2020 03:00), Max: 37.2 (17 Nov 2020 22:45)  T(F): 98.2 (18 Nov 2020 03:00), Max: 99 (17 Nov 2020 22:45)  HR: 52 (18 Nov 2020 07:00) (52 - 132)  BP: 124/52 (18 Nov 2020 07:00) (115/73 - 190/107)  BP(mean): 68 (18 Nov 2020 07:00) (68 - 145)  ABP: 108/82 (18 Nov 2020 06:00) (108/82 - 207/111)  ABP(mean): 92 (18 Nov 2020 06:00) (86 - 143)  RR: 19 (18 Nov 2020 07:00) (16 - 20)  SpO2: 98% (18 Nov 2020 07:00) (95% - 100%)      CVP(mm Hg): --  CO: --  CI: --  PA: --  PA(mean): --  PA(direct): --  PCWP: --  LA: --  RA: --  SVR: --  SVRI: --  PVR: --  PVRI: --  I&O's Summary    17 Nov 2020 07:01  -  18 Nov 2020 07:00  --------------------------------------------------------  IN: 410 mL / OUT: 300 mL / NET: 110 mL        CAPILLARY BLOOD GLUCOSE    CAPILLARY BLOOD GLUCOSE          PHYSICAL EXAM:  GENERAL: No acute distress, well-developed  HEAD:  Atraumatic, Normocephalic  EYES: EOMI, PERRLA, conjunctiva and sclera clear  NECK: Supple, no lymphadenopathy, no JVD  CHEST/LUNG: CTAB; No wheezes, rales, or rhonchi  HEART: Regular rate and rhythm. Normal S1/S2. No murmurs, rubs, or gallops  ABDOMEN: Soft, non-tender, non-distended; normal bowel sounds, no organomegaly  EXTREMITIES:  2+ peripheral pulses b/l, No clubbing, cyanosis, or edema  NEUROLOGY: A&O x 3, no focal deficits  SKIN: No rashes or lesions    ============================I/O===========================   I&O's Detail    17 Nov 2020 07:01  -  18 Nov 2020 07:00  --------------------------------------------------------  IN:    IV PiggyBack: 50 mL    Oral Fluid: 360 mL  Total IN: 410 mL    OUT:    VAC (Vacuum Assisted Closure) System (mL): 0 mL    Voided (mL): 300 mL  Total OUT: 300 mL    Total NET: 110 mL        ============================ LABS =========================                        13.3   10.89 )-----------( 306      ( 18 Nov 2020 04:52 )             41.6     11-18    143  |  103  |  18  ----------------------------<  127<H>  3.8   |  26  |  0.95    Ca    9.1      18 Nov 2020 04:52  Phos  4.3     11-18  Mg     2.3     11-18    TPro  6.0  /  Alb  3.6  /  TBili  0.7  /  DBili  x   /  AST  16  /  ALT  13  /  AlkPhos  68  11-18                LIVER FUNCTIONS - ( 18 Nov 2020 04:52 )  Alb: 3.6 g/dL / Pro: 6.0 g/dL / ALK PHOS: 68 U/L / ALT: 13 U/L / AST: 16 U/L / GGT: x           PT/INR - ( 16 Nov 2020 19:14 )   PT: 14.5 sec;   INR: 1.22 ratio         PTT - ( 16 Nov 2020 19:14 )  PTT:33.1 sec        ======================Micro/Rad/Cardio=================  Telemtry: Reviewed   EKG: Reviewed  CXR: Reviewed  Culture: Reviewed     ======================================================  PAST MEDICAL & SURGICAL HISTORY:  Hypothyroid    Afib    HLD (hyperlipidemia)    HTN (hypertension)    History of appendectomy    H/O cardiac pacemaker       CIRILO ROLDAN  MRN-[id number]  Patient is a 75y old  Female who presents with a chief complaint of pacemaker site infection (17 Nov 2020 21:21)    HPI:  75y F PMHx HTN, HLD, hypothyroid, afib (on eliquis), 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 She had a R sided dual chamber PPM placed 2.5 years ago by a Dr. Moore for sinus bradycardia to 29, otherwise asymptomatic. She was without complications until 10/30 when her cardiology office [place] received a transmission from her PPM that showed "something wrong". She was advised to get the PPM evaluated and saw a Dr. Kent who noted one of the wires was loose. On11/5, he elected to put in a new wire and leave the loose wire in. However, a few days after that procedure the pt started noticing purulent discharge from the pacemaker incision site and presented to [facility]. She was prescribed a 3 day course of clindamycin but the purulent drainage did not resolved. She then called went back to [place] and has since completed 5 out of 20 day course of levofloxacin. On 11/14 she noticed acute sharp L 1st toe foot pain. denies trauma, bleeding, or discharge. Pt reports no hx of gout or pseudogout. She called Dr. Moore's office who recommended she come to [facility] for PPM removal and further evaluation. Pt still reports drainage from the ppm incision site.    Of note, pt was advised to stop taking her eliquis and diltiazem on 11/13 as she may get a procedure so she has not taken these medications since 11/13 pm.     ED course:   afebrile, HR 68, /102, RR 18, 97% on RA  Received CXR (16 Nov 2020 23:02)      24 HOUR EVENTS:  -Pacemaker removed yesterday, started ib Vanc and Zosyn  -HTN and Tachy following procedure, given lopressor, metoprolol and hydralazine  -Feeling well this morning, gout pain almost all improved. No SOB, chest pain, fevers, chills. Mild pain at incision site.     REVIEW OF SYSTEMS:    CONSTITUTIONAL: No weakness, fevers or chills  EYES/ENT: No visual changes;  No vertigo or throat pain   NECK: No pain or stiffness  RESPIRATORY: No cough, wheezing, hemoptysis; No shortness of breath  CARDIOVASCULAR: No chest pain or palpitations  GASTROINTESTINAL: No abdominal or epigastric pain. No nausea, vomiting, or hematemesis; No diarrhea or constipation. No melena or hematochezia.  GENITOURINARY: No dysuria, frequency or hematuria  NEUROLOGICAL: No numbness or weakness  SKIN: No itching, rashes      ICU Vital Signs Last 24 Hrs  T(C): 36.8 (18 Nov 2020 03:00), Max: 37.2 (17 Nov 2020 22:45)  T(F): 98.2 (18 Nov 2020 03:00), Max: 99 (17 Nov 2020 22:45)  HR: 52 (18 Nov 2020 07:00) (52 - 132)  BP: 124/52 (18 Nov 2020 07:00) (115/73 - 190/107)  BP(mean): 68 (18 Nov 2020 07:00) (68 - 145)  ABP: 108/82 (18 Nov 2020 06:00) (108/82 - 207/111)  ABP(mean): 92 (18 Nov 2020 06:00) (86 - 143)  RR: 19 (18 Nov 2020 07:00) (16 - 20)  SpO2: 98% (18 Nov 2020 07:00) (95% - 100%)      17 Nov 2020 07:01  -  18 Nov 2020 07:00  --------------------------------------------------------  IN: 410 mL / OUT: 300 mL / NET: 110 mL            PHYSICAL EXAM:  GENERAL: No acute distress, well-developed  HEAD:  Atraumatic, Normocephalic  EYES: EOMI, PERRLA, conjunctiva and sclera clear  NECK: Supple, no lymphadenopathy, no JVD  CHEST/LUNG: CTAB; No wheezes, rales, or rhonchi  HEART: Regular rate and rhythm. Normal S1/S2. No murmurs, rubs, or gallops  ABDOMEN: Soft, non-tender, non-distended; normal bowel sounds, no organomegaly  EXTREMITIES:  2+ peripheral pulses b/l, No clubbing, cyanosis, or edema  NEUROLOGY: A&O x 3, no focal deficits  SKIN: Incision on upper right chest with wound vac. Appears clean and dry. Mildly tender to palpation.     ============================I/O===========================   I&O's Detail    17 Nov 2020 07:01  -  18 Nov 2020 07:00  --------------------------------------------------------  IN:    IV PiggyBack: 50 mL    Oral Fluid: 360 mL  Total IN: 410 mL    OUT:    VAC (Vacuum Assisted Closure) System (mL): 0 mL    Voided (mL): 300 mL  Total OUT: 300 mL    Total NET: 110 mL        ============================ LABS =========================                        13.3   10.89 )-----------( 306      ( 18 Nov 2020 04:52 )             41.6     11-18    143  |  103  |  18  ----------------------------<  127<H>  3.8   |  26  |  0.95    Ca    9.1      18 Nov 2020 04:52  Phos  4.3     11-18  Mg     2.3     11-18    TPro  6.0  /  Alb  3.6  /  TBili  0.7  /  DBili  x   /  AST  16  /  ALT  13  /  AlkPhos  68  11-18                LIVER FUNCTIONS - ( 18 Nov 2020 04:52 )  Alb: 3.6 g/dL / Pro: 6.0 g/dL / ALK PHOS: 68 U/L / ALT: 13 U/L / AST: 16 U/L / GGT: x           PT/INR - ( 16 Nov 2020 19:14 )   PT: 14.5 sec;   INR: 1.22 ratio         PTT - ( 16 Nov 2020 19:14 )  PTT:33.1 sec        ======================Micro/Rad/Cardio=================  Telemtry: Reviewed   EKG: Reviewed  CXR: Reviewed  Culture: Reviewed     ======================================================  PAST MEDICAL & SURGICAL HISTORY:  Hypothyroid    Afib    HLD (hyperlipidemia)    HTN (hypertension)    History of appendectomy    H/O cardiac pacemaker

## 2020-11-18 NOTE — PROGRESS NOTE ADULT - ASSESSMENT
75 year old female with PMHx HTN, HLD, hypothyroid, PAF (on xarelto), SSS, s/p ILR, s/p right sided PPM with capped Biotronik RA/RV leads from 2009, new RA lead 2014, generator change (STJ) 2018 and RV lead revision 11/5/20 p/w pacemaker pocket infection. Now s/p PPM system extraction 11/17 with rapid Afib s/p DCCV in the OR and rapid Aflutter on the unit which spontaneously converted to sinus rhythm around 12:30am.     1. PPM pocket infection s/p extraction   2. SSS s/p PPM  3. pAfib/pAFL  4. Left foot gout, started on colchicine     --Need Plastic and Wound vac consult  --ID consult appreciated, on vanco  --f/u wound and blood culture  --Possible PPM reimplant on Friday (11/20)  --Continue to hold xarelto given short duration of Afib/AFL  --May restart coreg 3.125mg Q12hr  --Continue to hold digoxin and diltiazem   --Plan discussed with CICU [place]  [id number]

## 2020-11-18 NOTE — PROGRESS NOTE ADULT - ASSESSMENT
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 Moore for sinus bradycardia to 29, otherwise asymptomatic  She was advised to get the PPM evaluated and saw a Dr. Kent who noted one of the wires was loose  On 11/5, he elected to put in a new wire and leave the loose wire in  Patient with discharge from site and pain  No fevers, no leukocytosis  CXR clear, ppm in place, reviewed personally  Given discharge from site, high concern for pocket infection--appreciate EP procedure, removal of PPM  Overall,  1) Suspected PPM infection  - Discharge from site in setting of recent device manipulation; now s/p explant PPM  - Continue Vanco 1g q 12 (monitor levels)  - Zosyn 3.375g q 8  - F/U pending cultures/explant culture  - Wound care per EP  2) L 1st toe pain (gout?)  - Further care per primary team    Will finalize antibiotic plan based on pending cultures    aHrvinder Gamez MD  Pager [phone]  After 5pm and on weekends call [phone]

## 2020-11-18 NOTE — CHART NOTE - NSCHARTNOTEFT_GEN_A_CORE
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 She had a R sided dual chamber PPM placed 2.5 years ago by a Dr. Moore for sinus bradycardia to 29, otherwise asymptomatic. She was without complications until 10/30 when her cardiology office [place] received a transmission from her PPM that showed "something wrong". She was advised to get the PPM evaluated and saw a Dr. Kent who noted one of the wires was loose. On11/5, he elected to put in a new wire and leave the loose wire in. However, a few days after that procedure the pt started noticing purulent discharge from the pacemaker incision site and presented to [facility]. She was prescribed a 3 day course of clindamycin but the purulent drainage did not resolved. She then called went back to [place] and has since completed 5 out of 20 day course of levofloxacin. On 11/14 she noticed acute sharp L 1st toe foot pain. denies trauma, bleeding, or discharge. Pt reports no hx of gout or pseudogout. She called Dr. Moore's office who recommended she come to [facility] for PPM removal and further evaluation. Pt still reports drainage from the ppm incision site.  Of note, pt was advised to stop taking her eliquis and diltiazem on 11/13 as she may get a procedure so she has not taken these medications since 11/13 pm.     Hospital course:   Patient was started on vancomycin and zosyn and planned for removal on 11/17. She was treated for acute gout flare in her L right great toe. The pacemaker pocket was opened, all leads freed and anchors removed. Following the procedure, developed atrial fibrillation and underwent DC CV. In the PACU, she had HR to 180 and BP to 220s. She was given lopressor 5 mg IV and metoprolol tartrate 25 mg once. She was transferred to CICU for monitoring and treatment of aflutter. Presently she complains of chest pain at the site of her pocket wound and a sore throat following extubation. Otherwise, she has no acute complaints. Her right groin is without pain. She has left toe pain.  She continued treatment with vanc and zosyn. Her rate was controlled with metoprolol and coreg. She received tylenol and oxycodone for pain.       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 Resumed coreg today 11/18.   -Hold anticoagulation, plan for EP procedure friday     ====================== NEUROLOGY=====================  No active issues    ==================== RESPIRATORY======================  No active issue  -on room air     ===================== RENAL =========================  No active issues  -Cr 1.07, continue to trend    ==================== GASTROINTESTINAL===================    Diet: Regular diet  -senna and miralax for bowel regimen ISO opioid use     ========================INFECTIOUS DISEASE================  Pacemaker pocket infection  -C/w vancomycin 1 g BID, Zosyn Q8 (11/17 AM - )  -Vancomycin trough before 4th dose (17:00 11/18)  -F/u blood cultures (11/17 culture NGTD)  -C/w wound vac  -Apprec ID recs  -spoke to Plastics- no intervention needed at this time for wound, continue abx and wound vac    ===================HEMATOLOGIC/ONC ===================  -Holding AC for procedure Friday     ===================RHEUMATOLOGIC ===================  Acute gout flare  -feeling significantly better  -C/w colchicine 0.6 mg daily until 2-3 days after flare resolves  -avoid NSAIDS ISO recent procedure     =======================    ENDOCRINE  =====================  A1c: 5/8  -glucose WNL     Hypothyroidism  -F/u TSH  -C/w home regimen of levothyroxine alternating 50/75 mcg every other day.    Attending Attestation:   I have personally seen, examined and participated in the care of this patient. I have reviewed all pertinent clinical information, including history, physical exam, plan and the resident's note. I agree with the resident's note with the following additions:    Infected pacemaker pocket s/p system explant complicated by fib/flutter with RVR and hypertension  HRs 40s-50s, MAPs 70s - restart outpatient Coreg  O2 sats mid to high 90s on room air  Regular diet  Normal renal function  H/H normal, SCDs for DVT   Afebrile, Clindamycin/Levofloxacin -> Vancomycin and Zosyn (11/17- ), cultures negative, wound vac in place  Sugars controlled  No central access. 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 She had a R sided dual chamber PPM placed 2.5 years ago by a Dr. Moore for sinus bradycardia to 29, otherwise asymptomatic. She was without complications until 10/30 when her cardiology office [place] received a transmission from her PPM that showed "something wrong". She was advised to get the PPM evaluated and saw a Dr. Kent who noted one of the wires was loose. On11/5, he elected to put in a new wire and leave the loose wire in. However, a few days after that procedure the pt started noticing purulent discharge from the pacemaker incision site and presented to [facility]. She was prescribed a 3 day course of clindamycin but the purulent drainage did not resolved. She then called went back to [place] and has since completed 5 out of 20 day course of levofloxacin. On 11/14 she noticed acute sharp L 1st toe foot pain. denies trauma, bleeding, or discharge. Pt reports no hx of gout or pseudogout. She called Dr. Moore's office who recommended she come to [facility] for PPM removal and further evaluation. Pt still reports drainage from the ppm incision site.  Of note, pt was advised to stop taking her eliquis and diltiazem on 11/13 as she may get a procedure so she has not taken these medications since 11/13 pm.     Hospital course:   Patient was started on vancomycin and zosyn and planned for removal on 11/17. She was treated for acute gout flare in her L right great toe. The pacemaker pocket was opened, all leads freed and anchors removed. Following the procedure, developed atrial fibrillation and underwent DC CV. In the PACU, she had HR to 180 and BP to 220s. She was given lopressor 5 mg IV and metoprolol tartrate 25 mg once. She was transferred to CICU for monitoring and treatment of aflutter. Presently she complains of chest pain at the site of her pocket wound and a sore throat following extubation. Otherwise, she has no acute complaints. Her right groin is without pain. She has left toe pain.  She continued treatment with vanc and zosyn. Her rate was controlled with metoprolol and coreg. She received tylenol and oxycodone for pain.       REVIEW OF SYSTEMS:    CONSTITUTIONAL: No weakness, fevers or chills  EYES/ENT: No visual changes;  No vertigo or throat pain   NECK: No pain or stiffness  RESPIRATORY: No cough, wheezing, hemoptysis; No shortness of breath  CARDIOVASCULAR: No chest pain or palpitations  GASTROINTESTINAL: No abdominal or epigastric pain. No nausea, vomiting, or hematemesis; No diarrhea or constipation. No melena or hematochezia.  GENITOURINARY: No dysuria, frequency or hematuria  NEUROLOGICAL: No numbness or weakness  SKIN: No itching, rashes      ICU Vital Signs Last 24 Hrs  T(C): 36.8 (18 Nov 2020 03:00), Max: 37.2 (17 Nov 2020 22:45)  T(F): 98.2 (18 Nov 2020 03:00), Max: 99 (17 Nov 2020 22:45)  HR: 52 (18 Nov 2020 07:00) (52 - 132)  BP: 124/52 (18 Nov 2020 07:00) (115/73 - 190/107)  BP(mean): 68 (18 Nov 2020 07:00) (68 - 145)  ABP: 108/82 (18 Nov 2020 06:00) (108/82 - 207/111)  ABP(mean): 92 (18 Nov 2020 06:00) (86 - 143)  RR: 19 (18 Nov 2020 07:00) (16 - 20)  SpO2: 98% (18 Nov 2020 07:00) (95% - 100%)      17 Nov 2020 07:01  -  18 Nov 2020 07:00  --------------------------------------------------------  IN: 410 mL / OUT: 300 mL / NET: 110 mL            PHYSICAL EXAM:  GENERAL: No acute distress, well-developed  HEAD:  Atraumatic, Normocephalic  EYES: EOMI, PERRLA, conjunctiva and sclera clear  NECK: Supple, no lymphadenopathy, no JVD  CHEST/LUNG: CTAB; No wheezes, rales, or rhonchi  HEART: Regular rate and rhythm. Normal S1/S2. No murmurs, rubs, or gallops  ABDOMEN: Soft, non-tender, non-distended; normal bowel sounds, no organomegaly  EXTREMITIES:  2+ peripheral pulses b/l, No clubbing, cyanosis, or edema  NEUROLOGY: A&O x 3, no focal deficits  SKIN: Incision on upper right chest with wound vac. Appears clean and dry. Mildly tender to palpation.     ============================I/O===========================   I&O's Detail    17 Nov 2020 07:01  -  18 Nov 2020 07:00  --------------------------------------------------------  IN:    IV PiggyBack: 50 mL    Oral Fluid: 360 mL  Total IN: 410 mL    OUT:    VAC (Vacuum Assisted Closure) System (mL): 0 mL    Voided (mL): 300 mL  Total OUT: 300 mL    Total NET: 110 mL        ============================ LABS =========================                        13.3   10.89 )-----------( 306      ( 18 Nov 2020 04:52 )             41.6     11-18    143  |  103  |  18  ----------------------------<  127<H>  3.8   |  26  |  0.95    Ca    9.1      18 Nov 2020 04:52  Phos  4.3     11-18  Mg     2.3     11-18    TPro  6.0  /  Alb  3.6  /  TBili  0.7  /  DBili  x   /  AST  16  /  ALT  13  /  AlkPhos  68  11-18                LIVER FUNCTIONS - ( 18 Nov 2020 04:52 )  Alb: 3.6 g/dL / Pro: 6.0 g/dL / ALK PHOS: 68 U/L / ALT: 13 U/L / AST: 16 U/L / GGT: x           PT/INR - ( 16 Nov 2020 19:14 )   PT: 14.5 sec;   INR: 1.22 ratio         PTT - ( 16 Nov 2020 19:14 )  PTT:33.1 sec        Assessment	  75y F PMHx HTN, HLD, hypothyroid, afib (on eliquis), 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 Resumed coreg today 11/18.   -Hold anticoagulation, plan for EP procedure friday     ====================== NEUROLOGY=====================  No active issues    ==================== RESPIRATORY======================  No active issue  -on room air     ===================== RENAL =========================  No active issues  -Cr 1.07, continue to trend    ==================== GASTROINTESTINAL===================    Diet: Regular diet  -senna and miralax for bowel regimen ISO opioid use     ========================INFECTIOUS DISEASE================  Pacemaker pocket infection  -C/w vancomycin 1 g BID, Zosyn Q8 (11/17 AM - )  -Vancomycin trough before 4th dose (17:00 11/18)  -F/u blood cultures (11/17 culture NGTD)  -C/w wound vac  -Apprec ID recs  -spoke to Plastics- no intervention needed at this time for wound, continue abx and wound vac    ===================HEMATOLOGIC/ONC ===================  -Holding AC for procedure Friday     ===================RHEUMATOLOGIC ===================  Acute gout flare  -feeling significantly better  -C/w colchicine 0.6 mg daily until 2-3 days after flare resolves  -avoid NSAIDS ISO recent procedure     =======================    ENDOCRINE  =====================  A1c: 5/8  -glucose WNL     Hypothyroidism  -F/u TSH  -C/w home regimen of levothyroxine alternating 50/75 mcg every other day.    Attending Attestation:   I have personally seen, examined and participated in the care of this patient. I have reviewed all pertinent clinical information, including history, physical exam, plan and the resident's note. I agree with the resident's note with the following additions:    Infected pacemaker pocket s/p system explant complicated by fib/flutter with RVR and hypertension  HRs 40s-50s, MAPs 70s - restart outpatient Coreg  O2 sats mid to high 90s on room air  Regular diet  Normal renal function  H/H normal, SCDs for DVT   Afebrile, Clindamycin/Levofloxacin -> Vancomycin and Zosyn (11/17- ), cultures negative, wound vac in place  Sugars controlled  No central access. 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 She had a R sided dual chamber PPM placed 2.5 years ago by a Dr. Moore for sinus bradycardia to 29, otherwise asymptomatic. She was without complications until 10/30 when her cardiology office [place] received a transmission from her PPM that showed "something wrong". She was advised to get the PPM evaluated and saw a Dr. Kent who noted one of the wires was loose. On11/5, he elected to put in a new wire and leave the loose wire in. However, a few days after that procedure the pt started noticing purulent discharge from the pacemaker incision site and presented to [facility]. She was prescribed a 3 day course of clindamycin but the purulent drainage did not resolved. She then called went back to [facility] and has since completed 5 out of 20 day course of levofloxacin. On 11/14 she noticed acute sharp L 1st toe foot pain. denies trauma, bleeding, or discharge. Pt reports no hx of gout or pseudogout. She called Dr. Moore's office who recommended she come to [facility] for PPM removal and further evaluation. Pt still reports drainage from the ppm incision site.  Of note, pt was advised to stop taking her eliquis and diltiazem on 11/13 as she may get a procedure so she has not taken these medications since 11/13 pm.     Hospital course:   Patient was started on vancomycin and zosyn and planned for removal on 11/17. She was treated for acute gout flare in her L right great toe. The pacemaker pocket was opened, all leads freed and anchors removed. Following the procedure, developed atrial fibrillation and underwent DC CV. In the PACU, she had HR to 180 and BP to 220s. She was given lopressor 5 mg IV and metoprolol tartrate 25 mg once. She was transferred to CICU for monitoring and treatment of aflutter. Presently she complains of chest pain at the site of her pocket wound and a sore throat following extubation. Otherwise, she has no acute complaints. Her right groin is without pain. She has left toe pain.  She continued treatment with vanc and zosyn. Her rate was controlled with metoprolol and coreg. She received tylenol and oxycodone for pain.     Per EP recs, her coreg was discontinued and started on sotalol, as per EP. She developed an HADLEY, and her vanc and zosyn was discontinued.     For Follow up:  [ ] f/u ID recs for abx for her pacemaker infection  [ ] f/u EP recs for accurate dosing for sotalol  [ ] trend creatinine, f/u urine studies.    REVIEW OF SYSTEMS:    CONSTITUTIONAL: No weakness, fevers or chills  EYES/ENT: No visual changes;  No vertigo or throat pain   NECK: No pain or stiffness  RESPIRATORY: No cough, wheezing, hemoptysis; No shortness of breath  CARDIOVASCULAR: No chest pain or palpitations  GASTROINTESTINAL: No abdominal or epigastric pain. No nausea, vomiting, or hematemesis; No diarrhea or constipation. No melena or hematochezia.  GENITOURINARY: No dysuria, frequency or hematuria  NEUROLOGICAL: No numbness or weakness  SKIN: No itching, rashes      ICU Vital Signs Last 24 Hrs  T(C): 36.8 (18 Nov 2020 03:00), Max: 37.2 (17 Nov 2020 22:45)  T(F): 98.2 (18 Nov 2020 03:00), Max: 99 (17 Nov 2020 22:45)  HR: 52 (18 Nov 2020 07:00) (52 - 132)  BP: 124/52 (18 Nov 2020 07:00) (115/73 - 190/107)  BP(mean): 68 (18 Nov 2020 07:00) (68 - 145)  ABP: 108/82 (18 Nov 2020 06:00) (108/82 - 207/111)  ABP(mean): 92 (18 Nov 2020 06:00) (86 - 143)  RR: 19 (18 Nov 2020 07:00) (16 - 20)  SpO2: 98% (18 Nov 2020 07:00) (95% - 100%)      17 Nov 2020 07:01  -  18 Nov 2020 07:00  --------------------------------------------------------  IN: 410 mL / OUT: 300 mL / NET: 110 mL            PHYSICAL EXAM:  GENERAL: No acute distress, well-developed  HEAD:  Atraumatic, Normocephalic  EYES: EOMI, PERRLA, conjunctiva and sclera clear  NECK: Supple, no lymphadenopathy, no JVD  CHEST/LUNG: CTAB; No wheezes, rales, or rhonchi  HEART: Regular rate and rhythm. Normal S1/S2. No murmurs, rubs, or gallops  ABDOMEN: Soft, non-tender, non-distended; normal bowel sounds, no organomegaly  EXTREMITIES:  2+ peripheral pulses b/l, No clubbing, cyanosis, or edema  NEUROLOGY: A&O x 3, no focal deficits  SKIN: Incision on upper right chest with wound vac. Appears clean and dry. Mildly tender to palpation.     ============================I/O===========================   I&O's Detail    17 Nov 2020 07:01  -  18 Nov 2020 07:00  --------------------------------------------------------  IN:    IV PiggyBack: 50 mL    Oral Fluid: 360 mL  Total IN: 410 mL    OUT:    VAC (Vacuum Assisted Closure) System (mL): 0 mL    Voided (mL): 300 mL  Total OUT: 300 mL    Total NET: 110 mL        ============================ LABS =========================                        13.3   10.89 )-----------( 306      ( 18 Nov 2020 04:52 )             41.6     11-18    143  |  103  |  18  ----------------------------<  127<H>  3.8   |  26  |  0.95    Ca    9.1      18 Nov 2020 04:52  Phos  4.3     11-18  Mg     2.3     11-18    TPro  6.0  /  Alb  3.6  /  TBili  0.7  /  DBili  x   /  AST  16  /  ALT  13  /  AlkPhos  68  11-18                LIVER FUNCTIONS - ( 18 Nov 2020 04:52 )  Alb: 3.6 g/dL / Pro: 6.0 g/dL / ALK PHOS: 68 U/L / ALT: 13 U/L / AST: 16 U/L / GGT: x           PT/INR - ( 16 Nov 2020 19:14 )   PT: 14.5 sec;   INR: 1.22 ratio         PTT - ( 16 Nov 2020 19:14 )  PTT:33.1 sec        Assessment	  75y F PMHx HTN, HLD, hypothyroid, afib (on eliquis), 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 Resumed coreg today 11/18.   -Hold anticoagulation, plan for EP procedure friday     ====================== NEUROLOGY=====================  No active issues    ==================== RESPIRATORY======================  No active issue  -on room air     ===================== RENAL =========================  No active issues  -Cr 1.07, continue to trend    ==================== GASTROINTESTINAL===================    Diet: Regular diet  -senna and miralax for bowel regimen ISO opioid use     ========================INFECTIOUS DISEASE================  Pacemaker pocket infection  -C/w vancomycin 1 g BID, Zosyn Q8 (11/17 AM - )  -Vancomycin trough before 4th dose (17:00 11/18)  -F/u blood cultures (11/17 culture NGTD)  -C/w wound vac  -Apprec ID recs  -spoke to Plastics- no intervention needed at this time for wound, continue abx and wound vac    ===================HEMATOLOGIC/ONC ===================  -Holding AC for procedure Friday     ===================RHEUMATOLOGIC ===================  Acute gout flare  -feeling significantly better  -C/w colchicine 0.6 mg daily until 2-3 days after flare resolves  -avoid NSAIDS ISO recent procedure     =======================    ENDOCRINE  =====================  A1c: 5/8  -glucose WNL     Hypothyroidism  -F/u TSH  -C/w home regimen of levothyroxine alternating 50/75 mcg every other day.    Attending Attestation:   I have personally seen, examined and participated in the care of this patient. I have reviewed all pertinent clinical information, including history, physical exam, plan and the resident's note. I agree with the resident's note with the following additions:    Infected pacemaker pocket s/p system explant complicated by fib/flutter with RVR and hypertension  HRs 40s-50s, MAPs 70s - restart outpatient Coreg  O2 sats mid to high 90s on room air  Regular diet  Normal renal function  H/H normal, SCDs for DVT   Afebrile, Clindamycin/Levofloxacin -> Vancomycin and Zosyn (11/17- ), cultures negative, wound vac in place  Sugars controlled  No central access.

## 2020-11-18 NOTE — PROGRESS NOTE ADULT - SUBJECTIVE AND OBJECTIVE BOX
CC: F/U for Pocket infection    Saw/spoke to patient. In CCU. No new complaints, feels well.    Allergies  latex (Rash)  penicillin (Rash (Mild))    ANTIMICROBIALS:  piperacillin/tazobactam IVPB.. 3.375 every 8 hours  vancomycin  IVPB 1000 every 12 hours    PE:    Vital Signs Last 24 Hrs  T(C): 36.8 (18 Nov 2020 11:00), Max: 37.2 (17 Nov 2020 22:45)  T(F): 98.2 (18 Nov 2020 11:00), Max: 99 (17 Nov 2020 22:45)  HR: 140 (18 Nov 2020 12:35) (52 - 140)  BP: 146/81 (18 Nov 2020 12:00) (98/53 - 190/107)  BP(mean): 115 (18 Nov 2020 12:00) (68 - 145)  RR: 17 (18 Nov 2020 12:00) (16 - 20)  SpO2: 96% (18 Nov 2020 12:00) (92% - 100%)    Gen: AOx3, NAD, non-toxic  CV: S1+S2 normal, tachycardic; wound vac to PPM site removal  Resp: Clear bilat, no resp distress, no crackles/wheezes  Abd: Soft, nontender, +BS  Ext: No LE edema, no wounds    LABS:                        13.3   10.89 )-----------( 306      ( 18 Nov 2020 04:52 )             41.6     11-18    143  |  103  |  18  ----------------------------<  127<H>  3.8   |  26  |  0.95    Ca    9.1      18 Nov 2020 04:52  Phos  4.3     11-18  Mg     2.3     11-18    TPro  6.0  /  Alb  3.6  /  TBili  0.7  /  DBili  x   /  AST  16  /  ALT  13  /  AlkPhos  68  11-18    MICROBIOLOGY:    .Tissue Other  11-18-20   Testing in progress  --    No polymorphonuclear cells seen per low power field  No organisms seen per oil power field    .Blood Blood-Peripheral  11-17-20   No growth to date.    RADIOLOGY:    11/8 XR:    Impression:    Clear lungs    History states "line placement "however no new catheter or line is identified.

## 2020-11-18 NOTE — PROGRESS NOTE ADULT - ASSESSMENT
====================ASSESSMENT AND PLAN==============      ====================CARDIOVASCULAR==================    Mechaincal Circulatory Support:  [ ] IABP   [ ] Impella 2.5   [ ] Impella CP  Settings:     ==Hemodynamics==     (Date) CVP:      PCWP:        PA S/D:            Cardiac Output:             Cardiac Index:            SVR:    Preload (fluids, diuretics):  Afterload (anti-hypertensives, pressors):  Inotropes:    ==Pump==    Echo Date:  LVEF:                              Regional Wall Motion Abnormaility?:  [ ]Yes   [ ] No, If Yes, Details  Diastolic function:  RV function:   Any change frim prior?: [ ] Yes   [ ] No, If Yes, Details:   Volume status:    ==Coronaries==    Last ischemic workup:   Antiplatelet regimen:   Anticoagulant:   Statin:   Beta blocker:    ==Rhythm==    Current rhythm:  AM EKG Interpretation:   Anti-arrhythmic therapies:   TVP with settings:         ====================== NEUROLOGY=====================  Sedation [ ]Yes   [ ] No  Delirium [ ]Yes   [ ] No      ==================== RESPIRATORY======================  Mechanical Ventilation [ ]    BIPAP [ ]   HFNC [ ]   NC [ ]                 ===================== RENAL =========================    11-17-20 @ 07:01  -  11-18-20 @ 07:00  --------------------------------------------------------  IN: 410 mL / OUT: 300 mL / NET: 110 mL      Renal Replacement Therapy:  [ ] CRRT      [ ] IHD, Last Session:    Fluid removal:     [ ] Diuretic therapy, Regimen:       ==================== GASTROINTESTINAL===================    Diet:  Last BM:   Indication for Stress Ulcer Prophylaxis, [ ] Yes    [ ] No   If Yes, Medication:       ========================INFECTIOUS DISEASE================  T(C): 36.8 (11-18-20 @ 03:00), Max: 37.2 (11-17-20 @ 22:45)  WBC Count: 10.89 K/uL (11-18-20 @ 04:52)  WBC Count: 9.68 K/uL (11-17-20 @ 23:22)  WBC Count: 9.28 K/uL (11-17-20 @ 06:16)  WBC Count: 9.81 K/uL (11-16-20 @ 19:14)      Culture - Blood (collected 11-17-20 @ 04:04)  Source: .Blood Blood-Peripheral  Preliminary Report (11-18-20 @ 05:00):    No growth to date.        Current Antibiotics/Antifungals with start date:     vancomycin  IVPB 1000 milliGRAM(s) IV Intermittent every 12 hours    ===================HEMATOLOGIC/ONC ===================  Hemoglobin: 13.3 g/dL (11-18-20 @ 04:52)  Hemoglobin: 14.0 g/dL (11-17-20 @ 23:22)  Hemoglobin: 13.3 g/dL (11-17-20 @ 06:16)  Hemoglobin: 14.1 g/dL (11-16-20 @ 19:14)    Platelet Count - Automated: 306 K/uL (11-18-20 @ 04:52)  Platelet Count - Automated: 302 K/uL (11-17-20 @ 23:22)  Platelet Count - Automated: 304 K/uL (11-17-20 @ 06:16)  Platelet Count - Automated: 331 K/uL (11-16-20 @ 19:14)    Chemical VTE Prophylaxis:  [ ] Lovenox    [ ] SQH   [ ]NA  Systemic Anticogaulation:  [ ] Yes    [ ] No,  If Yes, Medication and Indication:       =======================    ENDOCRINE  =====================  Insulin drip  [ ] Yes    [ ] No  Basal Insulin [ ] Yes    [ ] No  Bolus insulin [ ] Yes    [ ] No  Sliding Scale  [ ] Yes    [ ] No  Hgb A1c          colchicine 0.6 milliGRAM(s) Oral daily  levothyroxine 50 MICROGram(s) Oral <User Schedule>    ======================= LINES/TUBES  =====================  [place]: (Date placed)  Central Line: (Date placed)  HD Catheter: (Date placed)  Arterial Line: (Date placed)  Endotracheal Tube: (Date placed)  [place]: (Date placed)   75y F PMHx HTN, HLD, hypothyroid, afib (on eliquis), sinus bradycardia s/p PPM p/w pacemaker site infection and gout s/p pacemaker removal 11/17 c/b aflutter and hypertension  ====================CARDIOVASCULAR==================  ==Hemodynamics==  Hypertensive as home ana maria blocking agents held following pacemaker removal  -S/P Hydralazine 5 mg IV last night   -Tolerate asymptomatic essential hypertension in setting of second degree av block.  -Will prefer hydralazine IV if hypertensive above 190s for b/c short acting and not blocking AV node    ==Rhythm==  Atrial flutter w/ conversion to NSR with 2nd degree AV block   -History of sinus bradycardia to 29 s/p ppm now removed  -S/p metorpolol tartrate 25 mg PO in PACU  -Will hold on any AV ana maria blocking agents for now  -F/u EP for further recommendations  -Hold anticoagulation    ====================== NEUROLOGY=====================  No active issues    ==================== RESPIRATORY======================  No active issues. S/p extubation.    ===================== RENAL =========================  No active issues  -Cr 1.07, continue to trend    ==================== GASTROINTESTINAL===================    Diet: Regular diet    ========================INFECTIOUS DISEASE================  Pocket infection  -C/w vancomycin 1 g BID (11/17 AM - )  -S/p Zosyn x1 11/17  -Vancomycin trough before 4th dose. Next at 11/18 prior to PM dose  -F/u blood cultures  -F/u cultures from pocket from procedure  -C/w wound vac  -Plastics consult in AM for wound  -Apprec ID recs    ===================HEMATOLOGIC/ONC ===================  -Holding AC post-procedure for a flutter as above.    ===================RHEUMATOLOGIC ===================  Acute gout flare  -C/w colchicine 0.6 mg daily until 2-3 days after flare resolves  -Would favor c/w colchicine over NSAID as patient just had procedure and reducing bleeding    =======================    ENDOCRINE  =====================  Hypothyroidism  -F/u TSH  -C/w home regimen of levothyroxine alternating 50/75 mcg every other day. 75y F PMHx HTN, HLD, hypothyroid, afib (on eliquis), sinus bradycardia s/p PPM p/w pacemaker site infection and gout s/p pacemaker removal 11/17 c/b aflutter and hypertension  ====================CARDIOVASCULAR==================  ==Hemodynamics==  -pressure stable   -S/P Hydralazine 5 mg IV, metoprolol, lopressor last night   -Start coreg 3.125 Q12    ==Rhythm==  Atrial flutter w/ conversion to NSR with 2nd degree AV block   -History of sinus bradycardia to 29 s/p ppm now removed  -Oxy this AM for pain control   -S/p metorpolol tartrate 25 mg PO in PACU. Resumed coreg today.   -Hold anticoagulation, plan for EP procedure friday     ====================== NEUROLOGY=====================  No active issues    ==================== RESPIRATORY======================  No active issue  -on room air     ===================== RENAL =========================  No active issues  -Cr 1.07, continue to trend    ==================== GASTROINTESTINAL===================    Diet: Regular diet  -senna for bowel regimen ISO opioid use     ========================INFECTIOUS DISEASE================  Pocket infection  -C/w vancomycin 1 g BID, Zosyn Q8 (11/17 AM - )  -Vancomycin trough before 4th dose (17:00 11/18)  -F/u blood cultures (11/17 culture NGTD)  -C/w wound vac  -Plastics consult   -Apprec ID recs    ===================HEMATOLOGIC/ONC ===================  -Holding AC for procedure Friday     ===================RHEUMATOLOGIC ===================  Acute gout flare  -feeling significantly better  -C/w colchicine 0.6 mg daily until 2-3 days after flare resolves  -avoid NSAIDS ISO recent procedure     =======================    ENDOCRINE  =====================  A1c: 5/8  -glucose WNL     Hypothyroidism  -F/u TSH  -C/w home regimen of levothyroxine alternating 50/75 mcg every other day.

## 2020-11-19 LAB
ANION GAP SERPL CALC-SCNC: 14 MMOL/L — SIGNIFICANT CHANGE UP (ref 5–17)
APTT BLD: 30.6 SEC — SIGNIFICANT CHANGE UP (ref 27.5–35.5)
APTT BLD: 79.8 SEC — HIGH (ref 27.5–35.5)
BUN SERPL-MCNC: 16 MG/DL — SIGNIFICANT CHANGE UP (ref 7–23)
CALCIUM SERPL-MCNC: 9.1 MG/DL — SIGNIFICANT CHANGE UP (ref 8.4–10.5)
CHLORIDE SERPL-SCNC: 100 MMOL/L — SIGNIFICANT CHANGE UP (ref 96–108)
CO2 SERPL-SCNC: 25 MMOL/L — SIGNIFICANT CHANGE UP (ref 22–31)
CREAT SERPL-MCNC: 0.94 MG/DL — SIGNIFICANT CHANGE UP (ref 0.5–1.3)
GLUCOSE SERPL-MCNC: 127 MG/DL — HIGH (ref 70–99)
HCT VFR BLD CALC: 42 % — SIGNIFICANT CHANGE UP (ref 34.5–45)
HCT VFR BLD CALC: 42.4 % — SIGNIFICANT CHANGE UP (ref 34.5–45)
HGB BLD-MCNC: 13.5 G/DL — SIGNIFICANT CHANGE UP (ref 11.5–15.5)
HGB BLD-MCNC: 13.6 G/DL — SIGNIFICANT CHANGE UP (ref 11.5–15.5)
INR BLD: 1.11 RATIO — SIGNIFICANT CHANGE UP (ref 0.88–1.16)
MAGNESIUM SERPL-MCNC: 2.1 MG/DL — SIGNIFICANT CHANGE UP (ref 1.6–2.6)
MCHC RBC-ENTMCNC: 29.9 PG — SIGNIFICANT CHANGE UP (ref 27–34)
MCHC RBC-ENTMCNC: 30 PG — SIGNIFICANT CHANGE UP (ref 27–34)
MCHC RBC-ENTMCNC: 32.1 GM/DL — SIGNIFICANT CHANGE UP (ref 32–36)
MCHC RBC-ENTMCNC: 32.1 GM/DL — SIGNIFICANT CHANGE UP (ref 32–36)
MCV RBC AUTO: 93.1 FL — SIGNIFICANT CHANGE UP (ref 80–100)
MCV RBC AUTO: 93.4 FL — SIGNIFICANT CHANGE UP (ref 80–100)
NRBC # BLD: 0 /100 WBCS — SIGNIFICANT CHANGE UP (ref 0–0)
NRBC # BLD: 0 /100 WBCS — SIGNIFICANT CHANGE UP (ref 0–0)
PHOSPHATE SERPL-MCNC: 3 MG/DL — SIGNIFICANT CHANGE UP (ref 2.5–4.5)
PLATELET # BLD AUTO: 284 K/UL — SIGNIFICANT CHANGE UP (ref 150–400)
PLATELET # BLD AUTO: 289 K/UL — SIGNIFICANT CHANGE UP (ref 150–400)
POTASSIUM SERPL-MCNC: 3.9 MMOL/L — SIGNIFICANT CHANGE UP (ref 3.5–5.3)
POTASSIUM SERPL-SCNC: 3.9 MMOL/L — SIGNIFICANT CHANGE UP (ref 3.5–5.3)
PROTHROM AB SERPL-ACNC: 13.3 SEC — SIGNIFICANT CHANGE UP (ref 10.6–13.6)
RBC # BLD: 4.51 M/UL — SIGNIFICANT CHANGE UP (ref 3.8–5.2)
RBC # BLD: 4.54 M/UL — SIGNIFICANT CHANGE UP (ref 3.8–5.2)
RBC # FLD: 13.1 % — SIGNIFICANT CHANGE UP (ref 10.3–14.5)
RBC # FLD: 13.1 % — SIGNIFICANT CHANGE UP (ref 10.3–14.5)
SARS-COV-2 IGG SERPL QL IA: NEGATIVE — SIGNIFICANT CHANGE UP
SARS-COV-2 IGM SERPL IA-ACNC: <0.1 INDEX — SIGNIFICANT CHANGE UP
SODIUM SERPL-SCNC: 139 MMOL/L — SIGNIFICANT CHANGE UP (ref 135–145)
VANCOMYCIN FLD-MCNC: 13.2 UG/ML — SIGNIFICANT CHANGE UP
WBC # BLD: 6.7 K/UL — SIGNIFICANT CHANGE UP (ref 3.8–10.5)
WBC # BLD: 8.38 K/UL — SIGNIFICANT CHANGE UP (ref 3.8–10.5)
WBC # FLD AUTO: 6.7 K/UL — SIGNIFICANT CHANGE UP (ref 3.8–10.5)
WBC # FLD AUTO: 8.38 K/UL — SIGNIFICANT CHANGE UP (ref 3.8–10.5)

## 2020-11-19 PROCEDURE — 93010 ELECTROCARDIOGRAM REPORT: CPT | Mod: 76,77

## 2020-11-19 PROCEDURE — 99232 SBSQ HOSP IP/OBS MODERATE 35: CPT

## 2020-11-19 PROCEDURE — 99233 SBSQ HOSP IP/OBS HIGH 50: CPT | Mod: GC,25

## 2020-11-19 PROCEDURE — 99024 POSTOP FOLLOW-UP VISIT: CPT

## 2020-11-19 RX ORDER — HEPARIN SODIUM 5000 [USP'U]/ML
INJECTION INTRAVENOUS; SUBCUTANEOUS
Qty: 25000 | Refills: 0 | Status: DISCONTINUED | OUTPATIENT
Start: 2020-11-19 | End: 2020-11-20

## 2020-11-19 RX ORDER — POTASSIUM CHLORIDE 20 MEQ
20 PACKET (EA) ORAL ONCE
Refills: 0 | Status: COMPLETED | OUTPATIENT
Start: 2020-11-19 | End: 2020-11-19

## 2020-11-19 RX ORDER — HEPARIN SODIUM 5000 [USP'U]/ML
7500 INJECTION INTRAVENOUS; SUBCUTANEOUS EVERY 6 HOURS
Refills: 0 | Status: DISCONTINUED | OUTPATIENT
Start: 2020-11-19 | End: 2020-11-23

## 2020-11-19 RX ORDER — HEPARIN SODIUM 5000 [USP'U]/ML
3500 INJECTION INTRAVENOUS; SUBCUTANEOUS EVERY 6 HOURS
Refills: 0 | Status: DISCONTINUED | OUTPATIENT
Start: 2020-11-19 | End: 2020-11-23

## 2020-11-19 RX ORDER — SOTALOL HCL 120 MG
80 TABLET ORAL
Refills: 0 | Status: DISCONTINUED | OUTPATIENT
Start: 2020-11-19 | End: 2020-11-20

## 2020-11-19 RX ADMIN — PITAVASTATIN CALCIUM 2 MILLIGRAM(S): 1.04 TABLET, FILM COATED ORAL at 22:32

## 2020-11-19 RX ADMIN — Medication 250 MILLIGRAM(S): at 17:36

## 2020-11-19 RX ADMIN — HEPARIN SODIUM 1700 UNIT(S)/HR: 5000 INJECTION INTRAVENOUS; SUBCUTANEOUS at 17:03

## 2020-11-19 RX ADMIN — Medication 0.6 MILLIGRAM(S): at 11:54

## 2020-11-19 RX ADMIN — Medication 80 MILLIGRAM(S): at 20:08

## 2020-11-19 RX ADMIN — PIPERACILLIN AND TAZOBACTAM 25 GRAM(S): 4; .5 INJECTION, POWDER, LYOPHILIZED, FOR SOLUTION INTRAVENOUS at 17:36

## 2020-11-19 RX ADMIN — HEPARIN SODIUM 1700 UNIT(S)/HR: 5000 INJECTION INTRAVENOUS; SUBCUTANEOUS at 09:12

## 2020-11-19 RX ADMIN — PIPERACILLIN AND TAZOBACTAM 25 GRAM(S): 4; .5 INJECTION, POWDER, LYOPHILIZED, FOR SOLUTION INTRAVENOUS at 01:32

## 2020-11-19 RX ADMIN — CHLORHEXIDINE GLUCONATE 1 APPLICATION(S): 213 SOLUTION TOPICAL at 22:31

## 2020-11-19 RX ADMIN — Medication 20 MILLIEQUIVALENT(S): at 05:45

## 2020-11-19 RX ADMIN — Medication 75 MICROGRAM(S): at 05:45

## 2020-11-19 RX ADMIN — PIPERACILLIN AND TAZOBACTAM 25 GRAM(S): 4; .5 INJECTION, POWDER, LYOPHILIZED, FOR SOLUTION INTRAVENOUS at 09:33

## 2020-11-19 RX ADMIN — Medication 80 MILLIGRAM(S): at 09:13

## 2020-11-19 RX ADMIN — Medication 250 MILLIGRAM(S): at 05:44

## 2020-11-19 NOTE — DIETITIAN INITIAL EVALUATION ADULT. - PROBLEM SELECTOR PLAN 7
- f/u TSH  - pt takes levothyroxine 50 mcg one day followed by 75 mcg the next day  - c/w as per pt's home regimen

## 2020-11-19 NOTE — DIETITIAN INITIAL EVALUATION ADULT. - PERTINENT MEDS FT
MEDICATIONS  (STANDING):  chlorhexidine 2% Cloths 1 Application(s) Topical at bedtime  colchicine 0.6 milliGRAM(s) Oral daily  heparin  Infusion.  Unit(s)/Hr (17 mL/Hr) IV Continuous <Continuous>  levothyroxine 50 MICROGram(s) Oral <User Schedule>  levothyroxine 75 MICROGram(s) Oral <User Schedule>  piperacillin/tazobactam IVPB.. 3.375 Gram(s) IV Intermittent every 8 hours  pitavastatin 2 milliGRAM(s) Oral daily  polyethylene glycol 3350 17 Gram(s) Oral every 24 hours  senna 2 Tablet(s) Oral at bedtime  sotalol 80 milliGRAM(s) Oral two times a day  vancomycin  IVPB 1000 milliGRAM(s) IV Intermittent every 12 hours    MEDICATIONS  (PRN):  benzocaine 15 mG/menthol 3.6 mG (Sugar-Free) Lozenge 1 Lozenge Oral four times a day PRN Sore Throat  heparin   Injectable 7500 Unit(s) IV Push every 6 hours PRN For aPTT less than 40  heparin   Injectable 3500 Unit(s) IV Push every 6 hours PRN For aPTT between 40 - 57  temazepam 30 milliGRAM(s) Oral at bedtime PRN Insomnia

## 2020-11-19 NOTE — DIETITIAN INITIAL EVALUATION ADULT. - ORAL INTAKE PTA/DIET HISTORY
Fair appetite and intake PTA. Reports has been hospitalized frequently, intake has decreased than that of baseline; however, still eating % of meals provided, admits intake a few months ago was excessive. Does not follow any certain restrictions. NKFA. Endorses micronutrient supplementation PTA but could not describe further at this time.

## 2020-11-19 NOTE — PROGRESS NOTE ADULT - SUBJECTIVE AND OBJECTIVE BOX
CIRILO ROLDAN  MRN-[id number]  Patient is a 75y old  Female who presents with a chief complaint of Per chart: 75y F PMHx HTN, HLD, hypothyroid, afib (on eliquis), sinus bradycardia s/p PPM p/w pacemaker site infection and gout s/p pacemaker removal 11/17 c/b aflutter and hypertension, now on zosyn and vanc. (19 Nov 2020 11:51)    HPI:  75y F PMHx HTN, HLD, hypothyroid, afib (on eliquis), 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 She had a R sided dual chamber PPM placed 2.5 years ago by a Dr. Moore for sinus bradycardia to 29, otherwise asymptomatic. She was without complications until 10/30 when her cardiology office [place] received a transmission from her PPM that showed "something wrong". She was advised to get the PPM evaluated and saw a Dr. Kent who noted one of the wires was loose. On11/5, he elected to put in a new wire and leave the loose wire in. However, a few days after that procedure the pt started noticing purulent discharge from the pacemaker incision site and presented to [facility]. She was prescribed a 3 day course of clindamycin but the purulent drainage did not resolved. She then called went back to [place] and has since completed 5 out of 20 day course of levofloxacin. On 11/14 she noticed acute sharp L 1st toe foot pain. denies trauma, bleeding, or discharge. Pt reports no hx of gout or pseudogout. She called Dr. Moore's office who recommended she come to [facility] for PPM removal and further evaluation. Pt still reports drainage from the ppm incision site.    Of note, pt was advised to stop taking her eliquis and diltiazem on 11/13 as she may get a procedure so she has not taken these medications since 11/13 pm.     ED course:   afebrile, HR 68, /102, RR 18, 97% on RA  Received CXR (16 Nov 2020 23:02)      Hospital Course:    24 HOUR EVENTS:    REVIEW OF SYSTEMS:   Constitutional: No weakness, fevers, or chills  Eyes/ENT: No visual changes  Respiratory: No cough, wheezing, hemoptysis  Cardiovascular: No chest pain, no palpitations  Gastrointestinal: No abdominal pain. No nausea, vomiting, hematemesis.   Genitourinary: No dysuria  Neurological: No numbness, no weakness  Skin: No itching, rashes    ICU Vital Signs Last 24 Hrs  T(C): 36.2 (19 Nov 2020 20:00), Max: 37.1 (19 Nov 2020 03:00)  T(F): 97.2 (19 Nov 2020 20:00), Max: 98.8 (19 Nov 2020 03:00)  HR: 130 (19 Nov 2020 23:00) (48 - 136)  BP: 153/108 (19 Nov 2020 23:00) (93/67 - 181/103)  BP(mean): 125 (19 Nov 2020 23:00) (69 - 138)  ABP: --  ABP(mean): --  RR: 17 (19 Nov 2020 23:00) (12 - 22)  SpO2: 93% (19 Nov 2020 23:00) (93% - 98%)      CVP(mm Hg): --  CO: --  CI: --  PA: --  PA(mean): --  PA(direct): --  PCWP: --  LA: --  RA: --  SVR: --  SVRI: --  PVR: --  PVRI: --  I&O's Summary    18 Nov 2020 07:01  -  19 Nov 2020 07:00  --------------------------------------------------------  IN: 1760 mL / OUT: 650 mL / NET: 1110 mL    19 Nov 2020 07:01  -  19 Nov 2020 23:48  --------------------------------------------------------  IN: 1350 mL / OUT: 550 mL / NET: 800 mL        CAPILLARY BLOOD GLUCOSE    CAPILLARY BLOOD GLUCOSE          PHYSICAL EXAM:   General: No acute distress  Eyes: EOMI, PERRLA, conjunctiva and sclera clear  Chest/Lung: CTAB, no wheezes, rales, or rhonchi  Heart: Regular rate, regular rhythm. Normal S1/S2. No murmurs, rubs, or gallops.  Abdomen: Soft, nontender, nondistended. Normal bowel sounds.  Extremites: 2+ peripheral pulses B/L. No clubbing, cyanosis, or edema.  Neurology: A&O x3, no focal deficits  Skin: No rashes or lesions  ============================I/O===========================   I&O's Detail    18 Nov 2020 07:01  -  19 Nov 2020 07:00  --------------------------------------------------------  IN:    IV PiggyBack: 800 mL    Oral Fluid: 960 mL  Total IN: 1760 mL    OUT:    VAC (Vacuum Assisted Closure) System (mL): 0 mL    Voided (mL): 650 mL  Total OUT: 650 mL    Total NET: 1110 mL      19 Nov 2020 07:01  -  19 Nov 2020 23:48  --------------------------------------------------------  IN:    Heparin Infusion: 255 mL    IV PiggyBack: 250 mL    IV PiggyBack: 125 mL    Oral Fluid: 720 mL  Total IN: 1350 mL    OUT:    VAC (Vacuum Assisted Closure) System (mL): 0 mL    Voided (mL): 550 mL  Total OUT: 550 mL    Total NET: 800 mL        ============================ LABS =========================                        13.6   8.38  )-----------( 289      ( 19 Nov 2020 16:33 )             42.4     11-19    139  |  100  |  16  ----------------------------<  127<H>  3.9   |  25  |  0.94    Ca    9.1      19 Nov 2020 04:58  Phos  3.0     11-19  Mg     2.1     11-19    TPro  6.0  /  Alb  3.6  /  TBili  0.7  /  DBili  x   /  AST  16  /  ALT  13  /  AlkPhos  68  11-18                LIVER FUNCTIONS - ( 18 Nov 2020 04:52 )  Alb: 3.6 g/dL / Pro: 6.0 g/dL / ALK PHOS: 68 U/L / ALT: 13 U/L / AST: 16 U/L / GGT: x           PT/INR - ( 19 Nov 2020 04:58 )   PT: 13.3 sec;   INR: 1.11 ratio         PTT - ( 19 Nov 2020 16:33 )  PTT:79.8 sec        ======================Micro/Rad/Cardio=================  Telemetry: Reviewed   EKG: Reviewed  CXR: Reviewed  Culture: Reviewed   Echo: Reviewed  Cath: Reviewed  ======================================================  PAST MEDICAL & SURGICAL HISTORY:  Hypothyroid    Afib    HLD (hyperlipidemia)    HTN (hypertension)    History of appendectomy    H/O cardiac pacemaker      ====================ASSESSMENT ==============    Plan:  ====================== NEUROLOGY=====================  temazepam 30 milliGRAM(s) Oral at bedtime PRN Insomnia    ==================== RESPIRATORY======================  Mechanical Ventilation:      ====================CARDIOVASCULAR==================  sotalol 80 milliGRAM(s) Oral two times a day    ===================HEMATOLOGIC/ONC ===================  heparin   Injectable 7500 Unit(s) IV Push every 6 hours PRN For aPTT less than 40  heparin   Injectable 3500 Unit(s) IV Push every 6 hours PRN For aPTT between 40 - 57  heparin  Infusion.  Unit(s)/Hr (17 mL/Hr) IV Continuous <Continuous>    ===================== RENAL =========================  Continue monitoring urine output    ==================== GASTROINTESTINAL===================  polyethylene glycol 3350 17 Gram(s) Oral every 24 hours    =======================    ENDOCRINE  =====================  colchicine 0.6 milliGRAM(s) Oral daily  levothyroxine 50 MICROGram(s) Oral <User Schedule>  levothyroxine 75 MICROGram(s) Oral <User Schedule>  pitavastatin 2 milliGRAM(s) Oral daily    ========================INFECTIOUS DISEASE================  piperacillin/tazobactam IVPB.. 3.375 Gram(s) IV Intermittent every 8 hours  vancomycin  IVPB 1000 milliGRAM(s) IV Intermittent every 12 hours      Patient requires continuous monitoring with bedside rhythm monitoring, pulse ox monitoring, and intermittent blood gas analysis. Care plan discussed with ICU care team. Patient remained critical and at risk for life threatening decompensation.  Patient seen, examined and plan discussed with CCU team during rounds.     I have personally provided 35 minutes of critical care time excluding time spent on separate procedures.    By signing my name below, I, Deanna Almaraz, attest that this documentation has been prepared under the direction and in the presence of Mandy Johnson NP   Electronically signed: Giovani Ennis, 11-19-20 @ 23:48    I, Mandy Johnson NP  , personally performed the services described in this documentation. all medical record entries made by the jorge luisibbraydon were at my direction and in my presence. I have reviewed the chart and agree that the record reflects my personal performance and is accurate and complete  Electronically signed: Mandy Johnson NP        CIRILO ROLDAN  MRN-[id number]  Patient is a 75y old  Female who presents with a chief complaint of Per chart: 75y F PMHx HTN, HLD, hypothyroid, afib (on eliquis), sinus bradycardia s/p PPM p/w pacemaker site infection and gout s/p pacemaker removal 11/17 c/b aflutter and hypertension, now on zosyn and vanc. (19 Nov 2020 11:51)    HPI:  75y F PMHx HTN, HLD, hypothyroid, afib (on eliquis), 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 She had a R sided dual chamber PPM placed 2.5 years ago by a Dr. Moore for sinus bradycardia to 29, otherwise asymptomatic. She was without complications until 10/30 when her cardiology office [place] received a transmission from her PPM that showed "something wrong". She was advised to get the PPM evaluated and saw a Dr. Kent who noted one of the wires was loose. On11/5, he elected to put in a new wire and leave the loose wire in. However, a few days after that procedure the pt started noticing purulent discharge from the pacemaker incision site and presented to [facility]. She was prescribed a 3 day course of clindamycin but the purulent drainage did not resolved. She then called went back to [place] and has since completed 5 out of 20 day course of levofloxacin. On 11/14 she noticed acute sharp L 1st toe foot pain. denies trauma, bleeding, or discharge. Pt reports no hx of gout or pseudogout. She called Dr. Moore's office who recommended she come to [facility] for PPM removal and further evaluation. Pt still reports drainage from the ppm incision site.    Of note, pt was advised to stop taking her eliquis and diltiazem on 11/13 as she may get a procedure so she has not taken these medications since 11/13 pm.     ED course:   afebrile, HR 68, /102, RR 18, 97% on RA  Received CXR (16 Nov 2020 23:02)      Hospital Course:  11/17 PPM removed  11/18 Transferred to CCU for monitoring and treatment of Aflutter    24 HOUR EVENTS:  NPO for planned PPM placement tomorrow. D/C'd Coreg this morning in favor of PO Sotalol.      REVIEW OF SYSTEMS:   Constitutional: No weakness, fevers, or chills  Eyes/ENT: No visual changes  Respiratory: No cough, wheezing, hemoptysis  Cardiovascular: No chest pain, no palpitations  Gastrointestinal: No abdominal pain. No nausea, vomiting, hematemesis.   Genitourinary: No dysuria  Neurological: No numbness, no weakness  Skin: No itching, rashes    ICU Vital Signs Last 24 Hrs  T(C): 36.2 (19 Nov 2020 20:00), Max: 37.1 (19 Nov 2020 03:00)  T(F): 97.2 (19 Nov 2020 20:00), Max: 98.8 (19 Nov 2020 03:00)  HR: 130 (19 Nov 2020 23:00) (48 - 136)  BP: 153/108 (19 Nov 2020 23:00) (93/67 - 181/103)  BP(mean): 125 (19 Nov 2020 23:00) (69 - 138)  ABP: --  ABP(mean): --  RR: 17 (19 Nov 2020 23:00) (12 - 22)  SpO2: 93% (19 Nov 2020 23:00) (93% - 98%)      I&O's Summary    18 Nov 2020 07:01  -  19 Nov 2020 07:00  --------------------------------------------------------  IN: 1760 mL / OUT: 650 mL / NET: 1110 mL    19 Nov 2020 07:01  -  19 Nov 2020 23:48  --------------------------------------------------------  IN: 1350 mL / OUT: 550 mL / NET: 800 mL          PHYSICAL EXAM:   General: No acute distress  Eyes: EOMI, PERRLA, conjunctiva and sclera clear  Chest/Lung: CTAB, no wheezes, rales, or rhonchi  Heart: Regular rate, regular rhythm. Normal S1/S2. No murmurs, rubs, or gallops.  Abdomen: Soft, nontender, nondistended. Normal bowel sounds.  Extremites: 2+ peripheral pulses B/L. No clubbing, cyanosis, or edema.  Neurology: A&O x3, no focal deficits  Skin: Incision on upper right chest with wound vac. Appears clean and dry. Mildly tender to palpation.   ============================I/O===========================   I&O's Detail    18 Nov 2020 07:01  -  19 Nov 2020 07:00  --------------------------------------------------------  IN:    IV PiggyBack: 800 mL    Oral Fluid: 960 mL  Total IN: 1760 mL    OUT:    VAC (Vacuum Assisted Closure) System (mL): 0 mL    Voided (mL): 650 mL  Total OUT: 650 mL    Total NET: 1110 mL      19 Nov 2020 07:01  -  19 Nov 2020 23:48  --------------------------------------------------------  IN:    Heparin Infusion: 255 mL    IV PiggyBack: 250 mL    IV PiggyBack: 125 mL    Oral Fluid: 720 mL  Total IN: 1350 mL    OUT:    VAC (Vacuum Assisted Closure) System (mL): 0 mL    Voided (mL): 550 mL  Total OUT: 550 mL    Total NET: 800 mL        ============================ LABS =========================                        13.6   8.38  )-----------( 289      ( 19 Nov 2020 16:33 )             42.4     11-19    139  |  100  |  16  ----------------------------<  127<H>  3.9   |  25  |  0.94    Ca    9.1      19 Nov 2020 04:58  Phos  3.0     11-19  Mg     2.1     11-19    TPro  6.0  /  Alb  3.6  /  TBili  0.7  /  DBili  x   /  AST  16  /  ALT  13  /  AlkPhos  68  11-18                LIVER FUNCTIONS - ( 18 Nov 2020 04:52 )  Alb: 3.6 g/dL / Pro: 6.0 g/dL / ALK PHOS: 68 U/L / ALT: 13 U/L / AST: 16 U/L / GGT: x           PT/INR - ( 19 Nov 2020 04:58 )   PT: 13.3 sec;   INR: 1.11 ratio         PTT - ( 19 Nov 2020 16:33 )  PTT:79.8 sec        ======================Micro/Rad/Cardio=================  Telemetry: Reviewed   EKG: Reviewed  CXR: Reviewed  Culture: Reviewed   Cath: Reviewed  ======================================================  PAST MEDICAL & SURGICAL HISTORY:  Hypothyroid    Afib    HLD (hyperlipidemia)    HTN (hypertension)    History of appendectomy    H/O cardiac pacemaker      ====================ASSESSMENT ==============  S/p PPM removal 11/17  Atrial Flutter   Hypothyroidism  Pocket infection  Acute gout flare    Plan:  ====================== NEUROLOGY=====================  A&Ox3, no active neuro issues  - Continue to assess and monitor status as per protocol  - Sleep regimen with Temazepam 30mg QHS prn     temazepam 30 milliGRAM(s) Oral at bedtime PRN Insomnia    ==================== RESPIRATORY======================  Comfortable on room air, SpO2 > 97%  - Continue to monitor SpO2 via pulse oximetry  - Encourage bedside spirometry    ====================CARDIOVASCULAR==================  Atrial flutter w/ conversion to NSR with 2nd degree AV block   - History of sinus bradycardia to 29 s/p ppm now removed, with wound vac in place   - S/p metoprolol tartrate 25 mg PO in PACU.   - C/w Sotalol 80mg BID for HR and BP  -  Baseline EKG, plus EKG x2 hr after each dose of Sotalol to monitor QTc prolongation.   -  NPO for planned PPM placement tomorrow     sotalol 80 milliGRAM(s) Oral two times a day  pitavastatin 2 milliGRAM(s) Oral daily    ===================HEMATOLOGIC/ONC ===================  H/H  13.6/42.4  - Continue to monitor H&H, PLTs.   - C/w Heparin infusion   - C/w IVP Heparin according to pTT protocol     heparin   Injectable 7500 Unit(s) IV Push every 6 hours PRN For aPTT less than 40  heparin   Injectable 3500 Unit(s) IV Push every 6 hours PRN For aPTT between 40 - 57  heparin  Infusion.  Unit(s)/Hr (17 mL/Hr) IV Continuous <Continuous>    ===================== RENAL =========================  No active issues  - SCr 0.94 today  - Continue to monitor I/Os, BUN/Cr, and urine output.   - Monitor and replete electrolytes prn, keeping K > 4 and Mg >2.    ==================== GASTROINTESTINAL===================  NPO for planned PPM placement tomorrow   - Continue bowel regimen with Miralax    polyethylene glycol 3350 17 Gram(s) Oral every 24 hours    =======================    ENDOCRINE  =====================  No active issues  - Continue to monitor blood glucose levels    Hypothyroidism  - C/w Synthroid 50mcg, 75mcg    colchicine 0.6 milliGRAM(s) Oral daily  levothyroxine 50 MICROGram(s) Oral <User Schedule>  levothyroxine 75 MICROGram(s) Oral <User Schedule>    ========================INFECTIOUS DISEASE================  Pocket infection  - Continue with IV Vanco 1g q12h, IV Zosyn 3.375g q8h, with daily vanco troughs  - F/u cultures: BCx 11/17 and pocket cultures NGTD  - Temp 99.5F, WBC within normal limits at 10.89  - Continue to trend fever curve & WBC  - ID okay with alternative PPM placement provided no new s/s of sepsis/ infection at placement site.     piperacillin/tazobactam IVPB.. 3.375 Gram(s) IV Intermittent every 8 hours  vancomycin  IVPB 1000 milliGRAM(s) IV Intermittent every 12 hours    ===================RHEUMATOLOGIC ===================  Acute gout flare  - C/w colchicine 0.6 mg daily until 2-3 days after flare resolves  - Avoid NSAIDS ISO recent procedure       Patient requires continuous monitoring with bedside rhythm monitoring, pulse ox monitoring, and intermittent blood gas analysis. Care plan discussed with ICU care team. Patient remained critical and at risk for life threatening decompensation.  Patient seen, examined and plan discussed with CCU team during rounds.     I have personally provided 35 minutes of critical care time excluding time spent on separate procedures.    By signing my name below, Deanna GU, attest that this documentation has been prepared under the direction and in the presence of Mandy Johnson NP   Electronically signed: Giovani Ennis, 11-19-20 @ 23:48    RICCARDO, Mandy Johnson NP  , personally performed the services described in this documentation. all medical record entries made by the jorge luisibe were at my direction and in my presence. I have reviewed the chart and agree that the record reflects my personal performance and is accurate and complete  Electronically signed: Mandy Johnson NP        CIRILO ROLDAN  MRN-[id number]  Patient is a 75y old  Female who presents with a chief complaint of Per chart: 75y F PMHx HTN, HLD, hypothyroid, afib (on eliquis), sinus bradycardia s/p PPM p/w pacemaker site infection and gout s/p pacemaker removal 11/17 c/b aflutter and hypertension, now on zosyn and vanc. (19 Nov 2020 11:51)    HPI:  75y F PMHx HTN, HLD, hypothyroid, afib (on eliquis), 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 She had a R sided dual chamber PPM placed 2.5 years ago by a Dr. Moore for sinus bradycardia to 29, otherwise asymptomatic. She was without complications until 10/30 when her cardiology office [place] received a transmission from her PPM that showed "something wrong". She was advised to get the PPM evaluated and saw a Dr. Kent who noted one of the wires was loose. On11/5, he elected to put in a new wire and leave the loose wire in. However, a few days after that procedure the pt started noticing purulent discharge from the pacemaker incision site and presented to [facility]. She was prescribed a 3 day course of clindamycin but the purulent drainage did not resolved. She then called went back to [place] and has since completed 5 out of 20 day course of levofloxacin. On 11/14 she noticed acute sharp L 1st toe foot pain. denies trauma, bleeding, or discharge. Pt reports no hx of gout or pseudogout. She called Dr. Moore's office who recommended she come to [facility] for PPM removal and further evaluation. Pt still reports drainage from the ppm incision site.    Of note, pt was advised to stop taking her eliquis and diltiazem on 11/13 as she may get a procedure so she has not taken these medications since 11/13 pm.     ED course:   afebrile, HR 68, /102, RR 18, 97% on RA  Received CXR (16 Nov 2020 23:02)      Hospital Course:  11/17 PPM removed  11/18 Transferred to CCU for monitoring and treatment of Aflutter    24 HOUR EVENTS:  - coreg d/c'd, started on sotalol 80 BID  - started on heparin gtt    REVIEW OF SYSTEMS:   Constitutional: No weakness, fevers, or chills  Eyes/ENT: No visual changes  Respiratory: No cough, wheezing, hemoptysis  Cardiovascular: No chest pain, no palpitations  Gastrointestinal: No abdominal pain. No nausea, vomiting, hematemesis.   Genitourinary: No dysuria  Neurological: No numbness, no weakness  Skin: No itching, rashes    ICU Vital Signs Last 24 Hrs  T(C): 36.2 (19 Nov 2020 20:00), Max: 37.1 (19 Nov 2020 03:00)  T(F): 97.2 (19 Nov 2020 20:00), Max: 98.8 (19 Nov 2020 03:00)  HR: 130 (19 Nov 2020 23:00) (48 - 136)  BP: 153/108 (19 Nov 2020 23:00) (93/67 - 181/103)  BP(mean): 125 (19 Nov 2020 23:00) (69 - 138)  RR: 17 (19 Nov 2020 23:00) (12 - 22)  SpO2: 93% (19 Nov 2020 23:00) (93% - 98%)      I&O's Summary    18 Nov 2020 07:01  -  19 Nov 2020 07:00  --------------------------------------------------------  IN: 1760 mL / OUT: 650 mL / NET: 1110 mL    19 Nov 2020 07:01  -  19 Nov 2020 23:48  --------------------------------------------------------  IN: 1350 mL / OUT: 550 mL / NET: 800 mL          PHYSICAL EXAM:   General: No acute distress  Eyes: EOMI, PERRLA, conjunctiva and sclera clear  Chest/Lung: CTAB, no wheezes, rales, or rhonchi  Heart: Regular rate, regular rhythm. Normal S1/S2. No murmurs, rubs, or gallops.  Abdomen: Soft, nontender, nondistended. Normal bowel sounds.  Extremites: 2+ peripheral pulses B/L. No clubbing, cyanosis, or edema.  Neurology: A&O x3, no focal deficits  Skin: Incision on upper right chest with wound vac. Appears clean and dry. Mildly tender to palpation.   ============================I/O===========================   I&O's Detail    18 Nov 2020 07:01  -  19 Nov 2020 07:00  --------------------------------------------------------  IN:    IV PiggyBack: 800 mL    Oral Fluid: 960 mL  Total IN: 1760 mL    OUT:    VAC (Vacuum Assisted Closure) System (mL): 0 mL    Voided (mL): 650 mL  Total OUT: 650 mL    Total NET: 1110 mL      19 Nov 2020 07:01  -  19 Nov 2020 23:48  --------------------------------------------------------  IN:    Heparin Infusion: 255 mL    IV PiggyBack: 250 mL    IV PiggyBack: 125 mL    Oral Fluid: 720 mL  Total IN: 1350 mL    OUT:    VAC (Vacuum Assisted Closure) System (mL): 0 mL    Voided (mL): 550 mL  Total OUT: 550 mL    Total NET: 800 mL        ============================ LABS =========================                        13.6   8.38  )-----------( 289      ( 19 Nov 2020 16:33 )             42.4     11-19    139  |  100  |  16  ----------------------------<  127<H>  3.9   |  25  |  0.94    Ca    9.1      19 Nov 2020 04:58  Phos  3.0     11-19  Mg     2.1     11-19    TPro  6.0  /  Alb  3.6  /  TBili  0.7  /  DBili  x   /  AST  16  /  ALT  13  /  AlkPhos  68  11-18                LIVER FUNCTIONS - ( 18 Nov 2020 04:52 )  Alb: 3.6 g/dL / Pro: 6.0 g/dL / ALK PHOS: 68 U/L / ALT: 13 U/L / AST: 16 U/L / GGT: x           PT/INR - ( 19 Nov 2020 04:58 )   PT: 13.3 sec;   INR: 1.11 ratio         PTT - ( 19 Nov 2020 16:33 )  PTT:79.8 sec        ======================Micro/Rad/Cardio=================  Telemetry: Reviewed   EKG: Reviewed  CXR: Reviewed  Culture: Reviewed   Cath: Reviewed  ======================================================  PAST MEDICAL & SURGICAL HISTORY:  Hypothyroid    Afib    HLD (hyperlipidemia)    HTN (hypertension)    History of appendectomy    H/O cardiac pacemaker      ====================ASSESSMENT ==============  S/p PPM removal 11/17  Atrial Flutter   Hypothyroidism  Pocket infection  Acute gout flare    Plan:  ====================== NEUROLOGY=====================  A&Ox3, no active neuro issues  - Continue to assess and monitor status as per protocol  - Sleep regimen with Temazepam 30mg QHS prn     ==================== RESPIRATORY======================  Comfortable on room air, SpO2 > 97%  - Continue to monitor SpO2 via pulse oximetry  - Encourage bedside spirometry    ====================CARDIOVASCULAR==================  Atrial flutter w/ conversion to NSR with 2nd degree AV block   - History of sinus bradycardia to 29 s/p ppm now removed, with wound vac in place   - S/p metoprolol tartrate 25 mg PO in PACU.   - C/w Sotalol 80mg BID for HR and BP  -  Baseline EKG, plus EKG x2 hr after each dose of Sotalol to monitor QTc prolongation.   - started on heparin gtt    ===================HEMATOLOGIC/ONC ===================  H/H  13.6/42.4  - Continue to monitor H&H, PLTs.   - C/w Heparin infusion   - C/w IVP Heparin according to pTT protocol     ===================== RENAL =========================  No active issues  - SCr 0.94 today  - Continue to monitor I/Os, BUN/Cr, and urine output.   - Monitor and replete electrolytes prn, keeping K > 4 and Mg >2.    ==================== GASTROINTESTINAL===================  - tolerating regular diet  - Continue bowel regimen with Miralax  =======================    ENDOCRINE  =====================  No active issues  - Continue to monitor blood glucose levels    Hypothyroidism  - C/w Synthroid 50mcg, 75mcg    ========================INFECTIOUS DISEASE================  Pocket infection  - Continue with IV Vanco 1g q12h, IV Zosyn 3.375g q8h, with daily vanco troughs  - F/u cultures: BCx 11/17 and pocket cultures NGTD  - Temp 99.5F, WBC within normal limits at 10.89  - Continue to trend fever curve & WBC  - ID okay with alternative PPM placement provided no new s/s of sepsis/ infection at placement site.     ===================RHEUMATOLOGIC ===================  Acute gout flare  - C/w colchicine 0.6 mg daily until 2-3 days after flare resolves  - Avoid NSAIDS ISO recent procedure       Patient requires continuous monitoring with bedside rhythm monitoring, pulse ox monitoring, and intermittent blood gas analysis. Care plan discussed with ICU care team. Patient remained critical and at risk for life threatening decompensation.  Patient seen, examined and plan discussed with CCU team during rounds.     I have personally provided 35 minutes of critical care time excluding time spent on separate procedures.    By signing my name below, I, Deanna Almaraz, attest that this documentation has been prepared under the direction and in the presence of Mandy Johnson NP   Electronically signed: Giovani Ennis, 11-19-20 @ 23:48    I, Mandy Johnson NP  , personally performed the services described in this documentation. all medical record entries made by the jorge luisibe were at my direction and in my presence. I have reviewed the chart and agree that the record reflects my personal performance and is accurate and complete  Electronically signed: Mandy Johnson NP

## 2020-11-19 NOTE — PROGRESS NOTE ADULT - ASSESSMENT
75 year old female with PMHx HTN, HLD, hypothyroid, PAF (on xarelto), SSS, s/p ILR, s/p right sided PPM with capped Biotronik RA/RV leads from 2009, new RA lead 2014, generator change (STJ) 2018 and RV lead revision 11/5/20 p/w pacemaker pocket infection. Patient underwent PPM system extraction with rapid Afib s/p DCCV in the OR on 11/17. Now with increased burden of rapid PAF/PAFL.     1. PPM pocket infection s/p extraction   2. SSS s/p PPM  3. pAfib/pAFL with RVR  4. Left foot pain (?gout), started on colchicine     --Start full anticoagulation Heparin drip, no initial bolus  --Start Sotalol 80mg Q12hr  --Need baseline ECG and 2 hours after each dose of sotalol to monitor for QTc prolongation  --D/C Coreg  --f/u wound and blood cultures, no growth to date   --NPO after MN for possible PPM reimplant tomorrow pending cultures and ID rec  --Plan discussed with CICU [place]  [id number]   75 year old female with PMHx HTN, HLD, hypothyroid, PAF (on xarelto), SSS, s/p ILR, s/p right sided PPM with capped Biotronik RA/RV leads from 2009, new RA lead 2014, generator change (STJ) 2018 and RV lead revision 11/5/20 p/w pacemaker pocket infection. Patient underwent PPM system extraction with rapid Afib s/p DCCV in the OR on 11/17. Now with increased burden of rapid PAF/PAFL.     1. PPM pocket infection s/p extraction   2. SSS s/p PPM  3. pAfib/pAFL with RVR  4. Left foot pain (?gout), started on colchicine     --Start full anticoagulation Heparin drip, no initial bolus  --Start Sotalol 80mg Q12hr  --Need baseline ECG and 2 hours after each dose of sotalol to monitor for QTc prolongation  --D/C Coreg  --f/u wound and blood cultures, no growth to date   --NPO after MN for possible PPM reimplant tomorrow pending cultures and ID rec  --Plan discussed with CICU team     Addendum: Given pt with somewhat higher risk of infection, would hold off on PPM reimplant until Monday (11/23/20). Continue sotalol to maintain sinus rhythm. QTc stable after 1st dose.       ELVA Morales NP  [id number]

## 2020-11-19 NOTE — PHYSICAL THERAPY INITIAL EVALUATION ADULT - ADDITIONAL COMMENTS
Pt lives in a house w/ her daughter & grandchildren, split-level, no steps to enter & her bedroom & bathroom are on the main level. PTA pt (I) w/ functional mobility & ADL w/o AD.

## 2020-11-19 NOTE — PROGRESS NOTE ADULT - ASSESSMENT
====================ASSESSMENT AND PLAN==============    75y F PMHx HTN, HLD, hypothyroid, afib (on eliquis), sinus bradycardia s/p PPM p/w pacemaker site infection and gout s/p pacemaker removal 11/17 c/b aflutter and hypertension, now on zosyn and vanc.     Plan:  ====================== NEUROLOGY=====================  A&Ox3, no active neuro issues  - Sleep regimen with Temazepam 30mg QHS prn     ==================== RESPIRATORY======================  Comfortable on room air, SpO2 > 97%  -episode of O2 sat to 88% overnight. Patient has known sleep apnea, does not use BIPAP at home.     ====================CARDIOVASCULAR==================  Atrial flutter w/ conversion to NSR with 2nd degree AV block   -PPM removed 11/17  -eisodes of tachy and pat overnight, patient asymptomatic and hemodynamically stable  - S/p metoprolol tartrate 2.5X 2 yesterday   - C/w Coreg 6.25mg q12h for HR and BP   - Hold anticoagulation, plan for EP procedure friday     ===================HEMATOLOGIC/ONC ===================  H/H 13.5/42  - Continue to monitor H&H, PLTs.   - Hold AC for procedure Friday    ===================== RENAL =========================  No active issues  - SCr 0.94 today  - Continue to monitor I/Os, BUN/Cr, and urine output.   - Monitor and replete electrolytes prn, keeping K > 4 and Mg >2.    ==================== GASTROINTESTINAL===================  Tolerating regular diet  - Continue bowel regimen with Miralax and Senna    =======================    ENDOCRINE  =====================  No active issues  - Continue to monitor blood glucose levels    Hypothyroidism  - C/w Synthroid 50mcg     pitavastatin 2 milliGRAM(s) Oral daily    ========================INFECTIOUS DISEASE================  Pocket infection  - Continue with IV Vanco 1g q12h, IV Zosyn 3.375g q8h, with daily vanco troughs (13.2 last night)   - F/u cultures: BCx 11/17 and pocket cultures NGTD  - Temp 98.5,WBC within normal limits at 6.7  - Continue to trend fever curve & WBC    ===================RHEUMATOLOGIC ===================  Acute gout flare  - C/w colchicine 0.6 mg daily until 2-3 days after flare resolves (likely stop today or tomorrow)   - Avoid NSAIDS ISO recent procedure        ====================ASSESSMENT AND PLAN==============    75y F PMHx HTN, HLD, hypothyroid, afib (on eliquis), sinus bradycardia s/p PPM p/w pacemaker site infection and gout s/p pacemaker removal 11/17 c/b aflutter and hypertension, now on zosyn and vanc.     Plan:  ====================== NEUROLOGY=====================  A&Ox3, no active neuro issues  - Sleep regimen with Temazepam 30mg QHS prn     ==================== RESPIRATORY======================  Comfortable on room air, SpO2 > 97%  -episode of O2 sat to 88% overnight. Patient has known sleep apnea, does not use BIPAP at home.     ====================CARDIOVASCULAR==================  Atrial flutter w/ conversion to NSR with 2nd degree AV block   -PPM removed 11/17  -eisodes of tachy and pat overnight, patient asymptomatic and hemodynamically stable  - S/p metoprolol tartrate 2.5X 2 yesterday   - Coreg switched to sotalol this morning due to poor rate control   - started on heparin due to persistent Afib     ===================HEMATOLOGIC/ONC ===================  H/H 13.5/42  - Continue to monitor H&H, PLTs.   - Hold AC for procedure Friday    ===================== RENAL =========================  No active issues  - SCr 0.94 today  - Continue to monitor I/Os, BUN/Cr, and urine output.   - Monitor and replete electrolytes prn, keeping K > 4 and Mg >2.    ==================== GASTROINTESTINAL===================  Tolerating regular diet  - Continue bowel regimen with Miralax and Senna    =======================    ENDOCRINE  =====================  No active issues  - Continue to monitor blood glucose levels    Hypothyroidism  - C/w Synthroid 50mcg     pitavastatin 2 milliGRAM(s) Oral daily    ========================INFECTIOUS DISEASE================  Pocket infection  - Continue with IV Vanco 1g q12h, IV Zosyn 3.375g q8h, with daily vanco troughs (13.2 last night)   - F/u cultures: BCx 11/17 and pocket cultures NGTD  - Temp 98.5,WBC within normal limits at 6.7  - Continue to trend fever curve & WBC    ===================RHEUMATOLOGIC ===================  Acute gout flare  - C/w colchicine 0.6 mg daily until 2-3 days after flare resolves (likely stop today or tomorrow)   - Avoid NSAIDS ISO recent procedure        ====================ASSESSMENT AND PLAN==============    75y F PMHx HTN, HLD, hypothyroid, afib (on eliquis), sinus bradycardia s/p PPM p/w pacemaker site infection and gout s/p pacemaker removal 11/17 c/b aflutter and hypertension, now on zosyn and vanc.     Plan:  ====================== NEUROLOGY=====================  A&Ox3, no active neuro issues  - Sleep regimen with Temazepam 30mg QHS prn     ==================== RESPIRATORY======================  Comfortable on room air, SpO2 > 97%  -episode of O2 sat to 88% overnight. Patient has known sleep apnea, does not use BIPAP at home.     ====================CARDIOVASCULAR==================  Atrial flutter w/ conversion to NSR with 2nd degree AV block   -PPM removed 11/17  -eisodes of tachy and pat overnight, patient asymptomatic and hemodynamically stable  - S/p metoprolol tartrate 2.5X 2 yesterday   - Coreg switched to sotalol this morning due to poor rate control. Monitor w EKGs for QTc prolongation.   - started on heparin due to persistent Afib     ===================HEMATOLOGIC/ONC ===================  H/H 13.5/42  - Continue to monitor H&H, PLTs.   - Hold AC for procedure Friday    ===================== RENAL =========================  No active issues  - SCr 0.94 today  - Continue to monitor I/Os, BUN/Cr, and urine output.   - Monitor and replete electrolytes prn, keeping K > 4 and Mg >2.    ==================== GASTROINTESTINAL===================  Tolerating regular diet  - Continue bowel regimen with Miralax and Senna    =======================    ENDOCRINE  =====================  No active issues  - Continue to monitor blood glucose levels    Hypothyroidism  - C/w Synthroid 50mcg     pitavastatin 2 milliGRAM(s) Oral daily    ========================INFECTIOUS DISEASE================  Pocket infection  - Continue with IV Vanco 1g q12h, IV Zosyn 3.375g q8h, with daily vanco troughs (13.2 last night)   - F/u cultures: BCx 11/17 and pocket cultures NGTD  - Temp 98.5,WBC within normal limits at 6.7  - Continue to trend fever curve & WBC    ===================RHEUMATOLOGIC ===================  Acute gout flare  - C/w colchicine 0.6 mg daily until 2-3 days after flare resolves (likely stop today or tomorrow)   - Avoid NSAIDS ISO recent procedure        ====================ASSESSMENT AND PLAN==============    75y F PMHx HTN, HLD, hypothyroid, afib (on eliquis), sinus bradycardia s/p PPM p/w pacemaker site infection and gout s/p pacemaker removal 11/17 c/b aflutter and hypertension, now on zosyn and vanc.     Plan:  ====================== NEUROLOGY=====================  A&Ox3, no active neuro issues  - Sleep regimen with Temazepam 30mg QHS prn     ==================== RESPIRATORY======================  Comfortable on room air, SpO2 > 97%  -episode of O2 sat to 88% overnight. Patient has known sleep apnea, does not use BIPAP at home.     ====================CARDIOVASCULAR==================  Atrial flutter w/ conversion to NSR with 2nd degree AV block   -PPM removed 11/17  -eisodes of tachy and pat overnight, patient asymptomatic and hemodynamically stable  - S/p metoprolol tartrate 2.5X 2 yesterday   - Coreg switched to sotalol this morning due to poor rate control. Monitor w EKGs for QTc prolongation.   - started on heparin due to persistent Afib     ===================HEMATOLOGIC/ONC ===================  H/H 13.5/42  - Continue to monitor H&H, PLTs.   - Hold AC for procedure Friday    ===================== RENAL =========================  No active issues  - SCr 0.94 today  - Continue to monitor I/Os, BUN/Cr, and urine output.   - Monitor and replete electrolytes prn, keeping K > 4 and Mg >2.    ==================== GASTROINTESTINAL===================  Tolerating regular diet  - Continue bowel regimen with Miralax and Senna    =======================    ENDOCRINE  =====================  No active issues  - Continue to monitor blood glucose levels    Hypothyroidism  - C/w Synthroid 50mcg     pitavastatin 2 milliGRAM(s) Oral daily    ========================INFECTIOUS DISEASE================  Pocket infection  - Continue with IV Vanco 1g q12h, IV Zosyn 3.375g q8h, with daily vanco troughs (13.2 last night)   - F/u cultures: BCx 11/17 and pocket cultures NGTD  - Temp 98.5,WBC within normal limits at 6.7  - Continue to trend fever curve & WBC    -cleared for PPM replacement (in new site) by ID    ===================RHEUMATOLOGIC ===================  Acute gout flare  - C/w colchicine 0.6 mg daily until 2-3 days after flare resolves (likely stop today or tomorrow)   - Avoid NSAIDS ISO recent procedure

## 2020-11-19 NOTE — DIETITIAN INITIAL EVALUATION ADULT. - PROBLEM SELECTOR PLAN 1
likely PPM site infection given recent instrumentation, insertion of new wire. Incision site is erythematous with reportedly purulent drainage but pt not meeting sepsis criteria. s/p clindamycin 3d, levofloxacin 5d. Now awaiting revision per EP.   - EP following, plan for PPM removal/exchange 11/17  - f/u blood cx  - start vanc/zosyn (11/16- )  - ?f/u EP recs for need for NEVILLE to r/o endocarditis  - trend WBC, fevers

## 2020-11-19 NOTE — PHYSICAL THERAPY INITIAL EVALUATION ADULT - GENERAL OBSERVATIONS, REHAB EVAL
Pt rec'ed reclined in bedside chair, +tele, +IV, +wound VAC to R anterior chest surgical site, marck PT WC eval & functional assessment well w/o adverse reaction.

## 2020-11-19 NOTE — DIETITIAN INITIAL EVALUATION ADULT. - PERTINENT LABORATORY DATA
Labs: 11-19 @ 04:58: Sodium 139, Potassium 3.9, Calcium 9.1, Magnesium 2.1, Phosphorus 3.0, BUN 16, Creatinine 0.94, Glucose 127<H>, Alk Phos --, ALT/SGPT --, AST/SGOT --, Albumin --, Prealbumin --, Total Bilirubin --, Hemoglobin 13.5, Hematocrit 42.0, Ferritin --, C-Reactive Protein --, Creatine Kinase <<27>      Triglycerides, Serum: 238 mg/dL (11-18-20 @ 06:32)

## 2020-11-19 NOTE — DIETITIAN INITIAL EVALUATION ADULT. - PROBLEM SELECTOR PLAN 4
- pt held eliquis and dilt doses (last taken 11/13) in anticipation of upcoming procedure  - holding AC for cardiac procedure per EP recs  - c/w digoxin, coreg  - resume eliquis and dilt once cleared by cards

## 2020-11-19 NOTE — PROGRESS NOTE ADULT - ATTENDING COMMENTS
I have personally seen, examined and participated in the care of this patient. I have reviewed all pertinent clinical information, including history, physical exam, plan and the resident's note. I agree with the resident's note with the following additions:    Infected pacemaker pocket s/p system explant complicated by fib/flutter with RVR and hypertension  Sick sinus syndrome, alternating between fib/flutter with RVR and sinus rhythm 50s-60s, acceptable BPs   On Coreg, will transition to Sotalol per EP  Pacemaker re-implant tomorrow  O2 sats mid to high 90s on room air  Regular diet  Normal renal function  H/H normal on Heparin drip for fib/flutter   Afebrile, Clindamycin/Levofloxacin as outpatient, now on Vancomycin and Zosyn (11/17- ); cultures negative, wound vac in place  Sugars controlled  No central access I have personally seen, examined and participated in the care of this patient. I have reviewed all pertinent clinical information, including history, physical exam, plan and the resident's note. I agree with the resident's note with the following additions:    Infected pacemaker pocket s/p system explant complicated by fib/flutter with RVR and hypertension  Sick sinus syndrome, alternating between fib/flutter with RVR and sinus rhythm 50s-60s, acceptable BPs   On Coreg, will transition to Sotalol per EP  Pacemaker re-implant tomorrow - cleared by ID  O2 sats mid to high 90s on room air  Regular diet  Normal renal function  H/H normal on Heparin drip for fib/flutter   Afebrile, Clindamycin/Levofloxacin as outpatient, now on Vancomycin and Zosyn (11/17- ); cultures negative, wound vac in place  Sugars controlled  No central access

## 2020-11-19 NOTE — PROGRESS NOTE ADULT - SUBJECTIVE AND OBJECTIVE BOX
24H hour events: Patient without complaints, overnight she's back and forth in Afib/Aflutter RVR and sinus pat, currently in Atrial flutter with VHR in the 70's      MEDICATIONS:  coreg 6.25mg Oral two times a day  piperacillin/tazobactam IVPB.. 3.375 Gram(s) IV Intermittent every 8 hours  vancomycin  IVPB 1000 milliGRAM(s) IV Intermittent every 12 hours  temazepam 30 milliGRAM(s) Oral at bedtime PRN  polyethylene glycol 3350 17 Gram(s) Oral every 24 hours  senna 2 Tablet(s) Oral at bedtime  colchicine 0.6 milliGRAM(s) Oral daily  levothyroxine 50 MICROGram(s) Oral <User Schedule>  levothyroxine 75 MICROGram(s) Oral <User Schedule>  pitavastatin 2 milliGRAM(s) Oral daily    benzocaine 15 mG/menthol 3.6 mG (Sugar-Free) Lozenge 1 Lozenge Oral four times a day PRN  chlorhexidine 2% Cloths 1 Application(s) Topical at bedtime      REVIEW OF SYSTEMS:  Complete 10point ROS negative.    PHYSICAL EXAM:  T(C): 36.7 (11-19-20 @ 07:00), Max: 37.5 (11-18-20 @ 19:00)  HR: 92 (11-19-20 @ 08:00) (48 - 148)  BP: 93/67 (11-19-20 @ 08:00) (93/67 - 168/72)  RR: 129 (11-19-20 @ 08:00) (12 - 129)  SpO2: 94% (11-19-20 @ 07:00) (92% - 97%)  Wt(kg): --  I&O's Summary    18 Nov 2020 07:01  -  19 Nov 2020 07:00  --------------------------------------------------------  IN: 1760 mL / OUT: 650 mL / NET: 1110 mL    19 Nov 2020 07:01  -  19 Nov 2020 09:57  --------------------------------------------------------  IN: 179 mL / OUT: 0 mL / NET: 179 mL      Appearance: NAD, OOB sitting up in chair 	  Cardiovascular: Normal S1 S2, No JVD, No murmurs  Respiratory: Lungs clear to auscultation	  Psychiatry: A & O x 3, Mood & affect appropriate  Gastrointestinal:  Soft, Non-tender, + BS	  Skin: Right chest wall wound vac site C/D/I, Right groin surgical incision site C/D/I without hematoma 	  Extremities: Normal range of motion, No clubbing, cyanosis or edema  Vascular: Peripheral pulses palpable 2+ bilaterally        LABS:	 	    CBC Full  -  ( 19 Nov 2020 04:58 )  WBC Count : 6.70 K/uL  Hemoglobin : 13.5 g/dL  Hematocrit : 42.0 %  Platelet Count - Automated : 284 K/uL  Mean Cell Volume : 93.1 fl  Mean Cell Hemoglobin : 29.9 pg  Mean Cell Hemoglobin Concentration : 32.1 gm/dL  Auto Neutrophil # : x  Auto Lymphocyte # : x  Auto Monocyte # : x  Auto Eosinophil # : x  Auto Basophil # : x  Auto Neutrophil % : x  Auto Lymphocyte % : x  Auto Monocyte % : x  Auto Eosinophil % : x  Auto Basophil % : x    11-19    139  |  100  |  16  ----------------------------<  127<H>  3.9   |  25  |  0.94  11-18    143  |  103  |  18  ----------------------------<  127<H>  3.8   |  26  |  0.95    Ca    9.1      19 Nov 2020 04:58  Ca    9.1      18 Nov 2020 04:52  Phos  3.0     11-19  Phos  4.3     11-18  Mg     2.1     11-19  Mg     2.3     11-18    TPro  6.0  /  Alb  3.6  /  TBili  0.7  /  DBili  x   /  AST  16  /  ALT  13  /  AlkPhos  68  11-18  TPro  6.4  /  Alb  3.7  /  TBili  0.7  /  DBili  x   /  AST  15  /  ALT  11  /  AlkPhos  73  11-17    Culture - Surgical Swab (11.18.20 @ 01:13)    Specimen Source: .Surgical Swab pacemaker podul    Culture Results:   No growth    Culture - Fungal, Other (11.18.20 @ 01:13)    Specimen Source: .Other Other    Culture Results:   Testing in progress    Culture - Acid Fast - Tissue w/Smear (11.18.20 @ 00:39)    Specimen Source: .Tissue Other    Acid Fast Bacilli Smear:   No acid fast bacilli seen by fluorochrome stain    Culture - Tissue with Gram Stain (11.18.20 @ 00:39)    Gram Stain:   No polymorphonuclear cells seen per low power field  No organisms seen per oil power field    Specimen Source: .Tissue Other    Culture Results:   No growth to date.    Culture - Tissue with Gram Stain (11.18.20 @ 00:39)    Gram Stain:   No polymorphonuclear cells seen per low power field  No organisms seen per oil power field    Specimen Source: .Tissue Other    Culture Results:   No growth to date.    Culture - Blood (11.17.20 @ 04:04)    Specimen Source: .Blood Blood-Peripheral    Culture Results:   No growth to date.        TELEMETRY: overnight she's back and forth in Afib/Aflutter RVR and sinus pat, currently in Atrial flutter with VHR in the 70's

## 2020-11-19 NOTE — PHYSICAL THERAPY INITIAL EVALUATION ADULT - MODALITIES TREATMENT COMMENTS
PT WC consult for wound VAC management. Pt rec'ed reclined in bedside chair, +tele, +IV, +wound VAC to R anterior chest surgical site, marck PT WC eval & functional assessment well w/o adverse reaction. Wound rec'ed C/D/I, no odor, no purulence, no erythema. Wound measuring 7.5cm x 3.0cm x 2.5cm w/ undermining from 12:00-5:00 w/ a greatest depth of 2.0cm at 3:00. Noted some granulation in wound bed. Wound cleansed w/ NS, cavilon to periwound, black granufoam, good seal 125mmHg, continuous.

## 2020-11-19 NOTE — PHYSICAL THERAPY INITIAL EVALUATION ADULT - PERTINENT HX OF CURRENT PROBLEM, REHAB EVAL
75y F PMHx HTN, HLD, hypothyroid, afib (on eliquis), sinus bradycardia s/p PPM p/w pacemaker site infection & toe pain. Now s/p pacemaker & lead extraction w/ wound VAC placement in OR 11/17/20. PT WC consult for wound VAC management.

## 2020-11-19 NOTE — PROGRESS NOTE ADULT - ASSESSMENT
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 Moore for sinus bradycardia to 29, otherwise asymptomatic  She was advised to get the PPM evaluated and saw a Dr. Kent who noted one of the wires was loose  On 11/5, he elected to put in a new wire and leave the loose wire in  Patient with discharge from site and pain  No fevers, no leukocytosis  CXR clear, ppm in place, reviewed personally  Given discharge from site, high concern for pocket infection--appreciate EP procedure, removal of PPM  Overall,  1) Suspected PPM infection  - Discharge from site in setting of recent device manipulation; now s/p explant PPM; BCX NGTD  - Continue Vanco 1g q 12 (monitor levels)  - Zosyn 3.375g q 8  - F/U pending cultures/explant culture  - Wound care per EP  2) L 1st toe pain (gout?)  - Further care per primary team    Team planning for further placement of PPM. Note prior PPM has been removed, no fevers, no leukocytosis, BCX NGTD, patient clinically well. Patient would still have a somewhat higher risk of infection compared to a patient who has not had a recent PPM infection, however, it would be reasonable to place new PPM in an alternate location as long as no signs of local infection at placement site starting on 11/20/20.    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 Moore for sinus bradycardia to 29, otherwise asymptomatic  She was advised to get the PPM evaluated and saw a Dr. Kent who noted one of the wires was loose  On 11/5, he elected to put in a new wire and leave the loose wire in  Patient with discharge from site and pain  No fevers, no leukocytosis  CXR clear, ppm in place, reviewed personally  Given discharge from site, high concern for pocket infection--appreciate EP procedure, removal of PPM  Overall,  1) Suspected PPM infection  - Discharge from site in setting of recent device manipulation; now s/p explant PPM; BCX NGTD  - Continue Vanco 1g q 12 (monitor levels)  - Zosyn 3.375g q 8  - F/U pending cultures/explant culture  - Wound care per EP  2) L 1st toe pain (gout?)  - Further care per primary team    Team planning for further placement of PPM. Note prior PPM has been removed, no fevers, no leukocytosis, BCX NGTD, patient clinically well. Patient would still have a somewhat higher risk of infection compared to a patient who has not had a recent PPM infection, however, if more urgently indicated, it would be reasonable to place new PPM in an alternate location starting on 11/20/20, as long as no new signs of sepsis or signs of local infection at placement site. (Discussed with EP team)    Harvinder Gamez MD  Pager [phone]  After 5pm and on weekends call [phone]

## 2020-11-19 NOTE — DIETITIAN INITIAL EVALUATION ADULT. - REASON FOR ADMISSION
Per chart: 75y F PMHx HTN, HLD, hypothyroid, afib (on eliquis), sinus bradycardia s/p PPM p/w pacemaker site infection and gout s/p pacemaker removal 11/17 c/b aflutter and hypertension, now on zosyn and vanc.

## 2020-11-19 NOTE — DIETITIAN INITIAL EVALUATION ADULT. - PROBLEM SELECTOR PLAN 3
Pt currently asymtpomatic, in a-paced rhythm  - ?sinus node dysfxn  - f/u ep recs and need for interrogation

## 2020-11-19 NOTE — DIETITIAN INITIAL EVALUATION ADULT. - ADD RECOMMEND
Reinforce nutrition education PRN. Monitor tolerance to diet prescription, nutritional intake, GI function, weight trends, labs and skin integrity.

## 2020-11-19 NOTE — DIETITIAN INITIAL EVALUATION ADULT. - PROBLEM SELECTOR PLAN 2
L first metatarsal joint pain with erythema, possible gout vs pseudogout. DDx includes less likely endocarditis given infected pacemaker pocket, recent instrumentation- pt afebrile, vitals otherwise stable, no leukocytosis, no murmur on exam, and no SOB  - pain regimen: tylenol 650 mg q6h prn  - ?f/u EP recs for [facility] to r/o endocarditis  - rheum consult in AM for further eval of toe  - percocet prn for pain

## 2020-11-19 NOTE — PROGRESS NOTE ADULT - SUBJECTIVE AND OBJECTIVE BOX
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 She had a R sided dual chamber PPM placed 2.5 years ago by a Dr. Moore for sinus bradycardia to 29, otherwise asymptomatic. She was without complications until 10/30 when her cardiology office [facility] received a transmission from her PPM that showed "something wrong". She was advised to get the PPM evaluated and saw a Dr. Kent who noted one of the wires was loose. On11/5, he elected to put in a new wire and leave the loose wire in. However, a few days after that procedure the pt started noticing purulent discharge from the pacemaker incision site and presented to [facility]. She was prescribed a 3 day course of clindamycin but the purulent drainage did not resolved. She then called went back to [place] and has since completed 5 out of 20 day course of levofloxacin. On 11/14 she noticed acute sharp L 1st toe foot pain. denies trauma, bleeding, or discharge. Pt reports no hx of gout or pseudogout. She called Dr. Moore's office who recommended she come to [facility] for PPM removal and further evaluation. Pt still reports drainage from the ppm incision site.    Of note, pt was advised to stop taking her eliquis and diltiazem on 11/13 as she may get a procedure so she has not taken these medications since 11/13 pm.     ED course:   afebrile, HR 68, /102, RR 18, 97% on RA  Received CXR (16 Nov 2020 23:02)      Overnight events:   -coreg increased to 6.25 BID  -episodes of HR to 150s, Aflutter, as well as pat to 40s. Patient asymptomatic during episodes  -Desatted to 88%, placed on 2L N (hx sleep apnea)     REVIEW OF SYSTEMS:    CONSTITUTIONAL: No weakness, fevers or chills  EYES/ENT: No visual changes;  No vertigo or throat pain   NECK: No pain or stiffness  RESPIRATORY: No cough, wheezing, hemoptysis; No shortness of breath  CARDIOVASCULAR: No chest pain or palpitations  GASTROINTESTINAL: No abdominal or epigastric pain. No nausea, vomiting, or hematemesis; No diarrhea or constipation. No melena or hematochezia.  GENITOURINARY: No dysuria, frequency or hematuria  NEUROLOGICAL: No numbness or weakness  SKIN: No itching, rashes      ICU Vital Signs Last 24 Hrs  T(C): 37.1 (19 Nov 2020 03:00), Max: 37.5 (18 Nov 2020 19:00)  T(F): 98.8 (19 Nov 2020 03:00), Max: 99.5 (18 Nov 2020 19:00)  HR: 52 (19 Nov 2020 07:00) (48 - 148)  BP: 101/67 (19 Nov 2020 07:00) (98/53 - 168/72)  BP(mean): 83 (19 Nov 2020 07:00) (72 - 115)  ABP: --  ABP(mean): --  RR: 12 (19 Nov 2020 07:00) (12 - 24)  SpO2: 94% (19 Nov 2020 07:00) (92% - 98%)      18 Nov 2020 07:01  -  19 Nov 2020 07:00  --------------------------------------------------------  IN: 1760 mL / OUT: 550 mL / NET: 1210 mL      PHYSICAL EXAM:  GENERAL: No acute distress, well-developed  HEAD:  Atraumatic, Normocephalic  EYES: EOMI, PERRLA, conjunctiva and sclera clear  NECK: Supple, no lymphadenopathy, no JVD  CHEST/LUNG: CTAB; No wheezes, rales, or rhonchi  HEART: Regular rate and rhythm. Normal S1/S2. No murmurs, rubs, or gallops  ABDOMEN: Soft, non-tender, non-distended; normal bowel sounds, no organomegaly  EXTREMITIES:  2+ peripheral pulses b/l, No clubbing, cyanosis, or edema. Minimal tenderness at L great toe   NEUROLOGY: A&O x 3, no focal deficits  SKIN: Wound vac on R upper chest, clean and dry. No erythema     ============================I/O===========================   I&O's Detail    18 Nov 2020 07:01  -  19 Nov 2020 07:00  --------------------------------------------------------  IN:    IV PiggyBack: 800 mL    Oral Fluid: 960 mL  Total IN: 1760 mL    OUT:    VAC (Vacuum Assisted Closure) System (mL): 0 mL    Voided (mL): 550 mL  Total OUT: 550 mL    Total NET: 1210 mL        ============================ LABS =========================                        13.5   6.70  )-----------( 284      ( 19 Nov 2020 04:58 )             42.0     11-19    139  |  100  |  16  ----------------------------<  127<H>  3.9   |  25  |  0.94    Ca    9.1      19 Nov 2020 04:58  Phos  3.0     11-19  Mg     2.1     11-19    TPro  6.0  /  Alb  3.6  /  TBili  0.7  /  DBili  x   /  AST  16  /  ALT  13  /  AlkPhos  68  11-18      LIVER FUNCTIONS - ( 18 Nov 2020 04:52 )  Alb: 3.6 g/dL / Pro: 6.0 g/dL / ALK PHOS: 68 U/L / ALT: 13 U/L / AST: 16 U/L / GGT: x           PT/INR - ( 19 Nov 2020 04:58 )   PT: 13.3 sec;   INR: 1.11 ratio         PTT - ( 19 Nov 2020 04:58 )  PTT:30.6 sec        ======================Micro/Rad/Cardio=================  Telemtry: Reviewed   EKG: Reviewed  CXR: Reviewed  Culture: Reviewed     ======================================================  PAST MEDICAL & SURGICAL HISTORY:  Hypothyroid    Afib    HLD (hyperlipidemia)    HTN (hypertension)    History of appendectomy    H/O cardiac pacemaker       CIRILO ROLDAN  MRN-[id number]  Patient is a 75y old  Female who presents with a chief complaint of pacemaker site infection (18 Nov 2020 19:32)    HPI:  75y F PMHx HTN, HLD, hypothyroid, afib (on eliquis), 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 She had a R sided dual chamber PPM placed 2.5 years ago by a Dr. Moore for sinus bradycardia to 29, otherwise asymptomatic. She was without complications until 10/30 when her cardiology office [facility] received a transmission from her PPM that showed "something wrong". She was advised to get the PPM evaluated and saw a Dr. Kent who noted one of the wires was loose. On11/5, he elected to put in a new wire and leave the loose wire in. However, a few days after that procedure the pt started noticing purulent discharge from the pacemaker incision site and presented to [facility]. She was prescribed a 3 day course of clindamycin but the purulent drainage did not resolved. She then called went back to [place] and has since completed 5 out of 20 day course of levofloxacin. On 11/14 she noticed acute sharp L 1st toe foot pain. denies trauma, bleeding, or discharge. Pt reports no hx of gout or pseudogout. She called Dr. Moore's office who recommended she come to [facility] for PPM removal and further evaluation. Pt still reports drainage from the ppm incision site.    Of note, pt was advised to stop taking her eliquis and diltiazem on 11/13 as she may get a procedure so she has not taken these medications since 11/13 pm.     ED course:   afebrile, HR 68, /102, RR 18, 97% on RA  Received CXR (16 Nov 2020 23:02)      Overnight events:   -coreg increased to 6.25 BID last night, switched to Sotalol this morning    -episodes of HR to 150s, Aflutter, as well as pat to 40s. Patient asymptomatic during episodes  -Desatted to 88%, placed on 2L N (hx sleep apnea)   -started on heparin     REVIEW OF SYSTEMS:    CONSTITUTIONAL: No weakness, fevers or chills  EYES/ENT: No visual changes;  No vertigo or throat pain   NECK: No pain or stiffness  RESPIRATORY: No cough, wheezing, hemoptysis; No shortness of breath  CARDIOVASCULAR: No chest pain or palpitations  GASTROINTESTINAL: No abdominal or epigastric pain. No nausea, vomiting, or hematemesis; No diarrhea or constipation. No melena or hematochezia.  GENITOURINARY: No dysuria, frequency or hematuria  NEUROLOGICAL: No numbness or weakness  SKIN: No itching, rashes      ICU Vital Signs Last 24 Hrs  T(C): 37.1 (19 Nov 2020 03:00), Max: 37.5 (18 Nov 2020 19:00)  T(F): 98.8 (19 Nov 2020 03:00), Max: 99.5 (18 Nov 2020 19:00)  HR: 52 (19 Nov 2020 07:00) (48 - 148)  BP: 101/67 (19 Nov 2020 07:00) (98/53 - 168/72)  BP(mean): 83 (19 Nov 2020 07:00) (72 - 115)  ABP: --  ABP(mean): --  RR: 12 (19 Nov 2020 07:00) (12 - 24)  SpO2: 94% (19 Nov 2020 07:00) (92% - 98%)      18 Nov 2020 07:01  -  19 Nov 2020 07:00  --------------------------------------------------------  IN: 1760 mL / OUT: 550 mL / NET: 1210 mL      PHYSICAL EXAM:  GENERAL: No acute distress, well-developed  HEAD:  Atraumatic, Normocephalic  EYES: EOMI, PERRLA, conjunctiva and sclera clear  NECK: Supple, no lymphadenopathy, no JVD  CHEST/LUNG: CTAB; No wheezes, rales, or rhonchi  HEART: Regular rate and rhythm. Normal S1/S2. No murmurs, rubs, or gallops  ABDOMEN: Soft, non-tender, non-distended; normal bowel sounds, no organomegaly  EXTREMITIES:  2+ peripheral pulses b/l, No clubbing, cyanosis, or edema. Minimal tenderness at L great toe   NEUROLOGY: A&O x 3, no focal deficits  SKIN: Wound vac on R upper chest, clean and dry. No erythema     ============================I/O===========================   I&O's Detail    18 Nov 2020 07:01  -  19 Nov 2020 07:00  --------------------------------------------------------  IN:    IV PiggyBack: 800 mL    Oral Fluid: 960 mL  Total IN: 1760 mL    OUT:    VAC (Vacuum Assisted Closure) System (mL): 0 mL    Voided (mL): 550 mL  Total OUT: 550 mL    Total NET: 1210 mL        ============================ LABS =========================                        13.5   6.70  )-----------( 284      ( 19 Nov 2020 04:58 )             42.0     11-19    139  |  100  |  16  ----------------------------<  127<H>  3.9   |  25  |  0.94    Ca    9.1      19 Nov 2020 04:58  Phos  3.0     11-19  Mg     2.1     11-19    TPro  6.0  /  Alb  3.6  /  TBili  0.7  /  DBili  x   /  AST  16  /  ALT  13  /  AlkPhos  68  11-18      LIVER FUNCTIONS - ( 18 Nov 2020 04:52 )  Alb: 3.6 g/dL / Pro: 6.0 g/dL / ALK PHOS: 68 U/L / ALT: 13 U/L / AST: 16 U/L / GGT: x           PT/INR - ( 19 Nov 2020 04:58 )   PT: 13.3 sec;   INR: 1.11 ratio         PTT - ( 19 Nov 2020 04:58 )  PTT:30.6 sec        ======================Micro/Rad/Cardio=================  Telemtry: Reviewed   EKG: Reviewed  CXR: Reviewed  Culture: Reviewed     ======================================================  PAST MEDICAL & SURGICAL HISTORY:  Hypothyroid    Afib    HLD (hyperlipidemia)    HTN (hypertension)    History of appendectomy    H/O cardiac pacemaker

## 2020-11-19 NOTE — DIETITIAN INITIAL EVALUATION ADULT. - OTHER INFO
Reports UBW of 250 pounds in May and endorses mixed intentional/unintentional weight loss since then to more recent UBW of 200 pounds (consistent with dosing weight 199 pounds on 11/17). Attributes weight loss to calorie-controlled diet during recent hospitalizations. Pt is pleased with weight loss and hopes to lose more. RD reviewed increased nutrient needs to promote wound healing & emphasized importance of protein intake to promote wound healing and maintain lean body mass maintenance. Pt declined oral nutrition supplements at this time. RD also reviewed heart healthy eating, specifying sources of lean proteins, healthy fats, and fiber to include into diet. Pt with questions about gout nutrition therapy-discussed low purine diet. Written materials provided on all discussed topics. Pt denies nausea/vomiting, diarrhea/constipation or chewing/swallowing problems. Last BM today, no issues. Eating well in-house, completing % of meals provided.

## 2020-11-19 NOTE — PROGRESS NOTE ADULT - SUBJECTIVE AND OBJECTIVE BOX
CC: F/U for PPM infection    Saw/spoke to patient. No fevers, no chills. No new complaints. Unchanged.    Allergies  latex (Rash)  penicillin (Rash (Mild))    ANTIMICROBIALS:  piperacillin/tazobactam IVPB.. 3.375 every 8 hours  vancomycin  IVPB 1000 every 12 hours    PE:    Vital Signs Last 24 Hrs  T(C): 36.8 (19 Nov 2020 11:00), Max: 37.5 (18 Nov 2020 19:00)  T(F): 98.2 (19 Nov 2020 11:00), Max: 99.5 (18 Nov 2020 19:00)  HR: 74 (19 Nov 2020 11:00) (48 - 148)  BP: 114/68 (19 Nov 2020 11:00) (93/67 - 168/72)  BP(mean): 85 (19 Nov 2020 11:00) (74 - 110)  RR: 13 (19 Nov 2020 10:00) (12 - 24)  SpO2: 94% (19 Nov 2020 07:00) (94% - 97%)    Gen: AOx3, NAD, non-toxic  CV: S1+S2 normal, nontachycardic; wound vac to PPM removal site  Resp: Clear bilat, no resp distress, no crackles/wheezes  Abd: Soft, nontender, +BS  Ext: No LE edema, no wounds    LABS:                        13.5   6.70  )-----------( 284      ( 19 Nov 2020 04:58 )             42.0     11-19    139  |  100  |  16  ----------------------------<  127<H>  3.9   |  25  |  0.94    Ca    9.1      19 Nov 2020 04:58  Phos  3.0     11-19  Mg     2.1     11-19    TPro  6.0  /  Alb  3.6  /  TBili  0.7  /  DBili  x   /  AST  16  /  ALT  13  /  AlkPhos  68  11-18    MICROBIOLOGY:  Vancomycin Level, Random: 13.2 ug/mL (11-19-20 @ 04:58)    .Other Other  11-18-20   Testing in progress     .Tissue Other  11-18-20   No growth to date.  --    No polymorphonuclear cells seen per low power field  No organisms seen per oil power field    .Blood Blood-Peripheral  11-17-20   No growth to date.     RADIOLOGY:    CXR 11/18:    FINDINGS:    Lines/Tubes: In expected locations    Heart and mediastinum:  Unchanged in appearance.    Lungs, pleura, and airways: Clear lungs    Bones and soft tissues: The bones and soft tissues are unchanged.    Impression:    Clear lungs    History states "line placement "however no new catheter or line is identified.

## 2020-11-20 DIAGNOSIS — R19.7 DIARRHEA, UNSPECIFIED: ICD-10-CM

## 2020-11-20 LAB
ANION GAP SERPL CALC-SCNC: 13 MMOL/L — SIGNIFICANT CHANGE UP (ref 5–17)
ANION GAP SERPL CALC-SCNC: 14 MMOL/L — SIGNIFICANT CHANGE UP (ref 5–17)
APPEARANCE UR: ABNORMAL
APTT BLD: 118.8 SEC — HIGH (ref 27.5–35.5)
APTT BLD: 141.9 SEC — CRITICAL HIGH (ref 27.5–35.5)
APTT BLD: 71.5 SEC — HIGH (ref 27.5–35.5)
BACTERIA # UR AUTO: ABNORMAL
BILIRUB UR-MCNC: NEGATIVE — SIGNIFICANT CHANGE UP
BUN SERPL-MCNC: 26 MG/DL — HIGH (ref 7–23)
BUN SERPL-MCNC: 28 MG/DL — HIGH (ref 7–23)
C DIFF GDH STL QL: NEGATIVE — SIGNIFICANT CHANGE UP
C DIFF GDH STL QL: SIGNIFICANT CHANGE UP
CALCIUM SERPL-MCNC: 9.4 MG/DL — SIGNIFICANT CHANGE UP (ref 8.4–10.5)
CALCIUM SERPL-MCNC: 9.6 MG/DL — SIGNIFICANT CHANGE UP (ref 8.4–10.5)
CHLORIDE SERPL-SCNC: 101 MMOL/L — SIGNIFICANT CHANGE UP (ref 96–108)
CHLORIDE SERPL-SCNC: 102 MMOL/L — SIGNIFICANT CHANGE UP (ref 96–108)
CO2 SERPL-SCNC: 23 MMOL/L — SIGNIFICANT CHANGE UP (ref 22–31)
CO2 SERPL-SCNC: 26 MMOL/L — SIGNIFICANT CHANGE UP (ref 22–31)
COLOR SPEC: YELLOW — SIGNIFICANT CHANGE UP
CREAT ?TM UR-MCNC: 166 MG/DL — SIGNIFICANT CHANGE UP
CREAT SERPL-MCNC: 1.86 MG/DL — HIGH (ref 0.5–1.3)
CREAT SERPL-MCNC: 1.95 MG/DL — HIGH (ref 0.5–1.3)
DIFF PNL FLD: ABNORMAL
EPI CELLS # UR: 6 /HPF — HIGH
GLUCOSE SERPL-MCNC: 111 MG/DL — HIGH (ref 70–99)
GLUCOSE SERPL-MCNC: 140 MG/DL — HIGH (ref 70–99)
GLUCOSE UR QL: NEGATIVE — SIGNIFICANT CHANGE UP
HCT VFR BLD CALC: 42.6 % — SIGNIFICANT CHANGE UP (ref 34.5–45)
HGB BLD-MCNC: 13.9 G/DL — SIGNIFICANT CHANGE UP (ref 11.5–15.5)
HYALINE CASTS # UR AUTO: 0 /LPF — SIGNIFICANT CHANGE UP (ref 0–7)
INR BLD: 1.13 RATIO — SIGNIFICANT CHANGE UP (ref 0.88–1.16)
KETONES UR-MCNC: NEGATIVE — SIGNIFICANT CHANGE UP
LEUKOCYTE ESTERASE UR-ACNC: NEGATIVE — SIGNIFICANT CHANGE UP
MAGNESIUM SERPL-MCNC: 2.2 MG/DL — SIGNIFICANT CHANGE UP (ref 1.6–2.6)
MCHC RBC-ENTMCNC: 30.1 PG — SIGNIFICANT CHANGE UP (ref 27–34)
MCHC RBC-ENTMCNC: 32.6 GM/DL — SIGNIFICANT CHANGE UP (ref 32–36)
MCV RBC AUTO: 92.2 FL — SIGNIFICANT CHANGE UP (ref 80–100)
NITRITE UR-MCNC: NEGATIVE — SIGNIFICANT CHANGE UP
NRBC # BLD: 0 /100 WBCS — SIGNIFICANT CHANGE UP (ref 0–0)
PH UR: 5.5 — SIGNIFICANT CHANGE UP (ref 5–8)
PHOSPHATE SERPL-MCNC: 3.9 MG/DL — SIGNIFICANT CHANGE UP (ref 2.5–4.5)
PLATELET # BLD AUTO: 302 K/UL — SIGNIFICANT CHANGE UP (ref 150–400)
POTASSIUM SERPL-MCNC: 3.7 MMOL/L — SIGNIFICANT CHANGE UP (ref 3.5–5.3)
POTASSIUM SERPL-MCNC: 3.8 MMOL/L — SIGNIFICANT CHANGE UP (ref 3.5–5.3)
POTASSIUM SERPL-SCNC: 3.7 MMOL/L — SIGNIFICANT CHANGE UP (ref 3.5–5.3)
POTASSIUM SERPL-SCNC: 3.8 MMOL/L — SIGNIFICANT CHANGE UP (ref 3.5–5.3)
PROT UR-MCNC: ABNORMAL
PROTHROM AB SERPL-ACNC: 13.5 SEC — SIGNIFICANT CHANGE UP (ref 10.6–13.6)
RBC # BLD: 4.62 M/UL — SIGNIFICANT CHANGE UP (ref 3.8–5.2)
RBC # FLD: 13 % — SIGNIFICANT CHANGE UP (ref 10.3–14.5)
RBC CASTS # UR COMP ASSIST: 6 /HPF — HIGH (ref 0–4)
SODIUM SERPL-SCNC: 139 MMOL/L — SIGNIFICANT CHANGE UP (ref 135–145)
SODIUM SERPL-SCNC: 140 MMOL/L — SIGNIFICANT CHANGE UP (ref 135–145)
SODIUM UR-SCNC: <35 MMOL/L — SIGNIFICANT CHANGE UP
SP GR SPEC: 1.02 — SIGNIFICANT CHANGE UP (ref 1.01–1.02)
UROBILINOGEN FLD QL: NEGATIVE — SIGNIFICANT CHANGE UP
WBC # BLD: 8.57 K/UL — SIGNIFICANT CHANGE UP (ref 3.8–10.5)
WBC # FLD AUTO: 8.57 K/UL — SIGNIFICANT CHANGE UP (ref 3.8–10.5)
WBC UR QL: 1 /HPF — SIGNIFICANT CHANGE UP (ref 0–5)

## 2020-11-20 PROCEDURE — 99233 SBSQ HOSP IP/OBS HIGH 50: CPT

## 2020-11-20 PROCEDURE — 99024 POSTOP FOLLOW-UP VISIT: CPT

## 2020-11-20 PROCEDURE — 93010 ELECTROCARDIOGRAM REPORT: CPT | Mod: 77

## 2020-11-20 PROCEDURE — 93010 ELECTROCARDIOGRAM REPORT: CPT

## 2020-11-20 PROCEDURE — 99233 SBSQ HOSP IP/OBS HIGH 50: CPT | Mod: GC

## 2020-11-20 PROCEDURE — 99233 SBSQ HOSP IP/OBS HIGH 50: CPT | Mod: GC,25

## 2020-11-20 RX ORDER — HEPARIN SODIUM 5000 [USP'U]/ML
1400 INJECTION INTRAVENOUS; SUBCUTANEOUS
Qty: 25000 | Refills: 0 | Status: DISCONTINUED | OUTPATIENT
Start: 2020-11-20 | End: 2020-11-23

## 2020-11-20 RX ORDER — CEFEPIME 1 G/1
1000 INJECTION, POWDER, FOR SOLUTION INTRAMUSCULAR; INTRAVENOUS EVERY 12 HOURS
Refills: 0 | Status: DISCONTINUED | OUTPATIENT
Start: 2020-11-20 | End: 2020-11-25

## 2020-11-20 RX ORDER — SOTALOL HCL 120 MG
40 TABLET ORAL
Refills: 0 | Status: DISCONTINUED | OUTPATIENT
Start: 2020-11-20 | End: 2020-11-23

## 2020-11-20 RX ORDER — SOTALOL HCL 120 MG
40 TABLET ORAL
Refills: 0 | Status: DISCONTINUED | OUTPATIENT
Start: 2020-11-20 | End: 2020-11-20

## 2020-11-20 RX ORDER — SODIUM CHLORIDE 9 MG/ML
1000 INJECTION, SOLUTION INTRAVENOUS
Refills: 0 | Status: COMPLETED | OUTPATIENT
Start: 2020-11-20 | End: 2020-11-20

## 2020-11-20 RX ORDER — SODIUM CHLORIDE 9 MG/ML
500 INJECTION INTRAMUSCULAR; INTRAVENOUS; SUBCUTANEOUS ONCE
Refills: 0 | Status: COMPLETED | OUTPATIENT
Start: 2020-11-20 | End: 2020-11-20

## 2020-11-20 RX ADMIN — Medication 50 MICROGRAM(S): at 06:13

## 2020-11-20 RX ADMIN — SODIUM CHLORIDE 500 MILLILITER(S): 9 INJECTION INTRAMUSCULAR; INTRAVENOUS; SUBCUTANEOUS at 08:38

## 2020-11-20 RX ADMIN — CHLORHEXIDINE GLUCONATE 1 APPLICATION(S): 213 SOLUTION TOPICAL at 21:11

## 2020-11-20 RX ADMIN — Medication 250 MILLIGRAM(S): at 05:55

## 2020-11-20 RX ADMIN — HEPARIN SODIUM 1200 UNIT(S)/HR: 5000 INJECTION INTRAVENOUS; SUBCUTANEOUS at 18:59

## 2020-11-20 RX ADMIN — HEPARIN SODIUM 1200 UNIT(S)/HR: 5000 INJECTION INTRAVENOUS; SUBCUTANEOUS at 08:05

## 2020-11-20 RX ADMIN — HEPARIN SODIUM 1400 UNIT(S)/HR: 5000 INJECTION INTRAVENOUS; SUBCUTANEOUS at 01:39

## 2020-11-20 RX ADMIN — TEMAZEPAM 30 MILLIGRAM(S): 15 CAPSULE ORAL at 00:10

## 2020-11-20 RX ADMIN — TEMAZEPAM 30 MILLIGRAM(S): 15 CAPSULE ORAL at 23:50

## 2020-11-20 RX ADMIN — CEFEPIME 100 MILLIGRAM(S): 1 INJECTION, POWDER, FOR SOLUTION INTRAMUSCULAR; INTRAVENOUS at 16:57

## 2020-11-20 RX ADMIN — HEPARIN SODIUM 0 UNIT(S)/HR: 5000 INJECTION INTRAVENOUS; SUBCUTANEOUS at 00:22

## 2020-11-20 RX ADMIN — PITAVASTATIN CALCIUM 2 MILLIGRAM(S): 1.04 TABLET, FILM COATED ORAL at 21:10

## 2020-11-20 RX ADMIN — PIPERACILLIN AND TAZOBACTAM 25 GRAM(S): 4; .5 INJECTION, POWDER, LYOPHILIZED, FOR SOLUTION INTRAVENOUS at 01:59

## 2020-11-20 RX ADMIN — SODIUM CHLORIDE 100 MILLILITER(S): 9 INJECTION, SOLUTION INTRAVENOUS at 18:49

## 2020-11-20 RX ADMIN — Medication 40 MILLIGRAM(S): at 18:49

## 2020-11-20 NOTE — PROGRESS NOTE ADULT - ASSESSMENT
====================ASSESSMENT AND PLAN==============      ====================CARDIOVASCULAR==================    Mechaincal Circulatory Support:  [ ] IABP   [ ] Impella 2.5   [ ] Impella CP  Settings:     ==Hemodynamics==     (Date) CVP:      PCWP:        PA S/D:            Cardiac Output:             Cardiac Index:            SVR:    Preload (fluids, diuretics):  Afterload (anti-hypertensives, pressors):  Inotropes:    ==Pump==    Echo Date:  LVEF:                              Regional Wall Motion Abnormaility?:  [ ]Yes   [ ] No, If Yes, Details  Diastolic function:  RV function:   Any change frim prior?: [ ] Yes   [ ] No, If Yes, Details:   Volume status:    ==Coronaries==    Last ischemic workup:   Antiplatelet regimen:   Anticoagulant:   Statin:   Beta blocker:    ==Rhythm==    Current rhythm:  AM EKG Interpretation:   Anti-arrhythmic therapies:   TVP with settings:     sotalol 80 milliGRAM(s) Oral two times a day      ====================== NEUROLOGY=====================  Sedation [ ]Yes   [ ] No  Delirium [ ]Yes   [ ] No    temazepam 30 milliGRAM(s) Oral at bedtime PRN Insomnia    ==================== RESPIRATORY======================  Mechanical Ventilation [ ]    BIPAP [ ]   HFNC [ ]   NC [ ]                 ===================== RENAL =========================    11-19-20 @ 07:01  -  11-20-20 @ 07:00  --------------------------------------------------------  IN: 1921 mL / OUT: 1150 mL / NET: 771 mL    11-20-20 @ 07:01  -  11-20-20 @ 08:31  --------------------------------------------------------  IN: 26 mL / OUT: 0 mL / NET: 26 mL      Renal Replacement Therapy:  [ ] CRRT      [ ] IHD, Last Session:    Fluid removal:     [ ] Diuretic therapy, Regimen:       ==================== GASTROINTESTINAL===================    Diet:  Last BM:   Indication for Stress Ulcer Prophylaxis, [ ] Yes    [ ] No   If Yes, Medication:     sodium chloride 0.9% Bolus 500 milliLiter(s) IV Bolus once    ========================INFECTIOUS DISEASE================  T(C): 36.5 (11-20-20 @ 06:00), Max: 36.8 (11-19-20 @ 11:00)  WBC Count: 8.57 K/uL (11-20-20 @ 06:40)  WBC Count: 8.38 K/uL (11-19-20 @ 16:33)  WBC Count: 6.70 K/uL (11-19-20 @ 04:58)  WBC Count: 10.89 K/uL (11-18-20 @ 04:52)  WBC Count: 9.68 K/uL (11-17-20 @ 23:22)      Culture - Blood (collected 11-17-20 @ 04:04)  Source: .Blood Blood-Peripheral  Preliminary Report (11-18-20 @ 05:00):    No growth to date.        Current Antibiotics/Antifungals with start date:       ===================HEMATOLOGIC/ONC ===================  Hemoglobin: 13.9 g/dL (11-20-20 @ 06:40)  Hemoglobin: 13.6 g/dL (11-19-20 @ 16:33)  Hemoglobin: 13.5 g/dL (11-19-20 @ 04:58)  Hemoglobin: 13.3 g/dL (11-18-20 @ 04:52)  Hemoglobin: 14.0 g/dL (11-17-20 @ 23:22)    Platelet Count - Automated: 302 K/uL (11-20-20 @ 06:40)  Platelet Count - Automated: 289 K/uL (11-19-20 @ 16:33)  Platelet Count - Automated: 284 K/uL (11-19-20 @ 04:58)  Platelet Count - Automated: 306 K/uL (11-18-20 @ 04:52)  Platelet Count - Automated: 302 K/uL (11-17-20 @ 23:22)    Chemical VTE Prophylaxis:  [ ] Lovenox    [ ] SQH   [ ]NA  Systemic Anticogaulation:  [ ] Yes    [ ] No,  If Yes, Medication and Indication:     heparin   Injectable 7500 Unit(s) IV Push every 6 hours PRN For aPTT less than 40  heparin   Injectable 3500 Unit(s) IV Push every 6 hours PRN For aPTT between 40 - 57  heparin  Infusion. 1400 Unit(s)/Hr (14 mL/Hr) IV Continuous <Continuous>    =======================    ENDOCRINE  =====================  Insulin drip  [ ] Yes    [ ] No  Basal Insulin [ ] Yes    [ ] No  Bolus insulin [ ] Yes    [ ] No  Sliding Scale  [ ] Yes    [ ] No  Hgb A1c          colchicine 0.6 milliGRAM(s) Oral daily  levothyroxine 50 MICROGram(s) Oral <User Schedule>  levothyroxine 75 MICROGram(s) Oral <User Schedule>  pitavastatin 2 milliGRAM(s) Oral daily    ======================= LINES/TUBES  =====================  Throckmorton: (Date placed)  Central Line: (Date placed)  HD Catheter: (Date placed)  Arterial Line: (Date placed)  Endotracheal Tube: (Date placed)  Pabon: (Date placed)     Plan:  ====================== NEUROLOGY=====================  A&Ox3, no active neuro issues  - Continue to assess and monitor status as per protocol  - Sleep regimen with Temazepam 30mg QHS prn     ==================== RESPIRATORY======================  Comfortable on room air, SpO2 > 97%  - desats overnight ISO known JUANI     ====================CARDIOVASCULAR==================  Atrial flutter w/ conversion to NSR with 2nd degree AV block   - History of sinus bradycardia to 29 s/p ppm now removed, with wound vac in place   - S/p metoprolol tartrate 25 mg PO in PACU.   - S/P Sotalol 80mg BID for HR and BP. Held this AM as may need renal dosing   -  Baseline EKG, plus EKG x2 hr after each dose of Sotalol to monitor QTc prolongation. QTc last night 507, 480s this AM (sotalol held)   -  heparin gtt    ===================HEMATOLOGIC/ONC ===================  H/H  13.6/42.4  - Continue to monitor H&H, PLTs.   - C/w Heparin infusion   - C/w IVP Heparin according to pTT protocol     ===================== RENAL =========================  No active issues  - SCr 0.94 today  - Continue to monitor I/Os, BUN/Cr, and urine output.   - Monitor and replete electrolytes prn, keeping K > 4 and Mg >2.    ==================== GASTROINTESTINAL===================  - tolerating regular diet  - Continue bowel regimen with Miralax  =======================    ENDOCRINE  =====================  No active issues  - Continue to monitor blood glucose levels    Hypothyroidism  - C/w Synthroid 50mcg, 75mcg    ========================INFECTIOUS DISEASE================  Pocket infection  - Continue with IV Vanco 1g q12h, IV Zosyn 3.375g q8h, with daily vanco troughs  - F/u cultures: BCx 11/17 and pocket cultures NGTD  - Temp 99.5F, WBC within normal limits at 10.89  - Continue to trend fever curve & WBC  - ID okay with alternative PPM placement provided no new s/s of sepsis/ infection at placement site.     ===================RHEUMATOLOGIC ===================  Acute gout flare  - C/w colchicine 0.6 mg daily until 2-3 days after flare resolves  - Avoid NSAIDS ISO recent procedure      Plan:  ====================== NEUROLOGY=====================  A&Ox3, no active neuro issues  - Continue to assess and monitor status as per protocol  - Sleep regimen with Temazepam 30mg QHS prn     ==================== RESPIRATORY======================  Comfortable on room air, SpO2 > 97%  - desats overnight ISO known JUANI     ====================CARDIOVASCULAR==================  Atrial flutter w/ conversion to NSR with 2nd degree AV block   - History of sinus bradycardia to 29 s/p ppm now removed, with wound vac in place   - S/p metoprolol tartrate 25 mg PO in PACU.   - S/P Sotalol 80mg BID for HR and BP. Held this AM as may need renal dosing   -  Baseline EKG, plus EKG x2 hr after each dose of Sotalol to monitor QTc prolongation. QTc last night 507, 480s this AM (sotalol held)   -  heparin gtt    ===================HEMATOLOGIC/ONC ===================  H/H  13.9/42.6  - Continue to monitor H&H, PLTs.   - C/w IVP Heparin according to pTT protocol     ===================== RENAL =========================  HADLEY  - SCr 1.86 today  -unclear etiology: Vanc/Zosyn use vs pre-renal ISO diarrhea/dehydration  -check urine studies, switch Zosyn--> cefepime  -giving 500cc NS bolus  - Continue to monitor I/Os, BUN/Cr, and urine output.   - Monitor and replete electrolytes prn, keeping K > 4 and Mg >2.    ==================== GASTROINTESTINAL===================  - diarrhea  -stopping Miralax   =======================    ENDOCRINE  =====================  No active issues  - Continue to monitor blood glucose levels    Hypothyroidism  - C/w Synthroid 50mcg, 75mcg    ========================INFECTIOUS DISEASE================  Pocket infection  - Switching Zosyn---> cefepime ISO HADLEY   -redose Vanc given HADLEY   - F/u cultures: BCx 11/17 and pocket cultures NGTD  - afebrile, no leukocytosis   - Continue to trend fever curve & WBC  - ID okay with alternative PPM placement provided no new s/s of sepsis/ infection at placement site. Scheduled for 11/23     ===================RHEUMATOLOGIC ===================  -gout symptoms resolved  -d/c colchicine

## 2020-11-20 NOTE — CHART NOTE - NSCHARTNOTEFT_GEN_A_CORE
MAR Accept Note  Transfer to:  [facility]  Accepting Attending Physician:  Dr. Friedman  Assigned Room:  249D    Patient seen and examined.   Labs and data reviewed.   No findings precluding transfer of service.       HPI/MICU COURSE:   Please refer to MICU transfer note for full details. Briefly, this is a 74yo PMHx HTN, HLD, hypothyroid, PAF (on xarelto), SSS, s/p ILR, s/p right sided PPM with capped Biotronik RA/RV leads from 2009, new RA lead 2014, generator change (STJ) 2018 and RV lead revision 11/5/20 p/w pacemaker pocket infection. Patient underwent PPM system extraction with rapid Afib s/p DCCV in the OR on 11/17. Monitored in the CCU, on sotalol for rate controlled, currently sinus pat 40-50s. Also on Vanc by level and Cefepime for PPM pocket infection. With plan for reimplantation 11/23. Stable to transfer to floor.    FOR FOLLOW-UP:  -ID and EP consults  -PPM re-implantation 11/23  -EKG prior to 2 hours after each dose of sotalol to monitor for QTc prolongation  -Monitor kidney function given HADLEY and on abx    Rogelio Dos Santos PGY3  MAR [id number]

## 2020-11-20 NOTE — PROGRESS NOTE ADULT - PROBLEM SELECTOR PLAN 4
elevated Cr (baseline Cr ) BUN/Cr  - s/p 1L fluids in the ED  - UA grossly negative  - avoid NSAIDs, ACE/ARBs, nephrotoxic drugs, and contrast  - daily weight, strict I/O  - consider urine lytes, LR at 75cc/hr, lasix 80 IVP or x2 home dose (if CHF), NEVILLE- EF normal  - monitor Cr watery diarrhea w/ hx of clindamycin  - c.diff + GI PCR ordered 25-May-2020 11:30

## 2020-11-20 NOTE — PROGRESS NOTE ADULT - PROBLEM SELECTOR PLAN 9
DVT ppx: heparin gtt  Diet: regular  Dispo: PT consult c/w levothyroxine 50 mcg one day followed by 75 mcg the next day  - TSH WNL, continue regimen

## 2020-11-20 NOTE — PROGRESS NOTE ADULT - PROBLEM SELECTOR PLAN 2
s/p DC CV, HR to 180 and BP to 220s, s/p lopressor 5 mg IV and metoprolol tartrate 25 mg  - holding sotalol given HADLEY

## 2020-11-20 NOTE — PROGRESS NOTE ADULT - ATTENDING COMMENTS
76 yo female w/pmh HTN, HLD, hypothyroid, afib (on eliquis), SSS s/p PPM p/w pacemaker site infection. S/p gout flare. PPM has been removed, plan for re-implantation on Monday.    PPM site infection  - s/p PPM removal, re-implantation planned for Monday 11/23  - ID following  - cont cefepime, f/u cultures  - stop vanc due to HADLEY and no e/o MRSA    Atrial fibrillation  - hold sotalol due to HADLEY  - monitor on tele  - currently bradycardic 40s-50s  - f/u EP  - cont heparin drip    HADLEY  - Cr suddenly bumped to 1.86 from 0.95  - has profuse diarrhea, may be pre-renal etiology or intrinsic due to vancomycin  - encouraged drinking more water    Diarrhea - check c diff, GI pathogen panel; though may be due to antibiotics

## 2020-11-20 NOTE — PROGRESS NOTE ADULT - SUBJECTIVE AND OBJECTIVE BOX
MEDICINE ACCEPT NOTE    PROGRESS NOTE:     Patient is a 75y old  Female who presents with a chief complaint of pacemaker site infection (20 Nov 2020 08:30)    SUBJECTIVE / OVERNIGHT EVENTS: Pt seen and examined. No acute overnight events. In the morning, pt denies fever, chills, CP, SOB, abdominal pain, N/V, urinary or bowel issues.     ADDITIONAL REVIEW OF SYSTEMS: Otherwise negative    MEDICATIONS  (STANDING):  cefepime   IVPB 1000 milliGRAM(s) IV Intermittent every 12 hours  chlorhexidine 2% Cloths 1 Application(s) Topical at bedtime  heparin  Infusion. 1400 Unit(s)/Hr (14 mL/Hr) IV Continuous <Continuous>  levothyroxine 50 MICROGram(s) Oral <User Schedule>  levothyroxine 75 MICROGram(s) Oral <User Schedule>  pitavastatin 2 milliGRAM(s) Oral daily    MEDICATIONS  (PRN):  benzocaine 15 mG/menthol 3.6 mG (Sugar-Free) Lozenge 1 Lozenge Oral four times a day PRN Sore Throat  heparin   Injectable 7500 Unit(s) IV Push every 6 hours PRN For aPTT less than 40  heparin   Injectable 3500 Unit(s) IV Push every 6 hours PRN For aPTT between 40 - 57  temazepam 30 milliGRAM(s) Oral at bedtime PRN Insomnia      CAPILLARY BLOOD GLUCOSE        I&O's Summary    19 Nov 2020 07:01  -  20 Nov 2020 07:00  --------------------------------------------------------  IN: 1921 mL / OUT: 1150 mL / NET: 771 mL    20 Nov 2020 07:01  -  20 Nov 2020 12:51  --------------------------------------------------------  IN: 790 mL / OUT: 0 mL / NET: 790 mL        PHYSICAL EXAM:  Vital Signs Last 24 Hrs  T(C): 36.8 (20 Nov 2020 12:03), Max: 36.8 (20 Nov 2020 12:03)  T(F): 98.2 (20 Nov 2020 12:03), Max: 98.2 (20 Nov 2020 12:03)  HR: 52 (20 Nov 2020 12:03) (46 - 136)  BP: 123/68 (20 Nov 2020 12:03) (103/52 - 181/103)  BP(mean): 99 (20 Nov 2020 11:25) (46 - 138)  RR: 18 (20 Nov 2020 12:03) (12 - 20)  SpO2: 97% (20 Nov 2020 12:03) (89% - 98%)    CONSTITUTIONAL: NAD  RESPIRATORY: Normal respiratory effort; lungs are clear to auscultation bilaterally  CARDIOVASCULAR: Regular rate and rhythm, normal S1 and S2, no murmur/rub/gallop  ABDOMEN: Nontender to palpation, normoactive bowel sounds, no rebound/guarding; No hepatosplenomegaly  EXTREMITIES: No lower extremity edema; Peripheral pulses are 2+ bilaterally  MUSCLOSKELETAL: no clubbing or cyanosis of digits; no joint swelling or tenderness to palpation  PSYCH: A+O to person, place, and time; affect appropriate    LABS:                        13.9   8.57  )-----------( 302      ( 20 Nov 2020 06:40 )             42.6     11-20    139  |  102  |  26<H>  ----------------------------<  140<H>  3.8   |  23  |  1.86<H>    Ca    9.4      20 Nov 2020 06:39  Phos  3.9     11-20  Mg     2.2     11-20      PT/INR - ( 20 Nov 2020 06:31 )   PT: 13.5 sec;   INR: 1.13 ratio         PTT - ( 20 Nov 2020 06:31 )  PTT:118.8 sec      Culture - Surgical Swab (collected 18 Nov 2020 01:13)  Source: .Surgical Swab pacemaker podul  Preliminary Report (19 Nov 2020 07:55):    No growth    Culture - Fungal, Other (collected 18 Nov 2020 01:13)  Source: .Other Other  Preliminary Report (18 Nov 2020 15:15):    Testing in progress    Culture - Acid Fast - Tissue w/Smear (collected 18 Nov 2020 00:39)  Source: .Tissue Other    Culture - Fungal, Tissue (collected 18 Nov 2020 00:39)  Source: .Tissue pacemaker podul  Preliminary Report (18 Nov 2020 07:54):    Testing in progress    Culture - Tissue with Gram Stain (collected 18 Nov 2020 00:39)  Source: .Tissue Other  Gram Stain (18 Nov 2020 03:22):    No polymorphonuclear cells seen per low power field    No organisms seen per oil power field  Preliminary Report (18 Nov 2020 19:51):    No growth to date.        RADIOLOGY & ADDITIONAL TESTS:  Results Reviewed: Y  Imaging Personally Reviewed: Y  Electrocardiogram Personally Reviewed: Y    COORDINATION OF CARE:  Care Discussed with Consultants/Other Providers [Y/N]: Y  Prior or Outpatient Records Reviewed [Y/N]: Y

## 2020-11-20 NOTE — PROGRESS NOTE ADULT - ASSESSMENT
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 Moore for sinus bradycardia to 29, otherwise asymptomatic  She was advised to get the PPM evaluated and saw a Dr. Kent who noted one of the wires was loose  On 11/5, he elected to put in a new wire and leave the loose wire in  Patient with discharge from site and pain  CXR clear, ppm in place, reviewed personally  Given discharge from site, high concern for pocket infection--appreciate EP procedure, removal of PPM  Overall,  1) Suspected PPM infection  - Discharge from site in setting of recent device manipulation; now s/p explant PPM; BCX NGTD   - Team changed Vanco/Zosyn to Cefepime 1g q 12 (monitor CrCl)  - Check MRSA PCR nares  - Would DC Vanco consider negative PPM cultures  - F/U pending cultures/explant culture  - Wound care per EP  2) L 1st toe pain (gout?)  - Further care per primary team  3) HADLEY  - Monitor to normal, avoid Vanco for now    Team planning for further placement of PPM. Note prior PPM has been removed, no fevers, no leukocytosis, BCX NGTD, patient clinically well. Patient would still have a somewhat higher risk of infection compared to a patient who has not had a recent PPM infection, however, if more urgently indicated, it would be reasonable to place new PPM in an alternate location starting on 11/20/20, as long as no new signs of sepsis or signs of local infection at placement site.    Harvinder Gamez MD  Pager [phone]  After 5pm and on weekends call [phone]

## 2020-11-20 NOTE — CHART NOTE - NSCHARTNOTEFT_GEN_A_CORE
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 She had a R sided dual chamber PPM placed 2.5 years ago by a Dr. Moore for sinus bradycardia to 29, otherwise asymptomatic. She was without complications until 10/30 when her cardiology office [place] received a transmission from her PPM that showed "something wrong". She was advised to get the PPM evaluated and saw a Dr. Kent who noted one of the wires was loose. On11/5, he elected to put in a new wire and leave the loose wire in. However, a few days after that procedure the pt started noticing purulent discharge from the pacemaker incision site and presented to [facility]. She was prescribed a 3 day course of clindamycin but the purulent drainage did not resolved. She then called went back to [place] and has since completed 5 out of 20 day course of levofloxacin. On 11/14 she noticed acute sharp L 1st toe foot pain. denies trauma, bleeding, or discharge. Pt reports no hx of gout or pseudogout. She called Dr. Moore's office who recommended she come to [facility] for PPM removal and further evaluation. Pt still reports drainage from the ppm incision site.  Of note, pt was advised to stop taking her eliquis and diltiazem on 11/13 as she may get a procedure so she has not taken these medications since 11/13 pm.     Hospital course:   Patient was started on vancomycin and zosyn and planned for removal on 11/17. She was treated for acute gout flare in her L right great toe. The pacemaker pocket was opened, all leads freed and anchors removed. Following the procedure, developed atrial fibrillation and underwent DC CV. In the PACU, she had HR to 180 and BP to 220s. She was given lopressor 5 mg IV and metoprolol tartrate 25 mg once. She was transferred to CICU for monitoring and treatment of aflutter. Presently she complains of chest pain at the site of her pocket wound and a sore throat following extubation. Otherwise, she has no acute complaints. Her right groin is without pain. She has left toe pain. She was started on Sotalol with QTc going from 470-500.   She continued treatment with vanc and zosyn. Her rate was controlled with metoprolol and coreg. She received tylenol and oxycodone for pain.      =============================  Vital Signs Last 24 Hrs  T(C): 36.7 (20 Nov 2020 09:33), Max: 36.8 (19 Nov 2020 11:00)  T(F): 98 (20 Nov 2020 09:33), Max: 98.2 (19 Nov 2020 11:00)  HR: 50 (20 Nov 2020 10:00) (46 - 136)  BP: 119/67 (20 Nov 2020 10:00) (103/52 - 181/103)  BP(mean): 98 (20 Nov 2020 10:00) (46 - 138)  RR: 17 (20 Nov 2020 10:00) (12 - 20)  SpO2: 98% (20 Nov 2020 08:00) (89% - 98%)  ICU Vital Signs Last 24 Hrs  T(C): 36.7 (20 Nov 2020 09:33), Max: 36.8 (19 Nov 2020 11:00)  T(F): 98 (20 Nov 2020 09:33), Max: 98.2 (19 Nov 2020 11:00)  HR: 50 (20 Nov 2020 10:00) (46 - 136)  BP: 119/67 (20 Nov 2020 10:00) (103/52 - 181/103)  RR: 17 (20 Nov 2020 10:00) (12 - 20)  SpO2: 98% (20 Nov 2020 08:00) (89% - 98%)    ==============================    ==========  TELEMETRY: Second degree w bradycardia 45-75  ==========    ============================================  MEDICATIONS  (STANDING):  chlorhexidine 2% Cloths 1 Application(s) Topical at bedtime  colchicine 0.6 milliGRAM(s) Oral daily  heparin  Infusion. 1400 Unit(s)/Hr (14 mL/Hr) IV Continuous <Continuous>  levothyroxine 50 MICROGram(s) Oral <User Schedule>  levothyroxine 75 MICROGram(s) Oral <User Schedule>  pitavastatin 2 milliGRAM(s) Oral daily  sotalol 80 milliGRAM(s) Oral two times a day    MEDICATIONS  (PRN):  benzocaine 15 mG/menthol 3.6 mG (Sugar-Free) Lozenge 1 Lozenge Oral four times a day PRN Sore Throat  heparin   Injectable 7500 Unit(s) IV Push every 6 hours PRN For aPTT less than 40  heparin   Injectable 3500 Unit(s) IV Push every 6 hours PRN For aPTT between 40 - 57  temazepam 30 milliGRAM(s) Oral at bedtime PRN Insomnia    ============================================    ==========  FOLLOW UP:  ==========  - Sotalol 80 mg QD next dose 2000  - Plan for PPM implantation on 11/23  - Continue Vanco and Zosyn per ID recs  - Monitor Cr  - Transfer to Telemetry report given to Dr. Elder Calles CCU NP    #[id number] 75y F PMHx HTN, HLD, hypothyroid, afib (on eliquis), 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 She had a R sided dual chamber PPM placed 2.5 years ago by a Dr. Moore for sinus bradycardia to 29, otherwise asymptomatic. She was without complications until 10/30 when her cardiology office [place] received a transmission from her PPM that showed "something wrong". She was advised to get the PPM evaluated and saw a Dr. Kent who noted one of the wires was loose. On11/5, he elected to put in a new wire and leave the loose wire in. However, a few days after that procedure the pt started noticing purulent discharge from the pacemaker incision site and presented to [facility]. She was prescribed a 3 day course of clindamycin but the purulent drainage did not resolved. She then called went back to [place] and has since completed 5 out of 20 day course of levofloxacin. On 11/14 she noticed acute sharp L 1st toe foot pain. denies trauma, bleeding, or discharge. Pt reports no hx of gout or pseudogout. She called Dr. Moore's office who recommended she come to [facility] for PPM removal and further evaluation. Pt still reports drainage from the ppm incision site.  Of note, pt was advised to stop taking her eliquis and diltiazem on 11/13 as she may get a procedure so she has not taken these medications since 11/13 pm.     Hospital course:   Patient was started on vancomycin and zosyn and planned for removal on 11/17. She was treated for acute gout flare in her L right great toe. The pacemaker pocket was opened, all leads freed and anchors removed. Following the procedure, developed atrial fibrillation and underwent DC CV. In the PACU, she had HR to 180 and BP to 220s. She was given lopressor 5 mg IV and metoprolol tartrate 25 mg once. She was transferred to CICU for monitoring and treatment of aflutter. Presently she complains of chest pain at the site of her pocket wound and a sore throat following extubation. Otherwise, she has no acute complaints. Her right groin is without pain. She has left toe pain. She was started on Sotalol with QTc going from 470-500.   She continued treatment with vanc and zosyn. Her rate was controlled with metoprolol and coreg. She received tylenol and oxycodone for pain.      =============================  Vital Signs Last 24 Hrs  T(C): 36.7 (20 Nov 2020 09:33), Max: 36.8 (19 Nov 2020 11:00)  T(F): 98 (20 Nov 2020 09:33), Max: 98.2 (19 Nov 2020 11:00)  HR: 50 (20 Nov 2020 10:00) (46 - 136)  BP: 119/67 (20 Nov 2020 10:00) (103/52 - 181/103)  BP(mean): 98 (20 Nov 2020 10:00) (46 - 138)  RR: 17 (20 Nov 2020 10:00) (12 - 20)  SpO2: 98% (20 Nov 2020 08:00) (89% - 98%)  ICU Vital Signs Last 24 Hrs  T(C): 36.7 (20 Nov 2020 09:33), Max: 36.8 (19 Nov 2020 11:00)  T(F): 98 (20 Nov 2020 09:33), Max: 98.2 (19 Nov 2020 11:00)  HR: 50 (20 Nov 2020 10:00) (46 - 136)  BP: 119/67 (20 Nov 2020 10:00) (103/52 - 181/103)  RR: 17 (20 Nov 2020 10:00) (12 - 20)  SpO2: 98% (20 Nov 2020 08:00) (89% - 98%)    ==============================    ==========  TELEMETRY: Second degree w bradycardia 45-75  ==========    ============================================  MEDICATIONS  (STANDING):  chlorhexidine 2% Cloths 1 Application(s) Topical at bedtime  colchicine 0.6 milliGRAM(s) Oral daily  heparin  Infusion. 1400 Unit(s)/Hr (14 mL/Hr) IV Continuous <Continuous>  levothyroxine 50 MICROGram(s) Oral <User Schedule>  levothyroxine 75 MICROGram(s) Oral <User Schedule>  pitavastatin 2 milliGRAM(s) Oral daily  sotalol 80 milliGRAM(s) Oral two times a day    MEDICATIONS  (PRN):  benzocaine 15 mG/menthol 3.6 mG (Sugar-Free) Lozenge 1 Lozenge Oral four times a day PRN Sore Throat  heparin   Injectable 7500 Unit(s) IV Push every 6 hours PRN For aPTT less than 40  heparin   Injectable 3500 Unit(s) IV Push every 6 hours PRN For aPTT between 40 - 57  temazepam 30 milliGRAM(s) Oral at bedtime PRN Insomnia    ============================================    ==========  FOLLOW UP:  ==========  - f/u EP recs for antiarrythmics   - Plan for PPM implantation on 11/23  - vanc level at 4PM 11/20, dose vanc accordingly. f/u ID recs  - Monitor Cr  - Transfer to Telemetry report given to Dr. Elder Calles CCU NP    #[id number] 75y F PMHx HTN, HLD, hypothyroid, afib (on eliquis), 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 She had a R sided dual chamber PPM placed 2.5 years ago by a Dr. Moore for sinus bradycardia to 29, otherwise asymptomatic. She was without complications until 10/30 when her cardiology office [place] received a transmission from her PPM that showed "something wrong". She was advised to get the PPM evaluated and saw a Dr. Kent who noted one of the wires was loose. On11/5, he elected to put in a new wire and leave the loose wire in. However, a few days after that procedure the pt started noticing purulent discharge from the pacemaker incision site and presented to [facility]. She was prescribed a 3 day course of clindamycin but the purulent drainage did not resolved. She then called went back to [place] and has since completed 5 out of 20 day course of levofloxacin. On 11/14 she noticed acute sharp L 1st toe foot pain. denies trauma, bleeding, or discharge. Pt reports no hx of gout or pseudogout. She called Dr. Moore's office who recommended she come to [facility] for PPM removal and further evaluation. Pt still reports drainage from the ppm incision site.  Of note, pt was advised to stop taking her eliquis and diltiazem on 11/13 as she may get a procedure so she has not taken these medications since 11/13 pm.     Hospital course:   Patient was started on vancomycin and zosyn and planned for removal on 11/17. She was treated for acute gout flare in her L right great toe. The pacemaker pocket was opened, all leads freed and anchors removed. Following the procedure, developed atrial fibrillation and underwent DC CV. In the PACU, she had HR to 180 and BP to 220s. She was given lopressor 5 mg IV and metoprolol tartrate 25 mg once. She was transferred to CICU for monitoring and treatment of aflutter. Presently she complains of chest pain at the site of her pocket wound and a sore throat following extubation. Otherwise, she has no acute complaints. Her right groin is without pain. She has left toe pain. She was started on Sotalol with QTc going from 470-500.   She continued treatment with vanc and zosyn. Her rate was controlled with metoprolol and coreg. She received tylenol and oxycodone for pain.      =============================  Vital Signs Last 24 Hrs  T(C): 36.7 (20 Nov 2020 09:33), Max: 36.8 (19 Nov 2020 11:00)  T(F): 98 (20 Nov 2020 09:33), Max: 98.2 (19 Nov 2020 11:00)  HR: 50 (20 Nov 2020 10:00) (46 - 136)  BP: 119/67 (20 Nov 2020 10:00) (103/52 - 181/103)  BP(mean): 98 (20 Nov 2020 10:00) (46 - 138)  RR: 17 (20 Nov 2020 10:00) (12 - 20)  SpO2: 98% (20 Nov 2020 08:00) (89% - 98%)  ICU Vital Signs Last 24 Hrs  T(C): 36.7 (20 Nov 2020 09:33), Max: 36.8 (19 Nov 2020 11:00)  T(F): 98 (20 Nov 2020 09:33), Max: 98.2 (19 Nov 2020 11:00)  HR: 50 (20 Nov 2020 10:00) (46 - 136)  BP: 119/67 (20 Nov 2020 10:00) (103/52 - 181/103)  RR: 17 (20 Nov 2020 10:00) (12 - 20)  SpO2: 98% (20 Nov 2020 08:00) (89% - 98%)    ==============================    ==========  TELEMETRY: Second degree w bradycardia 45-75  ==========    ============================================  MEDICATIONS  (STANDING):  chlorhexidine 2% Cloths 1 Application(s) Topical at bedtime  colchicine 0.6 milliGRAM(s) Oral daily  heparin  Infusion. 1400 Unit(s)/Hr (14 mL/Hr) IV Continuous <Continuous>  levothyroxine 50 MICROGram(s) Oral <User Schedule>  levothyroxine 75 MICROGram(s) Oral <User Schedule>  pitavastatin 2 milliGRAM(s) Oral daily  sotalol 80 milliGRAM(s) Oral two times a day    MEDICATIONS  (PRN):  benzocaine 15 mG/menthol 3.6 mG (Sugar-Free) Lozenge 1 Lozenge Oral four times a day PRN Sore Throat  heparin   Injectable 7500 Unit(s) IV Push every 6 hours PRN For aPTT less than 40  heparin   Injectable 3500 Unit(s) IV Push every 6 hours PRN For aPTT between 40 - 57  temazepam 30 milliGRAM(s) Oral at bedtime PRN Insomnia    ============================================    ==========  FOLLOW UP:  ==========  - f/u EP recs for antiarrythmics. sotalol stopped 11/20 for jeanie  - Plan for PPM implantation on 11/23  - vanc level at 4PM 11/20, dose vanc accordingly. f/u ID recs  - Monitor Cr (4pm BMP 11/20)  - Transfer to Telemetry report given to Dr. Elder Calles CCU NP    #[id number]

## 2020-11-20 NOTE — PROGRESS NOTE ADULT - SUBJECTIVE AND OBJECTIVE BOX
24H hour events: Patient c/o diarrhea--reports has been having since Abx initiation. Tele overnight showed episode of AF/AFL w/ RVR    MEDICATIONS:  heparin   Injectable 7500 Unit(s) IV Push every 6 hours PRN  heparin   Injectable 3500 Unit(s) IV Push every 6 hours PRN  heparin  Infusion. 1400 Unit(s)/Hr IV Continuous <Continuous>  cefepime   IVPB 1000 milliGRAM(s) IV Intermittent every 12 hours  temazepam 30 milliGRAM(s) Oral at bedtime PRN  levothyroxine 50 MICROGram(s) Oral <User Schedule>  levothyroxine 75 MICROGram(s) Oral <User Schedule>  pitavastatin 2 milliGRAM(s) Oral daily  benzocaine 15 mG/menthol 3.6 mG (Sugar-Free) Lozenge 1 Lozenge Oral four times a day PRN  chlorhexidine 2% Cloths 1 Application(s) Topical at bedtime      REVIEW OF SYSTEMS:  See HPI, otherwise ROS negative.    PHYSICAL EXAM:  T(C): 36.3 (11-20-20 @ 13:45), Max: 36.8 (11-20-20 @ 12:03)  HR: 50 (11-20-20 @ 13:45) (46 - 136)  BP: 152/75 (11-20-20 @ 13:45) (103/52 - 181/103)  RR: 17 (11-20-20 @ 13:45) (12 - 20)  SpO2: 94% (11-20-20 @ 13:45) (89% - 98%)  Wt(kg): --  I&O's Summary    19 Nov 2020 07:01  -  20 Nov 2020 07:00  --------------------------------------------------------  IN: 1921 mL / OUT: 1150 mL / NET: 771 mL    20 Nov 2020 07:01  -  20 Nov 2020 14:46  --------------------------------------------------------  IN: 1030 mL / OUT: 0 mL / NET: 1030 mL        Appearance: Alert. NAD	  Cardiovascular: +S1S2 RRR no m/g/r  Pacer extraction site (left pectoral): +wound vac in place  Respiratory: CTA B/L	  Psychiatry: A & O x 3, Mood & affect appropriate  Neurologic: Non-focal  Extremities: No edema BLE  Vascular: Peripheral pulses palpable 2+ bilaterally      LABS:	 	    CBC Full  -  ( 20 Nov 2020 06:40 )  WBC Count : 8.57 K/uL  Hemoglobin : 13.9 g/dL  Hematocrit : 42.6 %  Platelet Count - Automated : 302 K/uL  Mean Cell Volume : 92.2 fl  Mean Cell Hemoglobin : 30.1 pg  Mean Cell Hemoglobin Concentration : 32.6 gm/dL    11-20    139  |  102  |  26<H>  ----------------------------<  140<H>  3.8   |  23  |  1.86<H>  11-19    139  |  100  |  16  ----------------------------<  127<H>  3.9   |  25  |  0.94    Ca    9.4      20 Nov 2020 06:39  Ca    9.1      19 Nov 2020 04:58  Phos  3.9     11-20  Phos  3.0     11-19  Mg     2.2     11-20  Mg     2.1     11-19      TELEMETRY: SR ~50bpm; episode of AF/AFL overnight    	  ASSESSMENT/PLAN: 	  74y/o female h/o HTN, HLD, hypothyroid, PAF on xarelto, SSS, s/p ILR, s/p right sided PPM with capped Biotronik RA/RV leads from 2009, new RA lead 2014, generator change (STJ) 2018 and RV lead revision 11/5/20 p/w pacemaker pocket infection. Patient underwent PPM system extraction with rapid Afib s/p DCCV in the OR on 11/17. Now with increased burden of rapid PAF/PAFL.     1. PPM pocket infection s/p extraction   2. SSS s/p PPM  3. pAfib/pAFL with RVR  4. Left foot pain (?gout), started on colchicine    24H hour events: Patient c/o diarrhea--reports has been having since Abx initiation. Tele overnight showed episode of AF/AFL w/ RVR    MEDICATIONS:  heparin   Injectable 7500 Unit(s) IV Push every 6 hours PRN  heparin   Injectable 3500 Unit(s) IV Push every 6 hours PRN  heparin  Infusion. 1400 Unit(s)/Hr IV Continuous <Continuous>  cefepime   IVPB 1000 milliGRAM(s) IV Intermittent every 12 hours  temazepam 30 milliGRAM(s) Oral at bedtime PRN  levothyroxine 50 MICROGram(s) Oral <User Schedule>  levothyroxine 75 MICROGram(s) Oral <User Schedule>  pitavastatin 2 milliGRAM(s) Oral daily  benzocaine 15 mG/menthol 3.6 mG (Sugar-Free) Lozenge 1 Lozenge Oral four times a day PRN  chlorhexidine 2% Cloths 1 Application(s) Topical at bedtime      REVIEW OF SYSTEMS:  See HPI, otherwise ROS negative.    PHYSICAL EXAM:  T(C): 36.3 (11-20-20 @ 13:45), Max: 36.8 (11-20-20 @ 12:03)  HR: 50 (11-20-20 @ 13:45) (46 - 136)  BP: 152/75 (11-20-20 @ 13:45) (103/52 - 181/103)  RR: 17 (11-20-20 @ 13:45) (12 - 20)  SpO2: 94% (11-20-20 @ 13:45) (89% - 98%)  Wt(kg): --  I&O's Summary    19 Nov 2020 07:01  -  20 Nov 2020 07:00  --------------------------------------------------------  IN: 1921 mL / OUT: 1150 mL / NET: 771 mL    20 Nov 2020 07:01  -  20 Nov 2020 14:46  --------------------------------------------------------  IN: 1030 mL / OUT: 0 mL / NET: 1030 mL        Appearance: Alert. NAD	  Cardiovascular: +S1S2 RRR no m/g/r  Pacer extraction site (left pectoral): +wound vac in place  Respiratory: CTA B/L	  Psychiatry: A & O x 3, Mood & affect appropriate  Neurologic: Non-focal  Extremities: No edema BLE  Vascular: Peripheral pulses palpable 2+ bilaterally      LABS:	 	    CBC Full  -  ( 20 Nov 2020 06:40 )  WBC Count : 8.57 K/uL  Hemoglobin : 13.9 g/dL  Hematocrit : 42.6 %  Platelet Count - Automated : 302 K/uL  Mean Cell Volume : 92.2 fl  Mean Cell Hemoglobin : 30.1 pg  Mean Cell Hemoglobin Concentration : 32.6 gm/dL    11-20    139  |  102  |  26<H>  ----------------------------<  140<H>  3.8   |  23  |  1.86<H>  11-19    139  |  100  |  16  ----------------------------<  127<H>  3.9   |  25  |  0.94    Ca    9.4      20 Nov 2020 06:39  Ca    9.1      19 Nov 2020 04:58  Phos  3.9     11-20  Phos  3.0     11-19  Mg     2.2     11-20  Mg     2.1     11-19      TELEMETRY: SR ~50bpm; episode of AF/AFL overnight    	  ASSESSMENT/PLAN: 	  76y/o female h/o HTN, HLD, hypothyroid, PAF on xarelto, SSS, s/p ILR, s/p right sided PPM with capped Biotronik RA/RV leads from 2009, new RA lead 2014, generator change (STJ) 2018 and RV lead revision 11/5/20 p/w pacemaker pocket infection. Patient underwent PPM system extraction with rapid Afib s/p DCCV in the OR on 11/17. Now with increased burden of rapid PAF/PAFL.     1. PPM pocket infection s/p extraction   2. SSS s/p PPM  3. pAfib/pAFL with RVR  4. HADLEY in setting of diarrhea & Vanco    --Would give IV fluids to hydrate patient  --Vanco d/c'ed and now on Cefepime. Appreciate ID input  --Repeat Cr today. If renal function remains same or improving, would continue Sotalol 80mg BID with EKG to be checked 2hrs after each dose  --Plans for PPM reimplant Monday if remains stable over weekend    Malia Anderson PA-C  [id number]   24H hour events: Patient c/o diarrhea--reports has been having since Abx initiation. Tele overnight showed episode of AF/AFL w/ RVR    MEDICATIONS:  heparin   Injectable 7500 Unit(s) IV Push every 6 hours PRN  heparin   Injectable 3500 Unit(s) IV Push every 6 hours PRN  heparin  Infusion. 1400 Unit(s)/Hr IV Continuous <Continuous>  cefepime   IVPB 1000 milliGRAM(s) IV Intermittent every 12 hours  temazepam 30 milliGRAM(s) Oral at bedtime PRN  levothyroxine 50 MICROGram(s) Oral <User Schedule>  levothyroxine 75 MICROGram(s) Oral <User Schedule>  pitavastatin 2 milliGRAM(s) Oral daily  benzocaine 15 mG/menthol 3.6 mG (Sugar-Free) Lozenge 1 Lozenge Oral four times a day PRN  chlorhexidine 2% Cloths 1 Application(s) Topical at bedtime      REVIEW OF SYSTEMS:  See HPI, otherwise ROS negative.    PHYSICAL EXAM:  T(C): 36.3 (11-20-20 @ 13:45), Max: 36.8 (11-20-20 @ 12:03)  HR: 50 (11-20-20 @ 13:45) (46 - 136)  BP: 152/75 (11-20-20 @ 13:45) (103/52 - 181/103)  RR: 17 (11-20-20 @ 13:45) (12 - 20)  SpO2: 94% (11-20-20 @ 13:45) (89% - 98%)  Wt(kg): --  I&O's Summary    19 Nov 2020 07:01  -  20 Nov 2020 07:00  --------------------------------------------------------  IN: 1921 mL / OUT: 1150 mL / NET: 771 mL    20 Nov 2020 07:01  -  20 Nov 2020 14:46  --------------------------------------------------------  IN: 1030 mL / OUT: 0 mL / NET: 1030 mL        Appearance: Alert. NAD	  Cardiovascular: +S1S2 RRR no m/g/r  Pacer extraction site (left pectoral): +wound vac in place  Respiratory: CTA B/L	  Psychiatry: A & O x 3, Mood & affect appropriate  Neurologic: Non-focal  Extremities: No edema BLE  Vascular: Peripheral pulses palpable 2+ bilaterally      LABS:	 	    CBC Full  -  ( 20 Nov 2020 06:40 )  WBC Count : 8.57 K/uL  Hemoglobin : 13.9 g/dL  Hematocrit : 42.6 %  Platelet Count - Automated : 302 K/uL  Mean Cell Volume : 92.2 fl  Mean Cell Hemoglobin : 30.1 pg  Mean Cell Hemoglobin Concentration : 32.6 gm/dL    11-20    139  |  102  |  26<H>  ----------------------------<  140<H>  3.8   |  23  |  1.86<H>  11-19    139  |  100  |  16  ----------------------------<  127<H>  3.9   |  25  |  0.94    Ca    9.4      20 Nov 2020 06:39  Ca    9.1      19 Nov 2020 04:58  Phos  3.9     11-20  Phos  3.0     11-19  Mg     2.2     11-20  Mg     2.1     11-19      TELEMETRY: SR ~50bpm; episode of AF/AFL overnight    	  ASSESSMENT/PLAN: 	  76y/o female h/o HTN, HLD, hypothyroid, PAF on xarelto, SSS, s/p ILR, s/p right sided PPM with capped Biotronik RA/RV leads from 2009, new RA lead 2014, generator change (STJ) 2018 and RV lead revision 11/5/20 p/w pacemaker pocket infection. Patient underwent PPM system extraction with rapid Afib s/p DCCV in the OR on 11/17. Now with increased burden of rapid PAF/PAFL.     1. PPM pocket infection s/p extraction   2. SSS s/p PPM  3. pAfib/pAFL with RVR  4. HADLEY in setting of diarrhea & Vanco    --Would give IV fluids to hydrate patient  --Vanco d/c'ed and now on Cefepime. Appreciate ID input  --Repeat Cr today. If renal function remains same or improving, would continue Sotalol 80mg BID with EKG to be checked 2hrs after each dose  --Plans for PPM reimplant Monday if remains stable over weekend    Malia Anderson PA-C  [id number]      Addendum:    Repeat Cr 1.95, Restart Sotalol at 40mg BID. Check EKG 2hrs after each dose. Avoid AV ana maria blocking agents if patient goes into AF/AFL w/ RVR as patient hemodynamically stable when in AF and more concern of significant pat/conversion pauses in that setting.

## 2020-11-20 NOTE — PROGRESS NOTE ADULT - PROBLEM SELECTOR PLAN 8
c/w levothyroxine 50 mcg one day followed by 75 mcg the next day  - TSH WNL, continue regimen pt takes livalo (pitavastatin) as she is intolerant to other statins  - c/w pitavastatin

## 2020-11-20 NOTE — PROGRESS NOTE ADULT - PROBLEM SELECTOR PLAN 5
Left 1st MTP arthralgia. Clinically appears to be gout vs less likely pseudogout given location. Doubt septic arthritis. Doubt IE emboli.   - Colchicine 1.2 mg po x1 and 0.6 mg po daily. elevated Cr (baseline Cr 1) BUN/Cr 15, possible pre-renal vs vanc, zosyn   - UA and urine lytes ordered  - avoid NSAIDs, ACE/ARBs, nephrotoxic drugs, and contrast  - strict I/O  - monitor Cr

## 2020-11-20 NOTE — PROGRESS NOTE ADULT - PROBLEM SELECTOR PLAN 1
- PPM site infection, PPM removal/exchange 11/17, PPM reimplantation Monday (11/23/20)  - cultures NGTD  - continue vanc/zosyn (11/16- )  - EP and ID following - PPM site infection, PPM removal/exchange 11/17, PPM reimplantation Monday (11/23/20)  - cultures NGTD  - continue vanc by level and cefepime (11/16- )  - EP and ID following

## 2020-11-20 NOTE — PROGRESS NOTE ADULT - PROBLEM SELECTOR PLAN 6
- c/w coreg 3.125 mg BID  - c/w losartan 100 mg qd  - c/w lasix 30 mg qd Left 1st MTP arthralgia. Clinically appears to be gout vs less likely pseudogout given location. Doubt septic arthritis. Doubt IE emboli.   - Colchicine 1.2 mg po x1 and 0.6 mg po daily. Left 1st MTP arthralgia. Clinically appears to be gout vs less likely pseudogout given location. Doubt septic arthritis. Doubt IE emboli.   - Received Colchicine 1.2 mg po x1 and 0.6 mg po x2, will hold off on further prophylaxis as it was her first flare

## 2020-11-20 NOTE — PROGRESS NOTE ADULT - PROBLEM SELECTOR PLAN 7
pt takes livalo (pitavastatin) as she is intolerant to other statins  - c/w pitavastatin - home BP meds coreg 3.125 mg BID, losartan 100 mg qd, lasix 30 mg qd  - holding 2/2 to infxn

## 2020-11-20 NOTE — PROGRESS NOTE ADULT - ATTENDING COMMENTS
I have personally seen, examined and participated in the care of this patient. I have reviewed all pertinent clinical information, including history, physical exam, plan and the resident's note. I agree with the resident's note with the following additions:    Infected pacemaker pocket s/p system explant complicated by fib/flutter with RVR and hypertension  Sick sinus syndrome, alternating between fib/flutter with RVR and sinus rhythm 50s-60s, acceptable BPs   On Sotalol per EP although may need to hold given acute GFR decrease  Pacemaker re-implant 11/23 - cleared by ID  O2 sats mid to high 90s on room air  Regular diet  Non-oliguric HADLEY  H/H normal on Heparin drip for fib/flutter   Afebrile, Clindamycin/Levofloxacin as outpatient, now on Vancomycin and Zosyn (11/17- ) but will transition Zosyn to Cefepime and dose Vanc by level  Cultures negative, wound vac in place  Sugars controlled  No central access I have personally seen, examined and participated in the care of this patient. I have reviewed all pertinent clinical information, including history, physical exam, plan and the resident's note. I agree with the resident's note with the following additions:    Infected pacemaker pocket s/p system explant complicated by fib/flutter with RVR and hypertension  Sick sinus syndrome, alternating between fib/flutter with RVR and sinus rhythm 50s-60s, acceptable BPs   On Sotalol per EP although may need to hold given acute GFR decrease  Pacemaker re-implant 11/23 - cleared by ID  O2 sats mid to high 90s on room air  Regular diet; +diarrhea post Miralax - will hold  New-onset, non-oliguric HADLEY - likely multifactorial from dehydration in setting of diarrhea and ? Zosyn and Vancomycin - fluid challenge  H/H normal on Heparin drip for fib/flutter   Afebrile, Clindamycin/Levofloxacin as outpatient, now on Vancomycin and Zosyn (11/17- )  Will transition Zosyn to Cefepime and dose Vanc by level  Cultures negative, wound vac in place  Sugars controlled  No central access

## 2020-11-20 NOTE — PROGRESS NOTE ADULT - SUBJECTIVE AND OBJECTIVE BOX
CC: F/U for PPM infection    Saw/spoke to patient. No fevers, no chills. No new complaints.    Allergies  latex (Rash)  penicillin (Rash (Mild))    ANTIMICROBIALS:  cefepime   IVPB 1000 every 12 hours    PE:    Vital Signs Last 24 Hrs  T(C): 36.3 (20 Nov 2020 13:45), Max: 36.8 (20 Nov 2020 12:03)  T(F): 97.4 (20 Nov 2020 13:45), Max: 98.2 (20 Nov 2020 12:03)  HR: 50 (20 Nov 2020 13:45) (46 - 136)  BP: 152/75 (20 Nov 2020 13:45) (103/52 - 181/103)  BP(mean): 99 (20 Nov 2020 11:25) (46 - 138)  RR: 17 (20 Nov 2020 13:45) (12 - 20)  SpO2: 94% (20 Nov 2020 13:45) (89% - 98%)    Gen: AOx3, NAD, non-toxic  CV: S1+S2 normal, nontachycardic  Resp: Clear bilat, no resp distress, no crackles/wheezes  Abd: Soft, nontender, +BS  Ext: No LE edema, no wounds    LABS:                        13.9   8.57  )-----------( 302      ( 20 Nov 2020 06:40 )             42.6     11-20    139  |  102  |  26<H>  ----------------------------<  140<H>  3.8   |  23  |  1.86<H>    Ca    9.4      20 Nov 2020 06:39  Phos  3.9     11-20  Mg     2.2     11-20    MICROBIOLOGY:    .Other Other  11-18-20   Testing in progress     .Tissue Other  11-18-20   No growth to date.  --    No polymorphonuclear cells seen per low power field  No organisms seen per oil power field    .Blood Blood-Peripheral  11-17-20   No growth to date.    (otherwise reviewed)    RADIOLOGY:    11/18 XR:    FINDINGS:    Lines/Tubes: In expected locations    Heart and mediastinum:  Unchanged in appearance.    Lungs, pleura, and airways: Clear lungs    Bones and soft tissues: The bones and soft tissues are unchanged.    Impression:    Clear lungs    History states "line placement "however no new catheter or line is identified.

## 2020-11-20 NOTE — PROGRESS NOTE ADULT - SUBJECTIVE AND OBJECTIVE BOX
CIRILO ROLDAN  MRN-[id number]  Patient is a 75y old  Female who presents with a chief complaint of pacemaker site infection (19 Nov 2020 23:47)    HPI:  75y F PMHx HTN, HLD, hypothyroid, afib (on eliquis), 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 She had a R sided dual chamber PPM placed 2.5 years ago by a Dr. Moore for sinus bradycardia to 29, otherwise asymptomatic. She was without complications until 10/30 when her cardiology office [place] received a transmission from her PPM that showed "something wrong". She was advised to get the PPM evaluated and saw a Dr. Kent who noted one of the wires was loose. On11/5, he elected to put in a new wire and leave the loose wire in. However, a few days after that procedure the pt started noticing purulent discharge from the pacemaker incision site and presented to [facility]. She was prescribed a 3 day course of clindamycin but the purulent drainage did not resolved. She then called went back to [place] and has since completed 5 out of 20 day course of levofloxacin. On 11/14 she noticed acute sharp L 1st toe foot pain. denies trauma, bleeding, or discharge. Pt reports no hx of gout or pseudogout. She called Dr. Moore's office who recommended she come to [facility] for PPM removal and further evaluation. Pt still reports drainage from the ppm incision site.    Of note, pt was advised to stop taking her eliquis and diltiazem on 11/13 as she may get a procedure so she has not taken these medications since 11/13 pm.     ED course:   afebrile, HR 68, /102, RR 18, 97% on RA  Received CXR (16 Nov 2020 23:02)      24 HOUR EVENTS:    REVIEW OF SYSTEMS:    CONSTITUTIONAL: No weakness, fevers or chills  EYES/ENT: No visual changes;  No vertigo or throat pain   NECK: No pain or stiffness  RESPIRATORY: No cough, wheezing, hemoptysis; No shortness of breath  CARDIOVASCULAR: No chest pain or palpitations  GASTROINTESTINAL: No abdominal or epigastric pain. No nausea, vomiting, or hematemesis; No diarrhea or constipation. No melena or hematochezia.  GENITOURINARY: No dysuria, frequency or hematuria  NEUROLOGICAL: No numbness or weakness  SKIN: No itching, rashes      ICU Vital Signs Last 24 Hrs  T(C): 36.5 (20 Nov 2020 06:00), Max: 36.8 (19 Nov 2020 11:00)  T(F): 97.7 (20 Nov 2020 06:00), Max: 98.2 (19 Nov 2020 11:00)  HR: 50 (20 Nov 2020 08:00) (46 - 136)  BP: 139/65 (20 Nov 2020 08:00) (103/52 - 181/103)  BP(mean): 93 (20 Nov 2020 08:00) (46 - 138)  ABP: --  ABP(mean): --  RR: 20 (20 Nov 2020 08:00) (12 - 20)  SpO2: 98% (20 Nov 2020 08:00) (89% - 98%)      CVP(mm Hg): --  CO: --  CI: --  PA: --  PA(mean): --  PA(direct): --  PCWP: --  LA: --  RA: --  SVR: --  SVRI: --  PVR: --  PVRI: --  I&O's Summary    19 Nov 2020 07:01  -  20 Nov 2020 07:00  --------------------------------------------------------  IN: 1921 mL / OUT: 1150 mL / NET: 771 mL    20 Nov 2020 07:01  -  20 Nov 2020 08:31  --------------------------------------------------------  IN: 26 mL / OUT: 0 mL / NET: 26 mL        CAPILLARY BLOOD GLUCOSE    CAPILLARY BLOOD GLUCOSE          PHYSICAL EXAM:  GENERAL: No acute distress, well-developed  HEAD:  Atraumatic, Normocephalic  EYES: EOMI, PERRLA, conjunctiva and sclera clear  NECK: Supple, no lymphadenopathy, no JVD  CHEST/LUNG: CTAB; No wheezes, rales, or rhonchi  HEART: Regular rate and rhythm. Normal S1/S2. No murmurs, rubs, or gallops  ABDOMEN: Soft, non-tender, non-distended; normal bowel sounds, no organomegaly  EXTREMITIES:  2+ peripheral pulses b/l, No clubbing, cyanosis, or edema  NEUROLOGY: A&O x 3, no focal deficits  SKIN: No rashes or lesions    ============================I/O===========================   I&O's Detail    19 Nov 2020 07:01  -  20 Nov 2020 07:00  --------------------------------------------------------  IN:    Heparin Infusion: 84 mL    Heparin Infusion: 272 mL    IV PiggyBack: 250 mL    IV PiggyBack: 475 mL    Oral Fluid: 840 mL  Total IN: 1921 mL    OUT:    VAC (Vacuum Assisted Closure) System (mL): 0 mL    Voided (mL): 1150 mL  Total OUT: 1150 mL    Total NET: 771 mL      20 Nov 2020 07:01  -  20 Nov 2020 08:31  --------------------------------------------------------  IN:    Heparin Infusion: 26 mL  Total IN: 26 mL    OUT:  Total OUT: 0 mL    Total NET: 26 mL        ============================ LABS =========================                        13.9   8.57  )-----------( 302      ( 20 Nov 2020 06:40 )             42.6     11-20    139  |  102  |  26<H>  ----------------------------<  140<H>  3.8   |  23  |  1.86<H>    Ca    9.4      20 Nov 2020 06:39  Phos  3.9     11-20  Mg     2.2     11-20                    PT/INR - ( 20 Nov 2020 06:31 )   PT: 13.5 sec;   INR: 1.13 ratio         PTT - ( 20 Nov 2020 06:31 )  PTT:118.8 sec        ======================Micro/Rad/Cardio=================  Telemtry: Reviewed   EKG: Reviewed  CXR: Reviewed  Culture: Reviewed     ======================================================  PAST MEDICAL & SURGICAL HISTORY:  Hypothyroid    Afib    HLD (hyperlipidemia)    HTN (hypertension)    History of appendectomy    H/O cardiac pacemaker       CIRILO ROLDAN  MRN-[id number]  Patient is a 75y old  Female who presents with a chief complaint of pacemaker site infection (19 Nov 2020 23:47)    HPI:  75y F PMHx HTN, HLD, hypothyroid, afib (on eliquis), 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 She had a R sided dual chamber PPM placed 2.5 years ago by a Dr. Moore for sinus bradycardia to 29, otherwise asymptomatic. She was without complications until 10/30 when her cardiology office [facility] received a transmission from her PPM that showed "something wrong". She was advised to get the PPM evaluated and saw a Dr. Kent who noted one of the wires was loose. On11/5, he elected to put in a new wire and leave the loose wire in. However, a few days after that procedure the pt started noticing purulent discharge from the pacemaker incision site and presented to [facility]. She was prescribed a 3 day course of clindamycin but the purulent drainage did not resolved. She then called went back to [place] and has since completed 5 out of 20 day course of levofloxacin. On 11/14 she noticed acute sharp L 1st toe foot pain. denies trauma, bleeding, or discharge. Pt reports no hx of gout or pseudogout. She called Dr. Moore's office who recommended she come to [facility] for PPM removal and further evaluation. Pt still reports drainage from the ppm incision site.    Of note, pt was advised to stop taking her eliquis and diltiazem on 11/13 as she may get a procedure so she has not taken these medications since 11/13 pm.     ED course:   afebrile, HR 68, /102, RR 18, 97% on RA  Received CXR (16 Nov 2020 23:02)      24 HOUR EVENTS:  -diarrhea overnight   -episodes of Afib/flutter, as well as bradycardia. BP remained stable     REVIEW OF SYSTEMS:    CONSTITUTIONAL: No weakness, fevers or chills  EYES/ENT: No visual changes;  No vertigo or throat pain   NECK: No pain or stiffness  RESPIRATORY: No cough, wheezing, hemoptysis; No shortness of breath  CARDIOVASCULAR: No chest pain or palpitations  GASTROINTESTINAL: Abdominal pain, diarrhea   GENITOURINARY: No dysuria, frequency or hematuria  NEUROLOGICAL: No numbness or weakness  SKIN: No itching, rashes      ICU Vital Signs Last 24 Hrs  T(C): 36.5 (20 Nov 2020 06:00), Max: 36.8 (19 Nov 2020 11:00)  T(F): 97.7 (20 Nov 2020 06:00), Max: 98.2 (19 Nov 2020 11:00)  HR: 50 (20 Nov 2020 08:00) (46 - 136)  BP: 139/65 (20 Nov 2020 08:00) (103/52 - 181/103)  BP(mean): 93 (20 Nov 2020 08:00) (46 - 138)  ABP: --  ABP(mean): --  RR: 20 (20 Nov 2020 08:00) (12 - 20)  SpO2: 98% (20 Nov 2020 08:00) (89% - 98%)      19 Nov 2020 07:01  -  20 Nov 2020 07:00  --------------------------------------------------------  IN: 1921 mL / OUT: 1150 mL / NET: 771 mL    20 Nov 2020 07:01  -  20 Nov 2020 08:31  --------------------------------------------------------  IN: 26 mL / OUT: 0 mL / NET: 26 mL      PHYSICAL EXAM:  GENERAL: No acute distress, well-developed  HEAD:  Atraumatic, Normocephalic  EYES: EOMI, PERRLA, conjunctiva and sclera clear  NECK: Supple, no lymphadenopathy, no JVD  CHEST/LUNG: CTAB; No wheezes, rales, or rhonchi  HEART: Regular rate and rhythm. Normal S1/S2. No murmurs, rubs, or gallops  ABDOMEN: Soft, non-tender, non-distended; normal bowel sounds, no organomegaly  EXTREMITIES:  2+ peripheral pulses b/l, No clubbing, cyanosis, or edema  NEUROLOGY: A&O x 3, no focal deficits  SKIN: No rashes or lesions    ============================I/O===========================   I&O's Detail    19 Nov 2020 07:01  -  20 Nov 2020 07:00  --------------------------------------------------------  IN:    Heparin Infusion: 84 mL    Heparin Infusion: 272 mL    IV PiggyBack: 250 mL    IV PiggyBack: 475 mL    Oral Fluid: 840 mL  Total IN: 1921 mL    OUT:    VAC (Vacuum Assisted Closure) System (mL): 0 mL    Voided (mL): 1150 mL  Total OUT: 1150 mL    Total NET: 771 mL      20 Nov 2020 07:01  -  20 Nov 2020 08:31  --------------------------------------------------------  IN:    Heparin Infusion: 26 mL  Total IN: 26 mL    OUT:  Total OUT: 0 mL    Total NET: 26 mL        ============================ LABS =========================                        13.9   8.57  )-----------( 302      ( 20 Nov 2020 06:40 )             42.6     11-20    139  |  102  |  26<H>  ----------------------------<  140<H>  3.8   |  23  |  1.86<H>    Ca    9.4      20 Nov 2020 06:39  Phos  3.9     11-20  Mg     2.2     11-20      PT/INR - ( 20 Nov 2020 06:31 )   PT: 13.5 sec;   INR: 1.13 ratio         PTT - ( 20 Nov 2020 06:31 )  PTT:118.8 sec        ======================Micro/Rad/Cardio=================  Telemtry: Reviewed   EKG: Reviewed  CXR: Reviewed  Culture: Reviewed     ======================================================  PAST MEDICAL & SURGICAL HISTORY:  Hypothyroid    Afib    HLD (hyperlipidemia)    HTN (hypertension)    History of appendectomy    H/O cardiac pacemaker

## 2020-11-21 LAB
ALBUMIN SERPL ELPH-MCNC: 3.7 G/DL — SIGNIFICANT CHANGE UP (ref 3.3–5)
ALP SERPL-CCNC: 71 U/L — SIGNIFICANT CHANGE UP (ref 40–120)
ALT FLD-CCNC: 24 U/L — SIGNIFICANT CHANGE UP (ref 10–45)
ANION GAP SERPL CALC-SCNC: 13 MMOL/L — SIGNIFICANT CHANGE UP (ref 5–17)
APTT BLD: 29.8 SEC — SIGNIFICANT CHANGE UP (ref 27.5–35.5)
APTT BLD: 41.3 SEC — HIGH (ref 27.5–35.5)
APTT BLD: >200 SEC — CRITICAL HIGH (ref 27.5–35.5)
AST SERPL-CCNC: 20 U/L — SIGNIFICANT CHANGE UP (ref 10–40)
BILIRUB SERPL-MCNC: 0.3 MG/DL — SIGNIFICANT CHANGE UP (ref 0.2–1.2)
BUN SERPL-MCNC: 32 MG/DL — HIGH (ref 7–23)
CALCIUM SERPL-MCNC: 9.6 MG/DL — SIGNIFICANT CHANGE UP (ref 8.4–10.5)
CHLORIDE SERPL-SCNC: 104 MMOL/L — SIGNIFICANT CHANGE UP (ref 96–108)
CO2 SERPL-SCNC: 25 MMOL/L — SIGNIFICANT CHANGE UP (ref 22–31)
CREAT SERPL-MCNC: 1.88 MG/DL — HIGH (ref 0.5–1.3)
GLUCOSE SERPL-MCNC: 116 MG/DL — HIGH (ref 70–99)
HCT VFR BLD CALC: 39.6 % — SIGNIFICANT CHANGE UP (ref 34.5–45)
HGB BLD-MCNC: 12.4 G/DL — SIGNIFICANT CHANGE UP (ref 11.5–15.5)
MAGNESIUM SERPL-MCNC: 2.1 MG/DL — SIGNIFICANT CHANGE UP (ref 1.6–2.6)
MCHC RBC-ENTMCNC: 29.4 PG — SIGNIFICANT CHANGE UP (ref 27–34)
MCHC RBC-ENTMCNC: 31.3 GM/DL — LOW (ref 32–36)
MCV RBC AUTO: 93.8 FL — SIGNIFICANT CHANGE UP (ref 80–100)
NRBC # BLD: 0 /100 WBCS — SIGNIFICANT CHANGE UP (ref 0–0)
PHOSPHATE SERPL-MCNC: 3.5 MG/DL — SIGNIFICANT CHANGE UP (ref 2.5–4.5)
PLATELET # BLD AUTO: 310 K/UL — SIGNIFICANT CHANGE UP (ref 150–400)
POTASSIUM SERPL-MCNC: 3.9 MMOL/L — SIGNIFICANT CHANGE UP (ref 3.5–5.3)
POTASSIUM SERPL-SCNC: 3.9 MMOL/L — SIGNIFICANT CHANGE UP (ref 3.5–5.3)
PROT SERPL-MCNC: 6.2 G/DL — SIGNIFICANT CHANGE UP (ref 6–8.3)
RBC # BLD: 4.22 M/UL — SIGNIFICANT CHANGE UP (ref 3.8–5.2)
RBC # FLD: 13 % — SIGNIFICANT CHANGE UP (ref 10.3–14.5)
SODIUM SERPL-SCNC: 142 MMOL/L — SIGNIFICANT CHANGE UP (ref 135–145)
WBC # BLD: 7.39 K/UL — SIGNIFICANT CHANGE UP (ref 3.8–10.5)
WBC # FLD AUTO: 7.39 K/UL — SIGNIFICANT CHANGE UP (ref 3.8–10.5)

## 2020-11-21 PROCEDURE — 93010 ELECTROCARDIOGRAM REPORT: CPT

## 2020-11-21 PROCEDURE — 99233 SBSQ HOSP IP/OBS HIGH 50: CPT | Mod: GC

## 2020-11-21 PROCEDURE — 99232 SBSQ HOSP IP/OBS MODERATE 35: CPT

## 2020-11-21 RX ADMIN — Medication 75 MICROGRAM(S): at 06:00

## 2020-11-21 RX ADMIN — Medication 40 MILLIGRAM(S): at 18:20

## 2020-11-21 RX ADMIN — HEPARIN SODIUM 7500 UNIT(S): 5000 INJECTION INTRAVENOUS; SUBCUTANEOUS at 11:32

## 2020-11-21 RX ADMIN — PITAVASTATIN CALCIUM 2 MILLIGRAM(S): 1.04 TABLET, FILM COATED ORAL at 22:18

## 2020-11-21 RX ADMIN — HEPARIN SODIUM 1800 UNIT(S)/HR: 5000 INJECTION INTRAVENOUS; SUBCUTANEOUS at 11:27

## 2020-11-21 RX ADMIN — CEFEPIME 100 MILLIGRAM(S): 1 INJECTION, POWDER, FOR SOLUTION INTRAMUSCULAR; INTRAVENOUS at 12:32

## 2020-11-21 RX ADMIN — HEPARIN SODIUM 1400 UNIT(S)/HR: 5000 INJECTION INTRAVENOUS; SUBCUTANEOUS at 01:43

## 2020-11-21 RX ADMIN — HEPARIN SODIUM 0 UNIT(S)/HR: 5000 INJECTION INTRAVENOUS; SUBCUTANEOUS at 18:45

## 2020-11-21 RX ADMIN — TEMAZEPAM 30 MILLIGRAM(S): 15 CAPSULE ORAL at 22:17

## 2020-11-21 RX ADMIN — HEPARIN SODIUM 1500 UNIT(S)/HR: 5000 INJECTION INTRAVENOUS; SUBCUTANEOUS at 19:51

## 2020-11-21 RX ADMIN — CEFEPIME 100 MILLIGRAM(S): 1 INJECTION, POWDER, FOR SOLUTION INTRAMUSCULAR; INTRAVENOUS at 22:49

## 2020-11-21 NOTE — PROVIDER CONTACT NOTE (OTHER) - BACKGROUND
Pt admitted for unspecified complication of cardiac and vascular prosthetic device, implant and graft

## 2020-11-21 NOTE — PROGRESS NOTE ADULT - PROBLEM SELECTOR PLAN 4
watery diarrhea w/ hx of clindamycin  - c.diff toxin A and B negative, pending C Diff PCR result  - GI PCR pending watery diarrhea w/ hx of clindamycin  - c.diff toxin A and B negative   - GI PCR pending

## 2020-11-21 NOTE — PROGRESS NOTE ADULT - SUBJECTIVE AND OBJECTIVE BOX
24H hour events: Patient without complaints. Diarrhea improving.    MEDICATIONS:  heparin   Injectable 7500 Unit(s) IV Push every 6 hours PRN  heparin   Injectable 3500 Unit(s) IV Push every 6 hours PRN  heparin  Infusion. 1400 Unit(s)/Hr IV Continuous <Continuous>  sotalol 40 milliGRAM(s) Oral two times a day  cefepime   IVPB 1000 milliGRAM(s) IV Intermittent every 12 hours  temazepam 30 milliGRAM(s) Oral at bedtime PRN  levothyroxine 50 MICROGram(s) Oral <User Schedule>  levothyroxine 75 MICROGram(s) Oral <User Schedule>  pitavastatin 2 milliGRAM(s) Oral daily  benzocaine 15 mG/menthol 3.6 mG (Sugar-Free) Lozenge 1 Lozenge Oral four times a day PRN  chlorhexidine 2% Cloths 1 Application(s) Topical at bedtime      REVIEW OF SYSTEMS:  See HPI, otherwise ROS negative.    PHYSICAL EXAM:  T(C): 36.6 (11-21-20 @ 05:07), Max: 36.6 (11-21-20 @ 00:15)  HR: 44 (11-21-20 @ 05:07) (44 - 52)  BP: 140/67 (11-21-20 @ 05:07) (121/71 - 152/75)  RR: 17 (11-21-20 @ 05:07) (16 - 17)  SpO2: 98% (11-21-20 @ 05:07) (94% - 98%)  Wt(kg): --  I&O's Summary    20 Nov 2020 07:01  -  21 Nov 2020 07:00  --------------------------------------------------------  IN: 1270 mL / OUT: 200 mL / NET: 1070 mL    21 Nov 2020 07:01  -  21 Nov 2020 12:13  --------------------------------------------------------  IN: 360 mL / OUT: 400 mL / NET: -40 mL        Appearance: Alert. NAD	  Cardiovascular: +S1S2 RRR no m/g/r  Respiratory: CTA B/L	  Psychiatry: A & O x 3, Mood & affect appropriate  Neurologic: Non-focal  Extremities: No edema BLE  Vascular: Peripheral pulses palpable 2+ bilaterally      LABS:	 	    CBC Full  -  ( 21 Nov 2020 09:13 )  WBC Count : 7.39 K/uL  Hemoglobin : 12.4 g/dL  Hematocrit : 39.6 %  Platelet Count - Automated : 310 K/uL  Mean Cell Volume : 93.8 fl  Mean Cell Hemoglobin : 29.4 pg  Mean Cell Hemoglobin Concentration : 31.3 gm/dL    11-21    142  |  104  |  32<H>  ----------------------------<  116<H>  3.9   |  25  |  1.88<H>  11-20    140  |  101  |  28<H>  ----------------------------<  111<H>  3.7   |  26  |  1.95<H>    Ca    9.6      21 Nov 2020 09:13  Ca    9.6      20 Nov 2020 16:51  Phos  3.5     11-21  Phos  3.9     11-20  Mg     2.1     11-21  Mg     2.2     11-20    TPro  6.2  /  Alb  3.7  /  TBili  0.3  /  DBili  x   /  AST  20  /  ALT  24  /  AlkPhos  71  11-21    TELEMETRY: SR 38-60's bpm; ~1minute fo AF overnight  	    ECG:  QTc ~430ms	    	  ASSESSMENT/PLAN: 	  76y/o female h/o HTN, HLD, hypothyroid, PAF on xarelto, SSS, s/p ILR, s/p right sided PPM with capped Biotronik RA/RV leads from 2009, new RA lead 2014, generator change (STJ) 2018 and RV lead revision 11/5/20 p/w pacemaker pocket infection s/p PPM system extraction with rapid Afib s/p DCCV in the OR on 11/17.   1. PPM pocket infection s/p extraction 11/17  2. SSS  3. PAF/PAFL with RVR  4. HADLEY in setting of diarrhea & Vanco--Cr stable & slightly downtrending    --Appreciate ID input. Continue Abx  --Monitor renal function.   --QTc stable. Continue Sotalol 40mg BID with EKG to be checked 2hrs after each dose  --Plans for PPM reimplant Monday if remains stable over weekend. Keep NPO after MN SUnday night. Type & Screen Sunday morning  --Recommend  to begin set up of wound vac management/services as outpatient (to allow discharge within 24hours of post PPM reimplant)

## 2020-11-21 NOTE — PROGRESS NOTE ADULT - ASSESSMENT
75y F PMHx HTN, HLD, hypothyroid, afib (on eliquis), sinus bradycardia s/p PPM p/w pacemaker site infection and toe pain.

## 2020-11-21 NOTE — PROGRESS NOTE ADULT - PROBLEM SELECTOR PLAN 10
DVT ppx: heparin gtt  Diet: regular  Dispo: PT consult DVT ppx: heparin gtt  Diet: regular  Dispo: PT consult appreciated.

## 2020-11-21 NOTE — PROGRESS NOTE ADULT - PROBLEM SELECTOR PLAN 6
Left 1st MTP arthralgia. Clinically appears to be gout vs less likely pseudogout given location. Doubt septic arthritis. Doubt IE emboli.   - Received Colchicine 1.2 mg po x1 and 0.6 mg po x2, will hold off on further prophylaxis as it was her first flare

## 2020-11-21 NOTE — PROGRESS NOTE ADULT - PROBLEM SELECTOR PLAN 2
s/p DC CV, HR to 180 and BP to 220s, s/p lopressor 5 mg IV and metoprolol tartrate 25 mg  - Holding sotalol for HR<40. EP notified 11/21 early AM. Will provide further reccomendations  -pending PPM replacement Monday 11/23 s/p DC CV, HR to 180 and BP to 220s, s/p lopressor 5 mg IV and metoprolol tartrate 25 mg  Sotalol 40mg BID per EP. Hold HR<40. EKG 2 hours after monitor qtc please notify with qtc will need to be held if prolonged  -pending PPM replacement Monday 11/23

## 2020-11-21 NOTE — PROGRESS NOTE ADULT - ATTENDING COMMENTS
74y/o female h/o HTN, HLD, hypothyroid, PAF on xarelto, SSS, s/p ILR, s/p right sided PPM with capped Biotronik RA/RV leads from 2009, new RA lead 2014, generator change (STJ) 2018 and RV lead revision 11/5/20 p/w pacemaker pocket infection s/p PPM system extraction with rapid Afib s/p DCCV in the OR on 11/17.   1. PPM pocket infection s/p extraction 11/17  2. SSS  3. PAF/PAFL with RVR  4. HADLEY in setting of diarrhea & Vanco--Cr stable & slightly downtrending    --Appreciate ID input. Continue Abx  --Monitor renal function.   --QTc stable. Continue Sotalol 40mg BID with EKG to be checked 2hrs after each dose  --Plans for PPM reimplant Monday if remains stable over weekend. Keep NPO after MN SUnday night. Type & Screen Sunday morning  --Recommend  to begin set up of wound vac management/services as outpatient (to allow discharge within 24hours of post PPM reimplant)

## 2020-11-21 NOTE — PROGRESS NOTE ADULT - PROBLEM SELECTOR PLAN 1
- PPM site infection, PPM removal/exchange 11/17, PPM reimplantation Monday (11/23/20)  - cultures NGTD  - cefepime (11/16- )  -Vanc discontinued 11/20  - EP and ID following - PPM site infection, PPM removal/exchange 11/17, PPM reimplantation Monday (11/23/20)  - cultures NGTD  - cefepime (11/16- )  - Vanc discontinued 11/20  - EP and ID following

## 2020-11-21 NOTE — PROGRESS NOTE ADULT - ATTENDING COMMENTS
Patient seen and examined today. I agree with the above findings, assessment, and plan with the following additions and exceptions:     C/w cefepime for ppm pocket infection. Wound vac on s/p ppm removal.   Plan for PPM placement on 11/23.   Monitor cx data.  EP recommends continuing sotalol with close monitor of HR and QTc.   Continue tele monitoring.  Diarrhea resolved. GI PCR to be sent.   HADLEY did not respond to IVF. Doubt additional IVF will benefit patient. Can encourage PO intake. PVR not consistent with retention. Could be vanco related toxicity. Repeat BMP in AM.    Rest of plan as above    Dr. Tamir Heck, DO  Attending Physician  Division of Hospital Medicine  [facility]  Pager:  [phone]

## 2020-11-21 NOTE — PROGRESS NOTE ADULT - PROBLEM SELECTOR PLAN 5
elevated Cr (baseline Cr 1) BUN/Cr 15, possible pre-renal vs vanc, zosyn   - UA bland  - urine lytes Cr urine 166 and U Na <35  - avoid NSAIDs, ACE/ARBs, nephrotoxic drugs, and contrast  - strict I/O  - monitor Cr

## 2020-11-21 NOTE — PROGRESS NOTE ADULT - SUBJECTIVE AND OBJECTIVE BOX
Tommie Javed MD  Internal Medicine  Pager #[phone]    Patient is a 75y old  Female who presents with a chief complaint of pacemaker site infection (20 Nov 2020 12:51)      SUBJECTIVE / OVERNIGHT EVENTS:    HR 31 Asx no chest pain/palpitation/dizziness/blurriness of vision. Sotalol held by night team per EP reccs for HR<40. EP covering fellow contacted. No AV ana maria blocking agents, continue to hold sotalol. She will come and evaluate the patient.     Diarrhea improved this morning, no BMs from 12AM 11/21 onward    Good PO intake, urinating without issue    ADDITIONAL REVIEW OF SYSTEMS:    CONSTITUTIONAL: No weakness, fevers or chills  EYES/ENT: No visual changes;  No vertigo or throat pain.  NECK: No pain or stiffness.  RESPIRATORY: No cough, wheezing, hemoptysis; No shortness of breath.  CARDIOVASCULAR: No chest pain or palpitations  GASTROINTESTINAL: No abdominal pain. No nausea, vomiting, diarrhea, constipation, or melena.  GENITOURINARY: No dysuria, frequency or hematuria  NEUROLOGICAL: No numbness or weakness  SKIN: No itching, burning, rashes, or lesions   All other review of systems is negative unless indicated above.    MEDICATIONS  (STANDING):  cefepime   IVPB 1000 milliGRAM(s) IV Intermittent every 12 hours  chlorhexidine 2% Cloths 1 Application(s) Topical at bedtime  heparin  Infusion. 1400 Unit(s)/Hr (14 mL/Hr) IV Continuous <Continuous>  levothyroxine 50 MICROGram(s) Oral <User Schedule>  levothyroxine 75 MICROGram(s) Oral <User Schedule>  pitavastatin 2 milliGRAM(s) Oral daily  sotalol 40 milliGRAM(s) Oral two times a day    MEDICATIONS  (PRN):  benzocaine 15 mG/menthol 3.6 mG (Sugar-Free) Lozenge 1 Lozenge Oral four times a day PRN Sore Throat  heparin   Injectable 7500 Unit(s) IV Push every 6 hours PRN For aPTT less than 40  heparin   Injectable 3500 Unit(s) IV Push every 6 hours PRN For aPTT between 40 - 57  temazepam 30 milliGRAM(s) Oral at bedtime PRN Insomnia      CAPILLARY BLOOD GLUCOSE        I&O's Summary    20 Nov 2020 07:01  -  21 Nov 2020 07:00  --------------------------------------------------------  IN: 1270 mL / OUT: 200 mL / NET: 1070 mL        PHYSICAL EXAM:  Vital Signs Last 24 Hrs  T(C): 36.6 (21 Nov 2020 05:07), Max: 36.8 (20 Nov 2020 12:03)  T(F): 97.8 (21 Nov 2020 05:07), Max: 98.2 (20 Nov 2020 12:03)  HR: 44 (21 Nov 2020 05:07) (44 - 52)  BP: 140/67 (21 Nov 2020 05:07) (119/67 - 152/75)  BP(mean): 99 (20 Nov 2020 11:25) (98 - 110)  RR: 17 (21 Nov 2020 05:07) (16 - 20)  SpO2: 98% (21 Nov 2020 05:07) (94% - 98%)    CONSTITUTIONAL: NAD, well-developed  RESPIRATORY: Normal respiratory effort; lungs are clear to auscultation bilaterally  CARDIOVASCULAR: Regular rate, normal S1 and S2, no murmur/rub/gallop; No lower extremity edema; Peripheral pulses are 2+ bilaterally  ABDOMEN: Nontender to palpation, normoactive bowel sounds, no rebound/guarding; No hepatosplenomegaly  MUSCLOSKELETAL: no cyanosis of digits; no joint swelling or tenderness to palpation, full strength all extremities.  NEURO: No focal deficits, CN II-XII grossly intact b/l; sensation to light touch intact in all extremities, gait intact.  PSYCH: A+O to person, place, and time; affect appropriate.    LABS:                  11/21 labs pending     RADIOLOGY & ADDITIONAL TESTS:  Results Reviewed:   Imaging Personally Reviewed:  Electrocardiogram Personally Reviewed:    COORDINATION OF CARE:  Care Discussed with Consultants/Other Providers [Y/N]:   Prior or Outpatient Records Reviewed [Y/N]:      Tommie Javed MD  Internal Medicine  Pager #[phone]    Patient is a 75y old  Female who presents with a chief complaint of pacemaker site infection (20 Nov 2020 12:51)      SUBJECTIVE / OVERNIGHT EVENTS:    HR 31 Asx no chest pain/palpitation/dizziness/blurriness of vision. Sotalol held by night team per EP reccs for HR<40. EP covering fellow contacted and recommend continuing sotalol for now.     Diarrhea improved this morning, no BMs from 12AM 11/21 onward    Good PO intake, urinating without issue    ADDITIONAL REVIEW OF SYSTEMS:  12 point ros negative except for above    MEDICATIONS  (STANDING):  cefepime   IVPB 1000 milliGRAM(s) IV Intermittent every 12 hours  chlorhexidine 2% Cloths 1 Application(s) Topical at bedtime  heparin  Infusion. 1400 Unit(s)/Hr (14 mL/Hr) IV Continuous <Continuous>  levothyroxine 50 MICROGram(s) Oral <User Schedule>  levothyroxine 75 MICROGram(s) Oral <User Schedule>  pitavastatin 2 milliGRAM(s) Oral daily  sotalol 40 milliGRAM(s) Oral two times a day    MEDICATIONS  (PRN):  benzocaine 15 mG/menthol 3.6 mG (Sugar-Free) Lozenge 1 Lozenge Oral four times a day PRN Sore Throat  heparin   Injectable 7500 Unit(s) IV Push every 6 hours PRN For aPTT less than 40  heparin   Injectable 3500 Unit(s) IV Push every 6 hours PRN For aPTT between 40 - 57  temazepam 30 milliGRAM(s) Oral at bedtime PRN Insomnia      CAPILLARY BLOOD GLUCOSE        I&O's Summary    20 Nov 2020 07:01  -  21 Nov 2020 07:00  --------------------------------------------------------  IN: 1270 mL / OUT: 200 mL / NET: 1070 mL        PHYSICAL EXAM:  Vital Signs Last 24 Hrs  T(C): 36.6 (21 Nov 2020 05:07), Max: 36.8 (20 Nov 2020 12:03)  T(F): 97.8 (21 Nov 2020 05:07), Max: 98.2 (20 Nov 2020 12:03)  HR: 44 (21 Nov 2020 05:07) (44 - 52)  BP: 140/67 (21 Nov 2020 05:07) (119/67 - 152/75)  BP(mean): 99 (20 Nov 2020 11:25) (98 - 110)  RR: 17 (21 Nov 2020 05:07) (16 - 20)  SpO2: 98% (21 Nov 2020 05:07) (94% - 98%)    CONSTITUTIONAL: NAD, well-developed  RESPIRATORY: Normal respiratory effort; lungs are clear to auscultation bilaterally  CARDIOVASCULAR: Regular rate, normal S1 and S2, no murmur/rub/gallop; No lower extremity edema; Peripheral pulses are 2+ bilaterally  ABDOMEN: Nontender to palpation, normoactive bowel sounds, no rebound/guarding; No hepatosplenomegaly  MUSCLOSKELETAL: no cyanosis of digits; no joint swelling or tenderness to palpation, full strength all extremities.  NEURO: No focal deficits, CN II-XII grossly intact b/l; sensation to light touch intact in all extremities, gait intact.  PSYCH: A+O to person, place, and time; affect appropriate.  Skin: right chest wound vac in place.     LABS:                  11/21 labs pending     RADIOLOGY & ADDITIONAL TESTS:  Results Reviewed:   Imaging Personally Reviewed:  Electrocardiogram Personally Reviewed:    COORDINATION OF CARE:  Care Discussed with Consultants/Other Providers [Y/N]:   Prior or Outpatient Records Reviewed [Y/N]:

## 2020-11-22 LAB
ANION GAP SERPL CALC-SCNC: 13 MMOL/L — SIGNIFICANT CHANGE UP (ref 5–17)
APTT BLD: 102.6 SEC — HIGH (ref 27.5–35.5)
APTT BLD: 54.4 SEC — HIGH (ref 27.5–35.5)
APTT BLD: 80.3 SEC — HIGH (ref 27.5–35.5)
BLD GP AB SCN SERPL QL: NEGATIVE — SIGNIFICANT CHANGE UP
BUN SERPL-MCNC: 33 MG/DL — HIGH (ref 7–23)
CALCIUM SERPL-MCNC: 9.5 MG/DL — SIGNIFICANT CHANGE UP (ref 8.4–10.5)
CHLORIDE SERPL-SCNC: 104 MMOL/L — SIGNIFICANT CHANGE UP (ref 96–108)
CO2 SERPL-SCNC: 25 MMOL/L — SIGNIFICANT CHANGE UP (ref 22–31)
CREAT SERPL-MCNC: 1.78 MG/DL — HIGH (ref 0.5–1.3)
CULTURE RESULTS: SIGNIFICANT CHANGE UP
CULTURE RESULTS: SIGNIFICANT CHANGE UP
GLUCOSE SERPL-MCNC: 140 MG/DL — HIGH (ref 70–99)
HCT VFR BLD CALC: 37.4 % — SIGNIFICANT CHANGE UP (ref 34.5–45)
HGB BLD-MCNC: 12.1 G/DL — SIGNIFICANT CHANGE UP (ref 11.5–15.5)
MAGNESIUM SERPL-MCNC: 2.1 MG/DL — SIGNIFICANT CHANGE UP (ref 1.6–2.6)
MCHC RBC-ENTMCNC: 30 PG — SIGNIFICANT CHANGE UP (ref 27–34)
MCHC RBC-ENTMCNC: 32.4 GM/DL — SIGNIFICANT CHANGE UP (ref 32–36)
MCV RBC AUTO: 92.8 FL — SIGNIFICANT CHANGE UP (ref 80–100)
MRSA PCR RESULT.: SIGNIFICANT CHANGE UP
NRBC # BLD: 0 /100 WBCS — SIGNIFICANT CHANGE UP (ref 0–0)
PHOSPHATE SERPL-MCNC: 4 MG/DL — SIGNIFICANT CHANGE UP (ref 2.5–4.5)
PLATELET # BLD AUTO: 314 K/UL — SIGNIFICANT CHANGE UP (ref 150–400)
POTASSIUM SERPL-MCNC: 4.4 MMOL/L — SIGNIFICANT CHANGE UP (ref 3.5–5.3)
POTASSIUM SERPL-SCNC: 4.4 MMOL/L — SIGNIFICANT CHANGE UP (ref 3.5–5.3)
RBC # BLD: 4.03 M/UL — SIGNIFICANT CHANGE UP (ref 3.8–5.2)
RBC # FLD: 12.9 % — SIGNIFICANT CHANGE UP (ref 10.3–14.5)
RH IG SCN BLD-IMP: POSITIVE — SIGNIFICANT CHANGE UP
S AUREUS DNA NOSE QL NAA+PROBE: SIGNIFICANT CHANGE UP
SODIUM SERPL-SCNC: 142 MMOL/L — SIGNIFICANT CHANGE UP (ref 135–145)
SPECIMEN SOURCE: SIGNIFICANT CHANGE UP
SPECIMEN SOURCE: SIGNIFICANT CHANGE UP
WBC # BLD: 8.2 K/UL — SIGNIFICANT CHANGE UP (ref 3.8–10.5)
WBC # FLD AUTO: 8.2 K/UL — SIGNIFICANT CHANGE UP (ref 3.8–10.5)

## 2020-11-22 PROCEDURE — 93010 ELECTROCARDIOGRAM REPORT: CPT

## 2020-11-22 PROCEDURE — 99233 SBSQ HOSP IP/OBS HIGH 50: CPT | Mod: GC

## 2020-11-22 PROCEDURE — 99232 SBSQ HOSP IP/OBS MODERATE 35: CPT

## 2020-11-22 PROCEDURE — 93010 ELECTROCARDIOGRAM REPORT: CPT | Mod: 76

## 2020-11-22 RX ADMIN — CHLORHEXIDINE GLUCONATE 1 APPLICATION(S): 213 SOLUTION TOPICAL at 08:49

## 2020-11-22 RX ADMIN — HEPARIN SODIUM 3500 UNIT(S): 5000 INJECTION INTRAVENOUS; SUBCUTANEOUS at 12:16

## 2020-11-22 RX ADMIN — HEPARIN SODIUM 1500 UNIT(S)/HR: 5000 INJECTION INTRAVENOUS; SUBCUTANEOUS at 19:30

## 2020-11-22 RX ADMIN — Medication 50 MICROGRAM(S): at 06:00

## 2020-11-22 RX ADMIN — Medication 40 MILLIGRAM(S): at 18:12

## 2020-11-22 RX ADMIN — CEFEPIME 100 MILLIGRAM(S): 1 INJECTION, POWDER, FOR SOLUTION INTRAMUSCULAR; INTRAVENOUS at 11:32

## 2020-11-22 RX ADMIN — HEPARIN SODIUM 1500 UNIT(S)/HR: 5000 INJECTION INTRAVENOUS; SUBCUTANEOUS at 12:13

## 2020-11-22 RX ADMIN — Medication 40 MILLIGRAM(S): at 06:00

## 2020-11-22 RX ADMIN — CEFEPIME 100 MILLIGRAM(S): 1 INJECTION, POWDER, FOR SOLUTION INTRAMUSCULAR; INTRAVENOUS at 22:11

## 2020-11-22 RX ADMIN — TEMAZEPAM 30 MILLIGRAM(S): 15 CAPSULE ORAL at 22:10

## 2020-11-22 RX ADMIN — HEPARIN SODIUM 1300 UNIT(S)/HR: 5000 INJECTION INTRAVENOUS; SUBCUTANEOUS at 02:00

## 2020-11-22 RX ADMIN — PITAVASTATIN CALCIUM 2 MILLIGRAM(S): 1.04 TABLET, FILM COATED ORAL at 22:11

## 2020-11-22 NOTE — CHART NOTE - NSCHARTNOTEFT_GEN_A_CORE
Pt's case was discussed with MAR and senior night resident given uncertainty of when to call EP hs given patient remained stable/asymptomatic and treatment is pacemaker which is planned for Monday. Pt broke and went back to NSR when provider was downstairs for EKG, so EP call was deferred. Will continue to monitor closely.

## 2020-11-22 NOTE — PROGRESS NOTE ADULT - ASSESSMENT
76y/o female h/o HTN, HLD, hypothyroid, PAF on Xarelto, SSS, s/p ILR, s/p right sided PPM with capped Biotronik RA/RV leads from 2009, new RA lead 2014, generator change (STJ) 2018 and RV lead revision 11/5/20 p/w pacemaker pocket infection s/p PPM system extraction with rapid Afib s/p DCCV in the OR on 11/17.   1. PPM pocket infection s/p extraction 11/17  2. SSS  3. PAF/PAFL with RVR  4. HADLEY in setting of diarrhea & Vanco--Cr stable & slightly downtrending    --Appreciate ID input. Continue Abx  --Monitor renal function.   --QTc stable. Continue Sotalol 40mg BID with EKG to be checked 2hrs after each dose  --Plans for PPM reimplant Monday. Keep NPO after MN Sunday night (discussed with resident). Type & Screen Sunday morning.  Heparin to be stopped at 3AM  --Recommend  to begin set up of wound vac management/services as outpatient (to allow discharge within 24hours of post PPM reimplant)

## 2020-11-22 NOTE — PROGRESS NOTE ADULT - PROBLEM SELECTOR PLAN 4
watery diarrhea w/ hx of clindamycin, seems to have resolved   - c.diff toxin A and B negative watery diarrhea w/ hx of clindamycin, seems to have resolved   - c.diff toxin A and B negative  - Pending GI PCR today

## 2020-11-22 NOTE — PROGRESS NOTE ADULT - PROBLEM SELECTOR PLAN 2
s/p DC CV, HR to 180 and BP to 220s, s/p lopressor 5 mg IV and metoprolol tartrate 25 mg  Sotalol 40mg BID per EP. Hold if HR<40. EKG 2 hours after monitor qtc please notify with qtc will need to be held if prolonged  -pending PPM replacement Monday 11/23 s/p DC CV, HR to 180 and BP to 220s, s/p lopressor 5 mg IV and metoprolol tartrate 25 mg  Sotalol 40mg BID per EP. Hold if HR<40. EKG 2 hours after monitor qtc please notify with qtc will need to be held if prolonged. QTC today in the 480s.  -pending PPM replacement Monday 11/23

## 2020-11-22 NOTE — PROGRESS NOTE ADULT - SUBJECTIVE AND OBJECTIVE BOX
24H hour events: Patient without complaints.  Wants to get reimplant performed tomorrow. Sitting in chair and appears comfortable.  Wound vac functioning.     MEDICATIONS:  heparin   Injectable 7500 Unit(s) IV Push every 6 hours PRN  heparin   Injectable 3500 Unit(s) IV Push every 6 hours PRN  heparin  Infusion. 1400 Unit(s)/Hr IV Continuous <Continuous>  sotalol 40 milliGRAM(s) Oral two times a day  cefepime   IVPB 1000 milliGRAM(s) IV Intermittent every 12 hours  temazepam 30 milliGRAM(s) Oral at bedtime PRN  levothyroxine 50 MICROGram(s) Oral <User Schedule>  levothyroxine 75 MICROGram(s) Oral <User Schedule>  pitavastatin 2 milliGRAM(s) Oral daily  benzocaine 15 mG/menthol 3.6 mG (Sugar-Free) Lozenge 1 Lozenge Oral four times a day PRN  chlorhexidine 2% Cloths 1 Application(s) Topical at bedtime      REVIEW OF SYSTEMS:  See HPI, otherwise ROS negative.    PHYSICAL EXAM:  T(C): 36.6 (11-21-20 @ 05:07), Max: 36.6 (11-21-20 @ 00:15)  HR: 44 (11-21-20 @ 05:07) (44 - 52)  BP: 140/67 (11-21-20 @ 05:07) (121/71 - 152/75)  RR: 17 (11-21-20 @ 05:07) (16 - 17)  SpO2: 98% (11-21-20 @ 05:07) (94% - 98%)  Wt(kg): --  I&O's Summary    20 Nov 2020 07:01  -  21 Nov 2020 07:00  --------------------------------------------------------  IN: 1270 mL / OUT: 200 mL / NET: 1070 mL    21 Nov 2020 07:01  -  21 Nov 2020 12:13  --------------------------------------------------------  IN: 360 mL / OUT: 400 mL / NET: -40 mL        Appearance: Alert. NAD	  Cardiovascular: +S1S2 RRR no m/g/r  Respiratory: CTA B/L	  Psychiatry: A & O x 3, Mood & affect appropriate  Neurologic: Non-focal  Extremities: No edema BLE  Vascular: Peripheral pulses palpable 2+ bilaterally      LABS:	 	  Complete Blood Count in AM (11.22.20 @ 09:01)   Nucleated RBC: 0 /100 WBCs   WBC Count: 8.20 K/uL   RBC Count: 4.03 M/uL   Hemoglobin: 12.1 g/dL   Hematocrit: 37.4 %   Mean Cell Volume: 92.8 fl   Mean Cell Hemoglobin: 30.0 pg   Mean Cell Hemoglobin Conc: 32.4 gm/dL   Red Cell Distrib Width: 12.9 %   Platelet Count - Automated: 314 K/uL Basic Metabolic Panel in AM (11.22.20 @ 09:01)   Sodium, Serum: 142 mmol/L   Potassium, Serum: 4.4 mmol/L   Chloride, Serum: 104 mmol/L   Carbon Dioxide, Serum: 25 mmol/L   Anion Gap, Serum: 13 mmol/L   Blood Urea Nitrogen, Serum: 33 mg/dL   Creatinine, Serum: 1.78 mg/dL   Glucose, Serum: 140 mg/dL   Calcium, Total Serum: 9.5 mg/dL   eGFR if Non : 28: Interpretative comment   The units for eGFR are mL/min/1.73M2 (normalized body surface area). The   eGFR is calculated from a serum creatinine using the CKD-EPI equation.   Other variables required for calculation are race, age and sex. Among   patients with chronic kidney disease (CKD), the eGFR is useful in   determining the stage of disease according to KDOQI CKD classification.   All eGFR results are reported numerically with the following   interpretation.   GFR With Without   (ml/min/1.73 m2) Kidney Damage Kidney Damage   >= 90 Stage 1 Normal   60-89 Stage 2 Decreased GFR   30-59 Stage 3 Stage 3   15-29 Stage 4 Stage 4   < 15 Stage 5 Stage 5   CBC Full  -  ( 21 Nov 2020 09:13 )  WBC Count : 7.39 K/uL  Hemoglobin : 12.4 g/dL  Hematocrit : 39.6 %  Platelet Count - Automated : 310 K/uL  Mean Cell Volume : 93.8 fl  Mean Cell Hemoglobin : 29.4 pg  Mean Cell Hemoglobin Concentration : 31.3 gm/dL    11-21    142  |  104  |  32<H>  ----------------------------<  116<H>  3.9   |  25  |  1.88<H>  11-20    140  |  101  |  28<H>  ----------------------------<  111<H>  3.7   |  26  |  1.95<H>    Ca    9.6      21 Nov 2020 09:13  Ca    9.6      20 Nov 2020 16:51  Phos  3.5     11-21  Phos  3.9     11-20  Mg     2.1     11-21  Mg     2.2     11-20    TPro  6.2  /  Alb  3.7  /  TBili  0.3  /  DBili  x   /  AST  20  /  ALT  24  /  AlkPhos  71  11-21    TELEMETRY: SR 38-60's bpm; ~1minute fo AF overnight  	    ECG:  QTc ~430ms	    	  ASSESSMENT/PLAN: 	  74y/o female h/o HTN, HLD, hypothyroid, PAF on Xarelto, SSS, s/p ILR, s/p right sided PPM with capped Biotronik RA/RV leads from 2009, new RA lead 2014, generator change (STJ) 2018 and RV lead revision 11/5/20 p/w pacemaker pocket infection s/p PPM system extraction with rapid Afib s/p DCCV in the OR on 11/17.   1. PPM pocket infection s/p extraction 11/17  2. SSS  3. PAF/PAFL with RVR  4. HADLEY in setting of diarrhea & Vanco--Cr stable & slightly downtrending    --Appreciate ID input. Continue Abx  --Monitor renal function.   --QTc stable. Continue Sotalol 40mg BID with EKG to be checked 2hrs after each dose  --Plans for PPM reimplant Monday. Keep NPO after MN Sunday night (discussed with resident). Type & Screen Kurt morning.  Heparin to be stopped at 3AM  --Recommend  to begin set up of wound vac management/services as outpatient (to allow discharge within 24hours of post PPM reimplant)       24H hour events: Patient without complaints.  Wants to get reimplant performed tomorrow. Sitting in chair and appears comfortable.  Wound vac functioning.     MEDICATIONS:  heparin   Injectable 7500 Unit(s) IV Push every 6 hours PRN  heparin   Injectable 3500 Unit(s) IV Push every 6 hours PRN  heparin  Infusion. 1400 Unit(s)/Hr IV Continuous <Continuous>  sotalol 40 milliGRAM(s) Oral two times a day  cefepime   IVPB 1000 milliGRAM(s) IV Intermittent every 12 hours  temazepam 30 milliGRAM(s) Oral at bedtime PRN  levothyroxine 50 MICROGram(s) Oral <User Schedule>  levothyroxine 75 MICROGram(s) Oral <User Schedule>  pitavastatin 2 milliGRAM(s) Oral daily  benzocaine 15 mG/menthol 3.6 mG (Sugar-Free) Lozenge 1 Lozenge Oral four times a day PRN  chlorhexidine 2% Cloths 1 Application(s) Topical at bedtime      REVIEW OF SYSTEMS:  See HPI, otherwise ROS negative.    PHYSICAL EXAM:  T(C): 36.6 (11-21-20 @ 05:07), Max: 36.6 (11-21-20 @ 00:15)  HR: 44 (11-21-20 @ 05:07) (44 - 52)  BP: 140/67 (11-21-20 @ 05:07) (121/71 - 152/75)  RR: 17 (11-21-20 @ 05:07) (16 - 17)  SpO2: 98% (11-21-20 @ 05:07) (94% - 98%)  Wt(kg): --  I&O's Summary    20 Nov 2020 07:01  -  21 Nov 2020 07:00  --------------------------------------------------------  IN: 1270 mL / OUT: 200 mL / NET: 1070 mL    21 Nov 2020 07:01  -  21 Nov 2020 12:13  --------------------------------------------------------  IN: 360 mL / OUT: 400 mL / NET: -40 mL        Appearance: Alert. NAD	  Cardiovascular: +S1S2 RRR no m/g/r  Respiratory: CTA B/L	  Psychiatry: A & O x 3, Mood & affect appropriate  Neurologic: Non-focal  Extremities: No edema BLE  Vascular: Peripheral pulses palpable 2+ bilaterally      LABS:	 	  Complete Blood Count in AM (11.22.20 @ 09:01)   Nucleated RBC: 0 /100 WBCs   WBC Count: 8.20 K/uL   RBC Count: 4.03 M/uL   Hemoglobin: 12.1 g/dL   Hematocrit: 37.4 %   Mean Cell Volume: 92.8 fl   Mean Cell Hemoglobin: 30.0 pg   Mean Cell Hemoglobin Conc: 32.4 gm/dL   Red Cell Distrib Width: 12.9 %   Platelet Count - Automated: 314 K/uL Basic Metabolic Panel in AM (11.22.20 @ 09:01)   Sodium, Serum: 142 mmol/L   Potassium, Serum: 4.4 mmol/L   Chloride, Serum: 104 mmol/L   Carbon Dioxide, Serum: 25 mmol/L   Anion Gap, Serum: 13 mmol/L   Blood Urea Nitrogen, Serum: 33 mg/dL   Creatinine, Serum: 1.78 mg/dL   Glucose, Serum: 140 mg/dL   Calcium, Total Serum: 9.5 mg/dL   eGFR if Non : 28: Interpretative comment   The units for eGFR are mL/min/1.73M2 (normalized body surface area). The   eGFR is calculated from a serum creatinine using the CKD-EPI equation.   Other variables required for calculation are race, age and sex. Among   patients with chronic kidney disease (CKD), the eGFR is useful in   determining the stage of disease according to KDOQI CKD classification.   All eGFR results are reported numerically with the following   interpretation.   GFR With Without   (ml/min/1.73 m2) Kidney Damage Kidney Damage   >= 90 Stage 1 Normal   60-89 Stage 2 Decreased GFR   30-59 Stage 3 Stage 3   15-29 Stage 4 Stage 4   < 15 Stage 5 Stage 5   CBC Full  -  ( 21 Nov 2020 09:13 )  WBC Count : 7.39 K/uL  Hemoglobin : 12.4 g/dL  Hematocrit : 39.6 %  Platelet Count - Automated : 310 K/uL  Mean Cell Volume : 93.8 fl  Mean Cell Hemoglobin : 29.4 pg  Mean Cell Hemoglobin Concentration : 31.3 gm/dL    11-21    142  |  104  |  32<H>  ----------------------------<  116<H>  3.9   |  25  |  1.88<H>  11-20    140  |  101  |  28<H>  ----------------------------<  111<H>  3.7   |  26  |  1.95<H>    Ca    9.6      21 Nov 2020 09:13  Ca    9.6      20 Nov 2020 16:51  Phos  3.5     11-21  Phos  3.9     11-20  Mg     2.1     11-21  Mg     2.2     11-20    TPro  6.2  /  Alb  3.7  /  TBili  0.3  /  DBili  x   /  AST  20  /  ALT  24  /  AlkPhos  71  11-21    TELEMETRY: SB 35-50 AF up to 130s  	    ECG:  QTc ~430ms	    	  ASSESSMENT/PLAN: 	  76y/o female h/o HTN, HLD, hypothyroid, PAF on Xarelto, SSS, s/p ILR, s/p right sided PPM with capped Biotronik RA/RV leads from 2009, new RA lead 2014, generator change (STJ) 2018 and RV lead revision 11/5/20 p/w pacemaker pocket infection s/p PPM system extraction with rapid Afib s/p DCCV in the OR on 11/17.   1. PPM pocket infection s/p extraction 11/17  2. SSS  3. PAF/PAFL with RVR  4. HADLEY in setting of diarrhea & Vanco--Cr stable & slightly downtrending    --Appreciate ID input. Continue Abx  --Monitor renal function.   --QTc stable. Continue Sotalol 40mg BID with EKG to be checked 2hrs after each dose  --Plans for PPM reimplant Monday. Keep NPO after MN Sunday night (discussed with resident). Type & Screen Kurt morning.  Heparin to be stopped at 3AM  --Recommend  to begin set up of wound vac management/services as outpatient (to allow discharge within 24hours of post PPM reimplant)

## 2020-11-22 NOTE — PROGRESS NOTE ADULT - PROBLEM SELECTOR PLAN 5
elevated Cr (baseline Cr 1) BUN/Cr 15, possible pre-renal vs vanc, zosyn   - UA bland  - urine lytes Cr urine 166 and U Na <35, FeNa .3%, pointing to pre renal cause  - avoid NSAIDs, ACE/ARBs, nephrotoxic drugs, and contrast  - strict I/O  - monitor Cr elevated Cr (baseline Cr 1) BUN/Cr 15, possible pre-renal vs vanc, zosyn. improving  - UA bland  - urine lytes Cr urine 166 and U Na <35, FeNa .3%, pointing to pre renal cause  - avoid NSAIDs, ACE/ARBs, nephrotoxic drugs, and contrast  - strict I/O  - monitor Cr

## 2020-11-22 NOTE — PROGRESS NOTE ADULT - ATTENDING COMMENTS
76y/o female h/o HTN, HLD, hypothyroid, PAF on xarelto, SSS, s/p ILR, s/p right sided PPM with capped Biotronik RA/RV leads from 2009, new RA lead 2014, generator change (STJ) 2018 and RV lead revision 11/5/20 p/w pacemaker pocket infection s/p PPM system extraction with rapid Afib s/p DCCV in the OR on 11/17.   1. PPM pocket infection s/p extraction 11/17  2. SSS  3. PAF/PAFL with RVR  4. HADLEY in setting of diarrhea & Vanco--Cr stable & slightly downtrending    --Appreciate ID input. Continue Abx  --Monitor renal function.   Reimplantation tomorrow.  Patient is NPO after MN.  Heparin to be stopped at 3 AM.  Type and screen.   --QTc stable. Continue Sotalol 40mg BID with EKG to be checked 2hrs after each dose  --Plans for PPM reimplant Monday if remains stable over weekend. Keep NPO after MN Samantha night. Type & Screen Sunday morning  --Recommend  to begin set up of wound vac management/services as outpatient (to allow discharge within 24hours of post PPM reimplant)

## 2020-11-22 NOTE — PROGRESS NOTE ADULT - SUBJECTIVE AND OBJECTIVE BOX
Alli Davidson MD  Internal Medicine  [facility] [id number]    Patient is a 75y old  Female who presents with a chief complaint of pacemaker site infection (20 Nov 2020 12:51)      SUBJECTIVE / OVERNIGHT EVENTS:  Bradycardic, asymptomatic, BP stable. no chest pain/palpitation.  On sotalol.    ADDITIONAL REVIEW OF SYSTEMS:  12 point ros negative except for above    MEDICATIONS  (STANDING):  cefepime   IVPB 1000 milliGRAM(s) IV Intermittent every 12 hours  chlorhexidine 2% Cloths 1 Application(s) Topical at bedtime  heparin  Infusion. 1400 Unit(s)/Hr (14 mL/Hr) IV Continuous <Continuous>  levothyroxine 50 MICROGram(s) Oral <User Schedule>  levothyroxine 75 MICROGram(s) Oral <User Schedule>  pitavastatin 2 milliGRAM(s) Oral daily  sotalol 40 milliGRAM(s) Oral two times a day    MEDICATIONS  (PRN):  benzocaine 15 mG/menthol 3.6 mG (Sugar-Free) Lozenge 1 Lozenge Oral four times a day PRN Sore Throat  heparin   Injectable 7500 Unit(s) IV Push every 6 hours PRN For aPTT less than 40  heparin   Injectable 3500 Unit(s) IV Push every 6 hours PRN For aPTT between 40 - 57  temazepam 30 milliGRAM(s) Oral at bedtime PRN Insomnia      CAPILLARY BLOOD GLUCOSE        I&O's Detail    21 Nov 2020 07:01  -  22 Nov 2020 07:00  --------------------------------------------------------  IN:    Heparin Infusion: 165 mL    Oral Fluid: 600 mL  Total IN: 765 mL    OUT:    Voided (mL): 400 mL  Total OUT: 400 mL    Total NET: 365 mL      PHYSICAL EXAM:  Vital Signs Last 24 Hrs  T(C): 36.4 (22 Nov 2020 05:00), Max: 36.8 (21 Nov 2020 20:23)  T(F): 97.5 (22 Nov 2020 05:00), Max: 98.2 (21 Nov 2020 20:23)  HR: 54 (22 Nov 2020 05:59) (35 - 128)  BP: 168/101 (22 Nov 2020 05:59) (145/73 - 194/73)  BP(mean): --  RR: 18 (22 Nov 2020 05:00) (16 - 18)  SpO2: 95% (22 Nov 2020 05:00) (95% - 99%)    CONSTITUTIONAL: NAD, well-developed  RESPIRATORY: Normal respiratory effort; lungs are clear to auscultation bilaterally  CARDIOVASCULAR: Regular rate, normal S1 and S2, no murmur/rub/gallop; No lower extremity edema; Peripheral pulses are 2+ bilaterally  ABDOMEN: Nontender to palpation, normoactive bowel sounds, no rebound/guarding; No hepatosplenomegaly  MUSCLOSKELETAL: no cyanosis of digits; no joint swelling or tenderness to palpation, full strength all extremities.  NEURO: No focal deficits, CN II-XII grossly intact b/l; sensation to light touch intact in all extremities, gait intact.  PSYCH: A+O to person, place, and time; affect appropriate.  Skin: right chest wound vac in place.     LABS:                                    12.4   7.39  )-----------( 310      ( 21 Nov 2020 09:13 )             39.6   11-21    142  |  104  |  32<H>  ----------------------------<  116<H>  3.9   |  25  |  1.88<H>    Ca    9.6      21 Nov 2020 09:13  Phos  3.5     11-21  Mg     2.1     11-21    TPro  6.2  /  Alb  3.7  /  TBili  0.3  /  DBili  x   /  AST  20  /  ALT  24  /  AlkPhos  71  11-21      RADIOLOGY & ADDITIONAL TESTS:  Results Reviewed:   Imaging Personally Reviewed:  Electrocardiogram Personally Reviewed:    COORDINATION OF CARE:  Care Discussed with Consultants/Other Providers [Y/N]:   Prior or Outpatient Records Reviewed [Y/N]:      Alli Davidson MD  Internal Medicine  [facility] [id number]    Patient is a 75y old  Female who presents with a chief complaint of pacemaker site infection (20 Nov 2020 12:51)      SUBJECTIVE / OVERNIGHT EVENTS:  Bradycardic, asymptomatic, BP stable. no chest pain/palpitation.  On sotalol. Continuing to have watery stools x2 yesterday, on and off. Was okay overnight previously in terms of stools. No fevers or chills. No blood.    ADDITIONAL REVIEW OF SYSTEMS:  12 point ros negative except for above    MEDICATIONS  (STANDING):  cefepime   IVPB 1000 milliGRAM(s) IV Intermittent every 12 hours  chlorhexidine 2% Cloths 1 Application(s) Topical at bedtime  heparin  Infusion. 1400 Unit(s)/Hr (14 mL/Hr) IV Continuous <Continuous>  levothyroxine 50 MICROGram(s) Oral <User Schedule>  levothyroxine 75 MICROGram(s) Oral <User Schedule>  pitavastatin 2 milliGRAM(s) Oral daily  sotalol 40 milliGRAM(s) Oral two times a day    MEDICATIONS  (PRN):  benzocaine 15 mG/menthol 3.6 mG (Sugar-Free) Lozenge 1 Lozenge Oral four times a day PRN Sore Throat  heparin   Injectable 7500 Unit(s) IV Push every 6 hours PRN For aPTT less than 40  heparin   Injectable 3500 Unit(s) IV Push every 6 hours PRN For aPTT between 40 - 57  temazepam 30 milliGRAM(s) Oral at bedtime PRN Insomnia      CAPILLARY BLOOD GLUCOSE        I&O's Detail    21 Nov 2020 07:01  -  22 Nov 2020 07:00  --------------------------------------------------------  IN:    Heparin Infusion: 165 mL    Oral Fluid: 600 mL  Total IN: 765 mL    OUT:    Voided (mL): 400 mL  Total OUT: 400 mL    Total NET: 365 mL      PHYSICAL EXAM:  Vital Signs Last 24 Hrs  T(C): 36.4 (22 Nov 2020 05:00), Max: 36.8 (21 Nov 2020 20:23)  T(F): 97.5 (22 Nov 2020 05:00), Max: 98.2 (21 Nov 2020 20:23)  HR: 54 (22 Nov 2020 05:59) (35 - 128)  BP: 168/101 (22 Nov 2020 05:59) (145/73 - 194/73)  BP(mean): --  RR: 18 (22 Nov 2020 05:00) (16 - 18)  SpO2: 95% (22 Nov 2020 05:00) (95% - 99%)    CONSTITUTIONAL: NAD, well-developed  RESPIRATORY: Normal respiratory effort; lungs are clear to auscultation bilaterally  CARDIOVASCULAR: Regular rate, normal S1 and S2, no murmur/rub/gallop; No lower extremity edema; Peripheral pulses are 2+ bilaterally  ABDOMEN: Nontender to palpation, normoactive bowel sounds, no rebound/guarding; No hepatosplenomegaly  MUSCLOSKELETAL: no cyanosis of digits; no joint swelling or tenderness to palpation, full strength all extremities.  NEURO: No focal deficits, CN II-XII grossly intact b/l; sensation to light touch intact in all extremities, gait intact.  PSYCH: A+O to person, place, and time; affect appropriate.  Skin: right chest wound vac in place.     LABS:                                    12.4   7.39  )-----------( 310      ( 21 Nov 2020 09:13 )             39.6   11-21    142  |  104  |  32<H>  ----------------------------<  116<H>  3.9   |  25  |  1.88<H>    Ca    9.6      21 Nov 2020 09:13  Phos  3.5     11-21  Mg     2.1     11-21    TPro  6.2  /  Alb  3.7  /  TBili  0.3  /  DBili  x   /  AST  20  /  ALT  24  /  AlkPhos  71  11-21      RADIOLOGY & ADDITIONAL TESTS:  Results Reviewed:   Imaging Personally Reviewed:  Electrocardiogram Personally Reviewed:    COORDINATION OF CARE:  Care Discussed with Consultants/Other Providers [Y/N]:   Prior or Outpatient Records Reviewed [Y/N]:

## 2020-11-22 NOTE — PROGRESS NOTE ADULT - PROBLEM SELECTOR PLAN 1
- PPM site infection, PPM removal/exchange 11/17, PPM reimplantation Monday (11/23/20)  - cultures NGTD  - cefepime (11/16- )  - Vanc discontinued 11/20  - EP and ID following

## 2020-11-22 NOTE — PROGRESS NOTE ADULT - ATTENDING COMMENTS
Patient seen and examined today. I agree with the above findings, assessment, and plan with the following additions and exceptions:     C/w cefepime for ppm pocket infection. Wound vac on s/p ppm removal.   Plan for PPM placement on 11/23.   On hep drip. Monitor for bleeding. Hold at 3am on 11/23 for above procedure.   Monitor cx data.  EP recommends continuing sotalol with close monitor of HR and QTc. EP asked to be notified of any changes in vitals.   Continue tele monitoring.  Diarrhea still present. GI PCR to be sent.   Prerenal jeanie from hypovolemia from possibly diarrhea. Encourage PO intake. Cr improving. PVR not consistent with retention. Could be vanco related toxicity.   Rest of plan as above    Dr. Tamir Heck DO  Attending Physician  Division of Hospital Medicine  [facility]  Pager:  [phone]

## 2020-11-23 PROBLEM — I10 ESSENTIAL (PRIMARY) HYPERTENSION: Chronic | Status: ACTIVE | Noted: 2020-11-16

## 2020-11-23 PROBLEM — I48.91 UNSPECIFIED ATRIAL FIBRILLATION: Chronic | Status: ACTIVE | Noted: 2020-11-16

## 2020-11-23 PROBLEM — E03.9 HYPOTHYROIDISM, UNSPECIFIED: Chronic | Status: ACTIVE | Noted: 2020-11-16

## 2020-11-23 PROBLEM — E78.5 HYPERLIPIDEMIA, UNSPECIFIED: Chronic | Status: ACTIVE | Noted: 2020-11-16

## 2020-11-23 LAB
ALBUMIN SERPL ELPH-MCNC: 3.8 G/DL — SIGNIFICANT CHANGE UP (ref 3.3–5)
ALP SERPL-CCNC: 68 U/L — SIGNIFICANT CHANGE UP (ref 40–120)
ALT FLD-CCNC: 24 U/L — SIGNIFICANT CHANGE UP (ref 10–45)
ANION GAP SERPL CALC-SCNC: 11 MMOL/L — SIGNIFICANT CHANGE UP (ref 5–17)
APTT BLD: 34.6 SEC — SIGNIFICANT CHANGE UP (ref 27.5–35.5)
AST SERPL-CCNC: 24 U/L — SIGNIFICANT CHANGE UP (ref 10–40)
BASOPHILS # BLD AUTO: 0.04 K/UL — SIGNIFICANT CHANGE UP (ref 0–0.2)
BASOPHILS NFR BLD AUTO: 0.5 % — SIGNIFICANT CHANGE UP (ref 0–2)
BILIRUB SERPL-MCNC: 0.4 MG/DL — SIGNIFICANT CHANGE UP (ref 0.2–1.2)
BUN SERPL-MCNC: 32 MG/DL — HIGH (ref 7–23)
CALCIUM SERPL-MCNC: 9.8 MG/DL — SIGNIFICANT CHANGE UP (ref 8.4–10.5)
CHLORIDE SERPL-SCNC: 108 MMOL/L — SIGNIFICANT CHANGE UP (ref 96–108)
CO2 SERPL-SCNC: 24 MMOL/L — SIGNIFICANT CHANGE UP (ref 22–31)
CREAT SERPL-MCNC: 1.9 MG/DL — HIGH (ref 0.5–1.3)
CULTURE RESULTS: SIGNIFICANT CHANGE UP
EOSINOPHIL # BLD AUTO: 0.23 K/UL — SIGNIFICANT CHANGE UP (ref 0–0.5)
EOSINOPHIL NFR BLD AUTO: 2.7 % — SIGNIFICANT CHANGE UP (ref 0–6)
GLUCOSE SERPL-MCNC: 100 MG/DL — HIGH (ref 70–99)
HCT VFR BLD CALC: 39.4 % — SIGNIFICANT CHANGE UP (ref 34.5–45)
HGB BLD-MCNC: 12.5 G/DL — SIGNIFICANT CHANGE UP (ref 11.5–15.5)
IMM GRANULOCYTES NFR BLD AUTO: 0.5 % — SIGNIFICANT CHANGE UP (ref 0–1.5)
INR BLD: 1.14 RATIO — SIGNIFICANT CHANGE UP (ref 0.88–1.16)
LYMPHOCYTES # BLD AUTO: 2.25 K/UL — SIGNIFICANT CHANGE UP (ref 1–3.3)
LYMPHOCYTES # BLD AUTO: 26.5 % — SIGNIFICANT CHANGE UP (ref 13–44)
MAGNESIUM SERPL-MCNC: 2.2 MG/DL — SIGNIFICANT CHANGE UP (ref 1.6–2.6)
MCHC RBC-ENTMCNC: 29.3 PG — SIGNIFICANT CHANGE UP (ref 27–34)
MCHC RBC-ENTMCNC: 31.7 GM/DL — LOW (ref 32–36)
MCV RBC AUTO: 92.5 FL — SIGNIFICANT CHANGE UP (ref 80–100)
MONOCYTES # BLD AUTO: 0.7 K/UL — SIGNIFICANT CHANGE UP (ref 0–0.9)
MONOCYTES NFR BLD AUTO: 8.2 % — SIGNIFICANT CHANGE UP (ref 2–14)
NEUTROPHILS # BLD AUTO: 5.23 K/UL — SIGNIFICANT CHANGE UP (ref 1.8–7.4)
NEUTROPHILS NFR BLD AUTO: 61.6 % — SIGNIFICANT CHANGE UP (ref 43–77)
NRBC # BLD: 0 /100 WBCS — SIGNIFICANT CHANGE UP (ref 0–0)
PHOSPHATE SERPL-MCNC: 4.2 MG/DL — SIGNIFICANT CHANGE UP (ref 2.5–4.5)
PLATELET # BLD AUTO: 327 K/UL — SIGNIFICANT CHANGE UP (ref 150–400)
POTASSIUM SERPL-MCNC: 4.2 MMOL/L — SIGNIFICANT CHANGE UP (ref 3.5–5.3)
POTASSIUM SERPL-SCNC: 4.2 MMOL/L — SIGNIFICANT CHANGE UP (ref 3.5–5.3)
PROT SERPL-MCNC: 6.4 G/DL — SIGNIFICANT CHANGE UP (ref 6–8.3)
PROTHROM AB SERPL-ACNC: 13.5 SEC — SIGNIFICANT CHANGE UP (ref 10.6–13.6)
RBC # BLD: 4.26 M/UL — SIGNIFICANT CHANGE UP (ref 3.8–5.2)
RBC # FLD: 12.9 % — SIGNIFICANT CHANGE UP (ref 10.3–14.5)
SODIUM SERPL-SCNC: 143 MMOL/L — SIGNIFICANT CHANGE UP (ref 135–145)
SPECIMEN SOURCE: SIGNIFICANT CHANGE UP
WBC # BLD: 8.49 K/UL — SIGNIFICANT CHANGE UP (ref 3.8–10.5)
WBC # FLD AUTO: 8.49 K/UL — SIGNIFICANT CHANGE UP (ref 3.8–10.5)

## 2020-11-23 PROCEDURE — 99232 SBSQ HOSP IP/OBS MODERATE 35: CPT

## 2020-11-23 PROCEDURE — 71045 X-RAY EXAM CHEST 1 VIEW: CPT | Mod: 26

## 2020-11-23 PROCEDURE — 93306 TTE W/DOPPLER COMPLETE: CPT | Mod: 26

## 2020-11-23 PROCEDURE — 99233 SBSQ HOSP IP/OBS HIGH 50: CPT | Mod: GC

## 2020-11-23 PROCEDURE — 33208 INSRT HEART PM ATRIAL & VENT: CPT | Mod: KX,78

## 2020-11-23 PROCEDURE — 93010 ELECTROCARDIOGRAM REPORT: CPT

## 2020-11-23 RX ORDER — KETOROLAC TROMETHAMINE 30 MG/ML
15 SYRINGE (ML) INJECTION ONCE
Refills: 0 | Status: DISCONTINUED | OUTPATIENT
Start: 2020-11-23 | End: 2020-11-23

## 2020-11-23 RX ORDER — SODIUM CHLORIDE 9 MG/ML
1000 INJECTION, SOLUTION INTRAVENOUS
Refills: 0 | Status: DISCONTINUED | OUTPATIENT
Start: 2020-11-23 | End: 2020-11-25

## 2020-11-23 RX ORDER — CARVEDILOL PHOSPHATE 80 MG/1
3.12 CAPSULE, EXTENDED RELEASE ORAL EVERY 12 HOURS
Refills: 0 | Status: DISCONTINUED | OUTPATIENT
Start: 2020-11-23 | End: 2020-11-25

## 2020-11-23 RX ORDER — DILTIAZEM HCL 120 MG
300 CAPSULE, EXT RELEASE 24 HR ORAL DAILY
Refills: 0 | Status: DISCONTINUED | OUTPATIENT
Start: 2020-11-23 | End: 2020-11-25

## 2020-11-23 RX ORDER — ACETAMINOPHEN 500 MG
650 TABLET ORAL ONCE
Refills: 0 | Status: COMPLETED | OUTPATIENT
Start: 2020-11-23 | End: 2020-11-23

## 2020-11-23 RX ORDER — SOTALOL HCL 120 MG
40 TABLET ORAL
Refills: 0 | Status: DISCONTINUED | OUTPATIENT
Start: 2020-11-23 | End: 2020-11-25

## 2020-11-23 RX ORDER — ACETAMINOPHEN 500 MG
650 TABLET ORAL EVERY 6 HOURS
Refills: 0 | Status: DISCONTINUED | OUTPATIENT
Start: 2020-11-23 | End: 2020-11-25

## 2020-11-23 RX ADMIN — CEFEPIME 100 MILLIGRAM(S): 1 INJECTION, POWDER, FOR SOLUTION INTRAMUSCULAR; INTRAVENOUS at 12:37

## 2020-11-23 RX ADMIN — Medication 650 MILLIGRAM(S): at 22:11

## 2020-11-23 RX ADMIN — Medication 650 MILLIGRAM(S): at 12:39

## 2020-11-23 RX ADMIN — CHLORHEXIDINE GLUCONATE 1 APPLICATION(S): 213 SOLUTION TOPICAL at 15:25

## 2020-11-23 RX ADMIN — Medication 40 MILLIGRAM(S): at 20:51

## 2020-11-23 RX ADMIN — PITAVASTATIN CALCIUM 2 MILLIGRAM(S): 1.04 TABLET, FILM COATED ORAL at 20:51

## 2020-11-23 RX ADMIN — Medication 300 MILLIGRAM(S): at 17:24

## 2020-11-23 RX ADMIN — Medication 75 MICROGRAM(S): at 06:16

## 2020-11-23 RX ADMIN — SODIUM CHLORIDE 100 MILLILITER(S): 9 INJECTION, SOLUTION INTRAVENOUS at 20:51

## 2020-11-23 RX ADMIN — CARVEDILOL PHOSPHATE 3.12 MILLIGRAM(S): 80 CAPSULE, EXTENDED RELEASE ORAL at 17:24

## 2020-11-23 RX ADMIN — SODIUM CHLORIDE 100 MILLILITER(S): 9 INJECTION, SOLUTION INTRAVENOUS at 17:25

## 2020-11-23 RX ADMIN — Medication 650 MILLIGRAM(S): at 13:25

## 2020-11-23 RX ADMIN — Medication 650 MILLIGRAM(S): at 21:41

## 2020-11-23 RX ADMIN — TEMAZEPAM 30 MILLIGRAM(S): 15 CAPSULE ORAL at 21:41

## 2020-11-23 RX ADMIN — Medication 15 MILLIGRAM(S): at 14:28

## 2020-11-23 RX ADMIN — Medication 40 MILLIGRAM(S): at 06:16

## 2020-11-23 NOTE — PROGRESS NOTE ADULT - ASSESSMENT
75y F PMHx HTN, HLD, hypothyroid, afib (on eliquis), sinus bradycardia s/p PPM p/w pacemaker site infection pending placement of PPM today.

## 2020-11-23 NOTE — CHART NOTE - NSCHARTNOTEFT_GEN_A_CORE
patient s/p PPM implant via left ACW and attempted loop recorder removal with dressing to middle of chest    Called over by RN for c/o "sticking pain" to central of chest and radiating all over chest wall. Pain 8/10.   Stat ECHO ordered at bedside   toradol 40mg IVP x1   MD Moore notified patient s/p PPM implant via left ACW and attempted loop recorder removal with dressing to middle of chest    Called over by RN for c/o "sticking pain" to central of chest and radiating all over chest wall. Pain 8/10.   Stat ECHO ordered at bedside   toradol 40mg IVP x1   MD Moore notified      ADD: patient c/o significant pain relief after toradol IVP.

## 2020-11-23 NOTE — PROGRESS NOTE ADULT - PROBLEM SELECTOR PLAN 1
- PPM site infection, PPM removal/exchange 11/17, PPM reimplantation Monday (11/23/20)  - cultures NGTD  - cefepime (11/16- )  - Vanc discontinued 11/20  - EP and ID following  -Pending pacemaker placement today  -case management involvement for wound vac management services.

## 2020-11-23 NOTE — PROGRESS NOTE ADULT - SUBJECTIVE AND OBJECTIVE BOX
*INCOMPLETE NOTE*      Tommie Javed MD  Internal Medicine  Pager #[phone]    Patient is a 75y old  Female who presents with a chief complaint of pacemaker site infection (22 Nov 2020 14:33)      SUBJECTIVE / OVERNIGHT EVENTS:    Pending PPM today, NPO after midnight and AC off since 4AM  Alternating NSR and aflutter per [employer] tech  no acute issues no cp, palpitations, sob, dizziness, vision changes.   soft bms overnight    ADDITIONAL REVIEW OF SYSTEMS:    CONSTITUTIONAL: No weakness, fevers or chills  EYES/ENT: No visual changes;  No vertigo or throat pain.  NECK: No pain or stiffness.  RESPIRATORY: No cough, wheezing, hemoptysis; No shortness of breath.  CARDIOVASCULAR: No chest pain or palpitations  GASTROINTESTINAL: No abdominal pain. No nausea, vomiting, diarrhea, constipation, or melena.  GENITOURINARY: No dysuria, frequency or hematuria  NEUROLOGICAL: No numbness or weakness  SKIN: No itching, burning, rashes, or lesions   All other review of systems is negative unless indicated above.    MEDICATIONS  (STANDING):  cefepime   IVPB 1000 milliGRAM(s) IV Intermittent every 12 hours  chlorhexidine 2% Cloths 1 Application(s) Topical at bedtime  levothyroxine 50 MICROGram(s) Oral <User Schedule>  levothyroxine 75 MICROGram(s) Oral <User Schedule>  pitavastatin 2 milliGRAM(s) Oral daily  sotalol 40 milliGRAM(s) Oral two times a day    MEDICATIONS  (PRN):  benzocaine 15 mG/menthol 3.6 mG (Sugar-Free) Lozenge 1 Lozenge Oral four times a day PRN Sore Throat  heparin   Injectable 7500 Unit(s) IV Push every 6 hours PRN For aPTT less than 40  heparin   Injectable 3500 Unit(s) IV Push every 6 hours PRN For aPTT between 40 - 57  temazepam 30 milliGRAM(s) Oral at bedtime PRN Insomnia      CAPILLARY BLOOD GLUCOSE        I&O's Summary    22 Nov 2020 07:01  -  23 Nov 2020 07:00  --------------------------------------------------------  IN: 1155 mL / OUT: 0 mL / NET: 1155 mL        PHYSICAL EXAM:  Vital Signs Last 24 Hrs  T(C): 36.6 (23 Nov 2020 05:00), Max: 36.6 (22 Nov 2020 14:03)  T(F): 97.8 (23 Nov 2020 05:00), Max: 97.8 (22 Nov 2020 14:03)  HR: 53 (23 Nov 2020 06:14) (50 - 57)  BP: 157/83 (23 Nov 2020 06:14) (153/73 - 157/88)  BP(mean): --  RR: 17 (23 Nov 2020 05:00) (17 - 17)  SpO2: 97% (23 Nov 2020 05:00) (96% - 98%)    CONSTITUTIONAL: NAD, well-developed  RESPIRATORY: Normal respiratory effort; lungs are clear to auscultation bilaterally  CARDIOVASCULAR: Regular rate, normal S1 and S2, no murmur/rub/gallop; No lower extremity edema; Peripheral pulses are 2+ bilaterally  ABDOMEN: Nontender to palpation, normoactive bowel sounds, no rebound/guarding; No hepatosplenomegaly  MUSCLOSKELETAL: no cyanosis of digits; no joint swelling or tenderness to palpation, full strength all extremities.  NEURO: No focal deficits, CN II-XII grossly intact b/l; sensation to light touch intact in all extremities, gait intact.  PSYCH: A+O to person, place, and time; affect appropriate.    LABS:                        12.5   8.49  )-----------( 327      ( 23 Nov 2020 06:20 )             39.4     11-23    143  |  108  |  32<H>  ----------------------------<  100<H>  4.2   |  24  |  1.90<H>    Ca    9.8      23 Nov 2020 06:20  Phos  4.2     11-23  Mg     2.2     11-23    TPro  6.4  /  Alb  3.8  /  TBili  0.4  /  DBili  x   /  AST  24  /  ALT  24  /  AlkPhos  68  11-23    PTT - ( 22 Nov 2020 18:52 )  PTT:80.3 sec            RADIOLOGY & ADDITIONAL TESTS:  Results Reviewed:   Imaging Personally Reviewed:  Electrocardiogram Personally Reviewed:    COORDINATION OF CARE:  Care Discussed with Consultants/Other Providers [Y/N]:   Prior or Outpatient Records Reviewed [Y/N]:        Tommie Javed MD  Internal Medicine  Pager #[phone]    Patient is a 75y old  Female who presents with a chief complaint of pacemaker site infection (22 Nov 2020 14:33)      SUBJECTIVE / OVERNIGHT EVENTS:    Pending PPM today, NPO after midnight and AC off since 4AM  Alternating NSR and aflutter per [employer] tech  no acute issues no cp, palpitations, sob, dizziness, vision changes.   soft bms overnight    ADDITIONAL REVIEW OF SYSTEMS:    CONSTITUTIONAL: No weakness, fevers or chills  EYES/ENT: No visual changes;  No vertigo or throat pain.  NECK: No pain or stiffness.  RESPIRATORY: No cough, wheezing, hemoptysis; No shortness of breath.  CARDIOVASCULAR: No chest pain or palpitations  GASTROINTESTINAL: No abdominal pain. No nausea, vomiting, diarrhea, constipation, or melena.  GENITOURINARY: No dysuria, frequency or hematuria  NEUROLOGICAL: No numbness or weakness  SKIN: No itching, burning, rashes, or lesions   All other review of systems is negative unless indicated above.    MEDICATIONS  (STANDING):  cefepime   IVPB 1000 milliGRAM(s) IV Intermittent every 12 hours  chlorhexidine 2% Cloths 1 Application(s) Topical at bedtime  levothyroxine 50 MICROGram(s) Oral <User Schedule>  levothyroxine 75 MICROGram(s) Oral <User Schedule>  pitavastatin 2 milliGRAM(s) Oral daily  sotalol 40 milliGRAM(s) Oral two times a day    MEDICATIONS  (PRN):  benzocaine 15 mG/menthol 3.6 mG (Sugar-Free) Lozenge 1 Lozenge Oral four times a day PRN Sore Throat  heparin   Injectable 7500 Unit(s) IV Push every 6 hours PRN For aPTT less than 40  heparin   Injectable 3500 Unit(s) IV Push every 6 hours PRN For aPTT between 40 - 57  temazepam 30 milliGRAM(s) Oral at bedtime PRN Insomnia      CAPILLARY BLOOD GLUCOSE        I&O's Summary    22 Nov 2020 07:01  -  23 Nov 2020 07:00  --------------------------------------------------------  IN: 1155 mL / OUT: 0 mL / NET: 1155 mL        PHYSICAL EXAM:  Vital Signs Last 24 Hrs  T(C): 36.6 (23 Nov 2020 05:00), Max: 36.6 (22 Nov 2020 14:03)  T(F): 97.8 (23 Nov 2020 05:00), Max: 97.8 (22 Nov 2020 14:03)  HR: 53 (23 Nov 2020 06:14) (50 - 57)  BP: 157/83 (23 Nov 2020 06:14) (153/73 - 157/88)  BP(mean): --  RR: 17 (23 Nov 2020 05:00) (17 - 17)  SpO2: 97% (23 Nov 2020 05:00) (96% - 98%)    CONSTITUTIONAL: NAD, well-developed  RESPIRATORY: Normal respiratory effort; lungs are clear to auscultation bilaterally  CARDIOVASCULAR: Regular rate, normal S1 and S2, no murmur/rub/gallop; No lower extremity edema; Peripheral pulses are 2+ bilaterally  ABDOMEN: Nontender to palpation, normoactive bowel sounds, no rebound/guarding; No hepatosplenomegaly  MUSCLOSKELETAL: no cyanosis of digits; no joint swelling or tenderness to palpation, full strength all extremities.  NEURO: No focal deficits, CN II-XII grossly intact b/l; sensation to light touch intact in all extremities, gait intact.  PSYCH: A+O to person, place, and time; affect appropriate.    LABS:                        12.5   8.49  )-----------( 327      ( 23 Nov 2020 06:20 )             39.4     11-23    143  |  108  |  32<H>  ----------------------------<  100<H>  4.2   |  24  |  1.90<H>    Ca    9.8      23 Nov 2020 06:20  Phos  4.2     11-23  Mg     2.2     11-23    TPro  6.4  /  Alb  3.8  /  TBili  0.4  /  DBili  x   /  AST  24  /  ALT  24  /  AlkPhos  68  11-23    PTT - ( 22 Nov 2020 18:52 )  PTT:80.3 sec            RADIOLOGY & ADDITIONAL TESTS:  Results Reviewed:   Imaging Personally Reviewed:  Electrocardiogram Personally Reviewed:    COORDINATION OF CARE:  Care Discussed with Consultants/Other Providers [Y/N]:   Prior or Outpatient Records Reviewed [Y/N]:

## 2020-11-23 NOTE — PROGRESS NOTE ADULT - ASSESSMENT
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 Moore for sinus bradycardia to 29, otherwise asymptomatic  She was advised to get the PPM evaluated and saw a Dr. Kent who noted one of the wires was loose  On 11/5, he elected to put in a new wire and leave the loose wire in  Patient with discharge from site and pain  CXR clear, ppm in place, reviewed personally  Given discharge from site, high concern for pocket infection--appreciate EP procedure, removal of PPM (cultures from removal all negative)  MRSA nares negative  S/p PPM re-placement today, doing well  Overall,  1) Suspected PPM infection  - Discharge from site in setting of recent device manipulation; now s/p explant PPM; BCX NGTD   - Cefepime 1g q 12 (monitor CrCl) for now  - F/U pending cultures/explant culture  - Wound care per EP  2) L 1st toe pain (gout?)  - Further care per primary team  3) HADLEY  - Monitor to normal, avoid Vanco for now    Harvinder Gamez MD  Pager [phone]  After 5pm and on weekends call [phone]

## 2020-11-23 NOTE — PROGRESS NOTE ADULT - SUBJECTIVE AND OBJECTIVE BOX
CC: F/U for Wound infection    Saw/spoke to patient. Patient s/p PPM placement. Doing well, just has soreness at site.    Allergies  latex (Rash)  penicillin (Rash (Mild))    ANTIMICROBIALS:  cefepime   IVPB 1000 every 12 hours    PE:    Vital Signs Last 24 Hrs  T(C): 36.3 (23 Nov 2020 12:00), Max: 37 (23 Nov 2020 09:26)  T(F): 97.4 (23 Nov 2020 12:00), Max: 98.6 (23 Nov 2020 09:26)  HR: 60 (23 Nov 2020 14:10) (50 - 70)  BP: 179/88 (23 Nov 2020 14:10) (139/69 - 201/88)  RR: 17 (23 Nov 2020 14:10) (17 - 18)  SpO2: 98% (23 Nov 2020 14:10) (95% - 100%)    Gen: AOx3, NAD, non-toxic  CV: S1+S2 normal, nontachycardic  Resp: Clear bilat, no resp distress, no crackles/wheezes  Abd: Soft, nontender, +BS  Ext: No LE edema, no wounds    LABS:                        12.5   8.49  )-----------( 327      ( 23 Nov 2020 06:20 )             39.4     11-23    143  |  108  |  32<H>  ----------------------------<  100<H>  4.2   |  24  |  1.90<H>    Ca    9.8      23 Nov 2020 06:20  Phos  4.2     11-23  Mg     2.2     11-23    TPro  6.4  /  Alb  3.8  /  TBili  0.4  /  DBili  x   /  AST  24  /  ALT  24  /  AlkPhos  68  11-23    MICROBIOLOGY:    .Other Other  11-18-20   Testing in progress    .Tissue Other  11-18-20   No growth at 5 days  --    No polymorphonuclear cells seen per low power field  No organisms seen per oil power field    .Blood Blood-Peripheral  11-17-20   No Growth Final    Clostridium difficile GDH Toxins A&amp;B, EIA:   Negative (11-20-20 @ 19:44)    RADIOLOGY:    11/18 CXR:    FINDINGS:    Lines/Tubes: In expected locations    Heart and mediastinum:  Unchanged in appearance.    Lungs, pleura, and airways: Clear lungs    Bones and soft tissues: The bones and soft tissues are unchanged.    Impression:    Clear lungs

## 2020-11-23 NOTE — PROGRESS NOTE ADULT - PROBLEM SELECTOR PLAN 5
elevated Cr (baseline Cr 1) BUN/Cr 15, possible pre-renal vs vanc, zosyn. improving  - UA bland  - urine lytes Cr urine 166 and U Na <35, FeNa .3%, pointing to pre renal cause  - avoid NSAIDs, ACE/ARBs, nephrotoxic drugs, and contrast  - strict I/O  - monitor Cr  -encouraging PO intake.

## 2020-11-23 NOTE — PROGRESS NOTE ADULT - ATTENDING COMMENTS
76 yo female w/pmh HTN, HLD, hypothyroid, afib (on eliquis), SSS s/p PPM p/w pacemaker site infection. S/p gout flare. PPM has been removed, plan for re-implantation today.    PPM site infection  - s/p PPM removal, re-implantation planned for Monday 11/23  - ID following  - cont cefepime, f/u cultures  - stop vanc due to HADLEY and no e/o MRSA  - CM for outpatient wound vanc     Atrial fibrillation, sick sinus syndrome  - monitor on tele  - for PPM re-implantation today, afterwards can restart home rate-control meds  - EP following  - cont heparin drip    HADLEY  - Cr suddenly bumped to 1.99 from 0.95  - has profuse diarrhea, may be pre-renal etiology or intrinsic due to vancomycin  - encouraged drinking more water, will give more IVF    Diarrhea - negative c diff, pending GI pathogen panel; though diarrhea may just be due to antibiotics

## 2020-11-23 NOTE — PROGRESS NOTE ADULT - PROBLEM SELECTOR PLAN 2
s/p DC CV, HR to 180 and BP to 220s, s/p lopressor 5 mg IV and metoprolol tartrate 25 mg  Sotalol 40mg BID per EP. Hold if HR<40. EKG 2 hours after monitor qtc please notify with qtc will need to be held if prolonged. QTC today in the 480s.  -pending PPM replacement Monday 11/23

## 2020-11-24 ENCOUNTER — TRANSCRIPTION ENCOUNTER (OUTPATIENT)
Age: 75
End: 2020-11-24

## 2020-11-24 LAB
ANION GAP SERPL CALC-SCNC: 10 MMOL/L — SIGNIFICANT CHANGE UP (ref 5–17)
BASOPHILS # BLD AUTO: 0.04 K/UL — SIGNIFICANT CHANGE UP (ref 0–0.2)
BASOPHILS NFR BLD AUTO: 0.4 % — SIGNIFICANT CHANGE UP (ref 0–2)
BLD GP AB SCN SERPL QL: NEGATIVE — SIGNIFICANT CHANGE UP
BUN SERPL-MCNC: 32 MG/DL — HIGH (ref 7–23)
CALCIUM SERPL-MCNC: 9.5 MG/DL — SIGNIFICANT CHANGE UP (ref 8.4–10.5)
CHLORIDE SERPL-SCNC: 107 MMOL/L — SIGNIFICANT CHANGE UP (ref 96–108)
CO2 SERPL-SCNC: 23 MMOL/L — SIGNIFICANT CHANGE UP (ref 22–31)
CREAT SERPL-MCNC: 1.77 MG/DL — HIGH (ref 0.5–1.3)
EOSINOPHIL # BLD AUTO: 0.23 K/UL — SIGNIFICANT CHANGE UP (ref 0–0.5)
EOSINOPHIL NFR BLD AUTO: 2.3 % — SIGNIFICANT CHANGE UP (ref 0–6)
GLUCOSE SERPL-MCNC: 108 MG/DL — HIGH (ref 70–99)
HCT VFR BLD CALC: 35.7 % — SIGNIFICANT CHANGE UP (ref 34.5–45)
HGB BLD-MCNC: 11.2 G/DL — LOW (ref 11.5–15.5)
IMM GRANULOCYTES NFR BLD AUTO: 0.7 % — SIGNIFICANT CHANGE UP (ref 0–1.5)
LYMPHOCYTES # BLD AUTO: 1.68 K/UL — SIGNIFICANT CHANGE UP (ref 1–3.3)
LYMPHOCYTES # BLD AUTO: 16.5 % — SIGNIFICANT CHANGE UP (ref 13–44)
MAGNESIUM SERPL-MCNC: 2.1 MG/DL — SIGNIFICANT CHANGE UP (ref 1.6–2.6)
MCHC RBC-ENTMCNC: 29.4 PG — SIGNIFICANT CHANGE UP (ref 27–34)
MCHC RBC-ENTMCNC: 31.4 GM/DL — LOW (ref 32–36)
MCV RBC AUTO: 93.7 FL — SIGNIFICANT CHANGE UP (ref 80–100)
MONOCYTES # BLD AUTO: 0.88 K/UL — SIGNIFICANT CHANGE UP (ref 0–0.9)
MONOCYTES NFR BLD AUTO: 8.6 % — SIGNIFICANT CHANGE UP (ref 2–14)
NEUTROPHILS # BLD AUTO: 7.28 K/UL — SIGNIFICANT CHANGE UP (ref 1.8–7.4)
NEUTROPHILS NFR BLD AUTO: 71.5 % — SIGNIFICANT CHANGE UP (ref 43–77)
NRBC # BLD: 0 /100 WBCS — SIGNIFICANT CHANGE UP (ref 0–0)
PHOSPHATE SERPL-MCNC: 4.3 MG/DL — SIGNIFICANT CHANGE UP (ref 2.5–4.5)
PLATELET # BLD AUTO: 293 K/UL — SIGNIFICANT CHANGE UP (ref 150–400)
POTASSIUM SERPL-MCNC: 4.3 MMOL/L — SIGNIFICANT CHANGE UP (ref 3.5–5.3)
POTASSIUM SERPL-SCNC: 4.3 MMOL/L — SIGNIFICANT CHANGE UP (ref 3.5–5.3)
RBC # BLD: 3.81 M/UL — SIGNIFICANT CHANGE UP (ref 3.8–5.2)
RBC # FLD: 12.8 % — SIGNIFICANT CHANGE UP (ref 10.3–14.5)
RH IG SCN BLD-IMP: POSITIVE — SIGNIFICANT CHANGE UP
SODIUM SERPL-SCNC: 140 MMOL/L — SIGNIFICANT CHANGE UP (ref 135–145)
WBC # BLD: 10.18 K/UL — SIGNIFICANT CHANGE UP (ref 3.8–10.5)
WBC # FLD AUTO: 10.18 K/UL — SIGNIFICANT CHANGE UP (ref 3.8–10.5)

## 2020-11-24 PROCEDURE — 99024 POSTOP FOLLOW-UP VISIT: CPT

## 2020-11-24 PROCEDURE — 71046 X-RAY EXAM CHEST 2 VIEWS: CPT | Mod: 26

## 2020-11-24 PROCEDURE — 93010 ELECTROCARDIOGRAM REPORT: CPT

## 2020-11-24 PROCEDURE — 99232 SBSQ HOSP IP/OBS MODERATE 35: CPT | Mod: GC

## 2020-11-24 PROCEDURE — 99232 SBSQ HOSP IP/OBS MODERATE 35: CPT

## 2020-11-24 PROCEDURE — 93010 ELECTROCARDIOGRAM REPORT: CPT | Mod: 77

## 2020-11-24 RX ORDER — HEPARIN SODIUM 5000 [USP'U]/ML
7500 INJECTION INTRAVENOUS; SUBCUTANEOUS EVERY 6 HOURS
Refills: 0 | Status: DISCONTINUED | OUTPATIENT
Start: 2020-11-24 | End: 2020-11-24

## 2020-11-24 RX ORDER — RIVAROXABAN 15 MG-20MG
1 KIT ORAL
Qty: 0 | Refills: 0 | DISCHARGE

## 2020-11-24 RX ORDER — PITAVASTATIN CALCIUM 1.04 MG/1
2 TABLET, FILM COATED ORAL
Qty: 0 | Refills: 0 | DISCHARGE
Start: 2020-11-24

## 2020-11-24 RX ORDER — HEPARIN SODIUM 5000 [USP'U]/ML
INJECTION INTRAVENOUS; SUBCUTANEOUS
Qty: 25000 | Refills: 0 | Status: DISCONTINUED | OUTPATIENT
Start: 2020-11-24 | End: 2020-11-24

## 2020-11-24 RX ORDER — SOTALOL HCL 120 MG
40 TABLET ORAL ONCE
Refills: 0 | Status: DISCONTINUED | OUTPATIENT
Start: 2020-11-24 | End: 2020-11-24

## 2020-11-24 RX ORDER — SOTALOL HCL 120 MG
0.5 TABLET ORAL
Qty: 30 | Refills: 0
Start: 2020-11-24 | End: 2020-12-23

## 2020-11-24 RX ORDER — APIXABAN 2.5 MG/1
1 TABLET, FILM COATED ORAL
Qty: 60 | Refills: 0
Start: 2020-11-24 | End: 2020-12-23

## 2020-11-24 RX ORDER — PITAVASTATIN CALCIUM 1.04 MG/1
1 TABLET, FILM COATED ORAL
Qty: 0 | Refills: 0 | DISCHARGE
Start: 2020-11-24

## 2020-11-24 RX ORDER — OXYCODONE AND ACETAMINOPHEN 5; 325 MG/1; MG/1
1 TABLET ORAL
Qty: 9 | Refills: 0
Start: 2020-11-24 | End: 2020-11-26

## 2020-11-24 RX ORDER — CEPHALEXIN 500 MG
1 CAPSULE ORAL
Qty: 20 | Refills: 0
Start: 2020-11-24 | End: 2020-11-28

## 2020-11-24 RX ORDER — OXYCODONE AND ACETAMINOPHEN 5; 325 MG/1; MG/1
1 TABLET ORAL ONCE
Refills: 0 | Status: DISCONTINUED | OUTPATIENT
Start: 2020-11-24 | End: 2020-11-24

## 2020-11-24 RX ORDER — SOTALOL HCL 120 MG
80 TABLET ORAL
Qty: 0 | Refills: 0 | DISCHARGE
Start: 2020-11-24

## 2020-11-24 RX ORDER — HEPARIN SODIUM 5000 [USP'U]/ML
7500 INJECTION INTRAVENOUS; SUBCUTANEOUS ONCE
Refills: 0 | Status: DISCONTINUED | OUTPATIENT
Start: 2020-11-24 | End: 2020-11-24

## 2020-11-24 RX ORDER — DIGOXIN 250 MCG
1 TABLET ORAL
Qty: 0 | Refills: 0 | DISCHARGE

## 2020-11-24 RX ORDER — HEPARIN SODIUM 5000 [USP'U]/ML
3500 INJECTION INTRAVENOUS; SUBCUTANEOUS EVERY 6 HOURS
Refills: 0 | Status: DISCONTINUED | OUTPATIENT
Start: 2020-11-24 | End: 2020-11-24

## 2020-11-24 RX ORDER — VALSARTAN 80 MG/1
1 TABLET ORAL
Qty: 0 | Refills: 0 | DISCHARGE

## 2020-11-24 RX ORDER — SOTALOL HCL 120 MG
80 TABLET ORAL
Qty: 2240 | Refills: 0
Start: 2020-11-24 | End: 2020-12-07

## 2020-11-24 RX ORDER — FUROSEMIDE 40 MG
1.5 TABLET ORAL
Qty: 0 | Refills: 0 | DISCHARGE

## 2020-11-24 RX ORDER — APIXABAN 2.5 MG/1
1 TABLET, FILM COATED ORAL
Qty: 60 | Refills: 0
Start: 2020-11-24

## 2020-11-24 RX ADMIN — OXYCODONE AND ACETAMINOPHEN 1 TABLET(S): 5; 325 TABLET ORAL at 18:18

## 2020-11-24 RX ADMIN — CARVEDILOL PHOSPHATE 3.12 MILLIGRAM(S): 80 CAPSULE, EXTENDED RELEASE ORAL at 17:14

## 2020-11-24 RX ADMIN — Medication 650 MILLIGRAM(S): at 08:15

## 2020-11-24 RX ADMIN — CARVEDILOL PHOSPHATE 3.12 MILLIGRAM(S): 80 CAPSULE, EXTENDED RELEASE ORAL at 05:25

## 2020-11-24 RX ADMIN — CHLORHEXIDINE GLUCONATE 1 APPLICATION(S): 213 SOLUTION TOPICAL at 08:13

## 2020-11-24 RX ADMIN — OXYCODONE AND ACETAMINOPHEN 1 TABLET(S): 5; 325 TABLET ORAL at 13:18

## 2020-11-24 RX ADMIN — SODIUM CHLORIDE 100 MILLILITER(S): 9 INJECTION, SOLUTION INTRAVENOUS at 08:10

## 2020-11-24 RX ADMIN — PITAVASTATIN CALCIUM 2 MILLIGRAM(S): 1.04 TABLET, FILM COATED ORAL at 21:24

## 2020-11-24 RX ADMIN — SODIUM CHLORIDE 100 MILLILITER(S): 9 INJECTION, SOLUTION INTRAVENOUS at 05:24

## 2020-11-24 RX ADMIN — Medication 650 MILLIGRAM(S): at 13:18

## 2020-11-24 RX ADMIN — TEMAZEPAM 30 MILLIGRAM(S): 15 CAPSULE ORAL at 23:05

## 2020-11-24 RX ADMIN — CEFEPIME 100 MILLIGRAM(S): 1 INJECTION, POWDER, FOR SOLUTION INTRAMUSCULAR; INTRAVENOUS at 23:06

## 2020-11-24 RX ADMIN — CEFEPIME 100 MILLIGRAM(S): 1 INJECTION, POWDER, FOR SOLUTION INTRAMUSCULAR; INTRAVENOUS at 12:26

## 2020-11-24 RX ADMIN — Medication 50 MICROGRAM(S): at 05:24

## 2020-11-24 RX ADMIN — Medication 40 MILLIGRAM(S): at 08:15

## 2020-11-24 RX ADMIN — CEFEPIME 100 MILLIGRAM(S): 1 INJECTION, POWDER, FOR SOLUTION INTRAMUSCULAR; INTRAVENOUS at 00:20

## 2020-11-24 RX ADMIN — OXYCODONE AND ACETAMINOPHEN 1 TABLET(S): 5; 325 TABLET ORAL at 12:25

## 2020-11-24 RX ADMIN — Medication 300 MILLIGRAM(S): at 05:25

## 2020-11-24 RX ADMIN — Medication 40 MILLIGRAM(S): at 21:24

## 2020-11-24 RX ADMIN — OXYCODONE AND ACETAMINOPHEN 1 TABLET(S): 5; 325 TABLET ORAL at 19:36

## 2020-11-24 RX ADMIN — Medication 650 MILLIGRAM(S): at 03:01

## 2020-11-24 NOTE — DISCHARGE NOTE PROVIDER - NSDCFUSCHEDAPPT_GEN_ALL_CORE_FT
CIRILO ROLDAN ; 11/27/2020 ; [facility] CIRILO Parisi ; 12/03/2020 ; [facility]  CIRILO ROLDAN ; 12/03/2020 ; [facility]

## 2020-11-24 NOTE — DISCHARGE NOTE PROVIDER - PROVIDER TOKENS
PROVIDER:[TOKEN:[[id number]]] PROVIDER:[TOKEN:[[id number]],FOLLOWUP:[1 week]],PROVIDER:[TOKEN:[[id number]],FOLLOWUP:[2 weeks],ESTABLISHEDPATIENT:[T]]

## 2020-11-24 NOTE — PROGRESS NOTE ADULT - SUBJECTIVE AND OBJECTIVE BOX
CC: F/U for PPM infection    Saw/spoke to patient. No fevers, no chills. No new complaints.    Allergies  latex (Rash)  penicillin (Rash (Mild))    ANTIMICROBIALS:  cefepime   IVPB 1000 every 12 hours    PE:    Vital Signs Last 24 Hrs  T(C): 36.7 (24 Nov 2020 14:19), Max: 36.7 (24 Nov 2020 04:28)  T(F): 98 (24 Nov 2020 14:19), Max: 98.1 (24 Nov 2020 04:28)  HR: 61 (24 Nov 2020 14:19) (60 - 61)  BP: 150/87 (24 Nov 2020 14:19) (150/87 - 170/91)  RR: 17 (24 Nov 2020 14:19) (16 - 17)  SpO2: 97% (24 Nov 2020 14:19) (94% - 97%)    Gen: AOx3, NAD, non-toxic  CV: S1+S2 normal, nontachycardic; wound vac to prior PPM site, new PPM site appears clean, no purulence  Resp: Clear bilat, no resp distress, no crackles/wheezes  Abd: Soft, nontender, +BS  Ext: No LE edema, no wounds    LABS:                        11.2   10.18 )-----------( 293      ( 24 Nov 2020 05:36 )             35.7     11-24    140  |  107  |  32<H>  ----------------------------<  108<H>  4.3   |  23  |  1.77<H>    Ca    9.5      24 Nov 2020 05:36  Phos  4.3     11-24  Mg     2.1     11-24    TPro  6.4  /  Alb  3.8  /  TBili  0.4  /  DBili  x   /  AST  24  /  ALT  24  /  AlkPhos  68  11-23    MICROBIOLOGY:    .Other Other  11-18-20   Testing in progress    .Tissue Other  11-18-20   No growth at 5 days  --    No polymorphonuclear cells seen per low power field  No organisms seen per oil power field    .Blood Blood-Peripheral  11-17-20   No Growth Final    Clostridium difficile GDH Toxins A&amp;B, EIA:   Negative (11-20-20 @ 19:44)    RADIOLOGY:    11/24 XR:    FINDINGS:  Left-sided chest wall pacemaker. Left atrial appendage clip.  The lungs are clear.  No pleural effusion. No pneumothorax.  Cardiac size is enlarged.  No acute osseous abnormality.    IMPRESSION: No pneumothorax.

## 2020-11-24 NOTE — DISCHARGE NOTE PROVIDER - HOSPITAL COURSE
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 Patient given three days clindamycin and then 5/20 days levofloxacin given before being sent to [facility] by Dr. Moore for PPM removal and further evaluation. Pacemaker removed on 11/17 and patient cared for in the CCU.  She required DCCV and metoprolol to manage resultant afib with rvr. Patient was placed on sotalol and transferred to the floor with episodes of asymptomatic bradycardia to the 30s over the 11/21-11/22 weekend. On 11/23, a new pacemaker was placed and the procedure was complicated by hypertensive urgency managed with diltiazem 300mg daily and coreg 3.125 BID. She was monitored on telemetry with episodes of abnormal pacemaker (HR 60s-80s) and afib/aflutter to 100s-120s but was otherwise asymptomatic. Her underlying infectious process was managed with vancomycin and zosyn and she was followed by ID. Her vancomycin was discontinued on 11/20 due to an increase in her creatinine. Diarrheal symptoms were present during her admission likely secondary to long term antibiotics however C.diff toxin was negative. Her clinical course was also briefly complicated by an episode of gout which resolved with colchicine. At the time of her discharge, patient was hemodynamically stable and clinically well. She was instructed to follow up closely with Dr. Moore for her pacemaker and her PCP for her chronic conditions.   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 Patient given three days clindamycin and then 5/20 days levofloxacin given before being sent to [facility] by Dr. Moore for PPM removal and further evaluation. Pacemaker removed on 11/17 and patient cared for in the CCU.  She required DCCV and metoprolol to manage resultant afib with rvr. Patient was placed on sotalol and transferred to the floor with episodes of asymptomatic bradycardia to the 30s over the 11/21-11/22 weekend. On 11/23, a new pacemaker was placed and the procedure was complicated by hypertensive urgency managed with diltiazem 300mg daily and coreg 3.125 BID. She was monitored on telemetry with episodes of abnormal pacemaker (HR 60s-80s) and afib/aflutter to 100s-120s but was otherwise asymptomatic. Her underlying infectious process was managed with vancomycin and cefepime (11/16-11/24) and she was followed by ID. Her vancomycin was given 11/16-11/20 and discontinued due to an increase in her creatinine. At no time did blood cultures grow out a causative organism. Diarrheal symptoms were present during her admission likely secondary to long term antibiotics however C.diff toxin was negative. Her clinical course was also briefly complicated by an episode of gout which resolved with colchicine. At the time of her discharge, patient was hemodynamically stable and clinically well. She was instructed to follow up closely with Dr. Moore for her pacemaker and her PCP for her chronic conditions.   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 Patient given three days clindamycin and then 5/20 days levofloxacin given before being sent to [facility] by Dr. Moore for PPM removal and further evaluation. Pacemaker removed on 11/17 and patient cared for in the CCU.  She required DCCV and metoprolol to manage resultant afib with rvr. Patient was placed on sotalol and transferred to the floor with episodes of asymptomatic bradycardia to the 30s over the 11/21-11/22 weekend. On 11/23, a new pacemaker was placed and the procedure was complicated by hypertensive urgency managed with diltiazem 300mg daily and coreg 3.125 BID. She was monitored on telemetry with episodes of abnormal pacemaker (HR 60s-80s) and afib/aflutter to 100s-120s but was otherwise asymptomatic. Her underlying infectious process was managed with vancomycin and cefepime (11/16-11/24) and she was followed by ID. Her vancomycin was given 11/16-11/20 and discontinued due to an increase in her creatinine. At no time did blood cultures grow out a causative organism. Diarrheal symptoms were present during her admission likely secondary to long term antibiotics however C.diff toxin was negative. Her clinical course was also briefly complicated by an episode of gout which resolved with colchicine. At the time of her discharge, patient was hemodynamically stable and clinically well. She was instructed to follow up closely with Dr. Moore for her pacemaker and her PCP for her chronic conditions.

## 2020-11-24 NOTE — PHARMACOTHERAPY INTERVENTION NOTE - COMMENTS
Counseled patient on discharge medication doses, indications, and possible side effects. Confirmed coverage for new medication Eliquis with [facility] - covered with $20 copay, one month free trial  coupon applied. Patient exhibited understanding of how to properly take her medications upon discharge.    Shahla Law, PharmD, BCPS  [phone]

## 2020-11-24 NOTE — PROGRESS NOTE ADULT - ATTENDING COMMENTS
74 yo female w/pmh HTN, HLD, hypothyroid, afib (on eliquis), SSS s/p PPM p/w pacemaker site infection. S/p gout flare. PPM has been removed 11/17, re-implanted at new site on 11/23.     PPM site infection  - s/p PPM removal, re-implantation done 11/23  - ID following  - s/p 7 day course of abx, will DC on keflex for additional 5 days  - CM for outpatient wound vac; patient also needs teaching from PT  - Follow up in device clinic for wound check as scheduled on 12/3/2020 at 10:40am    Atrial fibrillation, sick sinus syndrome  - monitor on tele  - EP following  - restart DOAC - eliquis upon discharge if insurance covers it (otherwise Xarelto)  - send home on sotalol 40mg bid, coreg 3.25 q12, diltiazem 300mg qd, discontinued digoxin    HADLEY  - Cr suddenly bumped to 1.99 from 0.95, today trending down  - had profuse diarrhea, may be pre-renal etiology or intrinsic due to vancomycin  - will have patient f/u with PCP to check BMP in 1 week    Diarrhea - negative c diff, resolved    Medically stable for discharge home.    35 minutes were spent coordinating discharge.

## 2020-11-24 NOTE — DISCHARGE NOTE PROVIDER - NSDCFUADDAPPT_GEN_ALL_CORE_FT
Please followup with Dr. Moore at your scheduled appt time  Please followup with your PCP in 1-2 weeks from discharge

## 2020-11-24 NOTE — PROVIDER CONTACT NOTE (OTHER) - ACTION/TREATMENT ORDERED:
Changed the rate based on full coagulation nomogram
D/C LR
EP will provider further recommendations for management
Hold sotalol and continue to monitor.
Will relay to day team; no further interventions at this time; will continue to monitor.
will continue to monitor
Continue current med management and cardiac monitoring. Safety maintained. Will continue to monitor.
MD aware; will continue to monitor

## 2020-11-24 NOTE — PROGRESS NOTE ADULT - ASSESSMENT
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 Moore for sinus bradycardia to 29, otherwise asymptomatic  She was advised to get the PPM evaluated and saw a Dr. Kent who noted one of the wires was loose  On 11/5, he elected to put in a new wire and leave the loose wire in  Patient with discharge from site and pain  CXR clear, ppm in place, reviewed personally  Given discharge from site, high concern for pocket infection--appreciate EP procedure, removal of PPM (cultures from removal all negative)  MRSA nares negative  S/p PPM re-placement, doing well  Overall,  1) Suspected PPM infection  - Discharge from site in setting of recent device manipulation; now s/p explant PPM 11/18; BCX NGTD   - Cefepime 1g q 12 (monitor CrCl) while inpatient, when DC planning, would send on Keflex 500mg q 6 for 5 more days  - F/U pending cultures  - Wound care per EP  2) L 1st toe pain (gout?)  - Further care per primary team  3) HADLEY  - Monitor to normal, avoid Vanco for now    Harvinder Gamez MD  Pager [phone]  After 5pm and on weekends call [phone]

## 2020-11-24 NOTE — PROGRESS NOTE ADULT - PROBLEM SELECTOR PLAN 1
- PPM site infection, PPM removal 11/17, PPM reimplantation Monday (11/23/20)  - cultures NGTD  - cefepime (11/16- )  - Vanc discontinued 11/20  - EP and ID following  -pacemaker placed 11/23  -case management involvement for wound vac management services.  -Will touch base with ID regarding antibiotics recommendations.

## 2020-11-24 NOTE — PROGRESS NOTE ADULT - PROBLEM SELECTOR PLAN 10
DVT ppx: Hold home AC until Thursday 11/26 per EP reccomendations  Diet: DASH TLC  Dispo: PT consult appreciated.

## 2020-11-24 NOTE — DISCHARGE NOTE PROVIDER - NSDCCPCAREPLAN_GEN_ALL_CORE_FT
PRINCIPAL DISCHARGE DIAGNOSIS  Diagnosis: Pacemaker complications, initial encounter  Assessment and Plan of Treatment: You had a pacemaker site infection that was treated with the antibiotics vancomycin and cefepime. No organism was confirmed from blood cultures. The old pacemaker was removed and a new pacemaker was placed by Dr. Moore. Please follow up with Dr. Moore for further management of your pacemaker.      SECONDARY DISCHARGE DIAGNOSES  Diagnosis: Diarrhea  Assessment and Plan of Treatment: You had diarrhea during your admission because of long term antibiotics. You did not have clostridium dificil infeciton. We encouraged you to drink plenty of fluids and to eat in order to keep up with volume losses. Please continue good intake of fluids and food at home and advise a doctor if you continue to have issues with diarrhea.    Diagnosis: HADLEY (acute kidney injury)  Assessment and Plan of Treatment: You had an acute kidney injury during your admission which was likely a result of vancomycin antibiotic or lost fluids from diarrhea. We encouraged you to drink plenty of fluids and we discontinued the vancomycin antibiotic. You produced strong urine output at the end of your admission and your kidney numbers improved. Please follow up with your PCP regarding this issue.     PRINCIPAL DISCHARGE DIAGNOSIS  Diagnosis: Pacemaker complications, initial encounter  Assessment and Plan of Treatment: You had a pacemaker site infection that was treated with the antibiotics vancomycin and cefepime. No organism was confirmed from blood cultures. The old pacemaker was removed and a new pacemaker was placed by Dr. Moore. Please follow up with Dr. Moore for further management of your pacemaker. Please continued taking 80mg twice a day of sotalol (betapace) unless instructed otherwise by Dr. Moore.      SECONDARY DISCHARGE DIAGNOSES  Diagnosis: Hypertension  Assessment and Plan of Treatment: You have a history of high blood pressure which we are managing her in the hospital with diltizaem 300mg once a day and coreg 3.125mg twice a day. Please take these medications on discharge and continue to take unless instructed otherwise by Dr. Moore or your PCP.    Diagnosis: Diarrhea  Assessment and Plan of Treatment: You had diarrhea during your admission because of long term antibiotics. You did not have clostridium dificil infeciton. We encouraged you to drink plenty of fluids and to eat in order to keep up with volume losses. Please continue good intake of fluids and food at home and advise a doctor if you continue to have issues with diarrhea.    Diagnosis: HADLEY (acute kidney injury)  Assessment and Plan of Treatment: You had an acute kidney injury during your admission which was likely a result of vancomycin antibiotic or lost fluids from diarrhea. We encouraged you to drink plenty of fluids and we discontinued the vancomycin antibiotic. You produced strong urine output at the end of your admission and your kidney numbers improved. Please follow up with your PCP regarding this issue.     PRINCIPAL DISCHARGE DIAGNOSIS  Diagnosis: Pacemaker complications, initial encounter  Assessment and Plan of Treatment: You had a pacemaker site infection that was treated with the antibiotics vancomycin and cefepime. No organism was confirmed from blood cultures. The old pacemaker was removed and a new pacemaker was placed by Dr. Moore. Please follow up with Dr. Moore for further management of your pacemaker. Please continued taking 80mg twice a day of sotalol (betapace) unless instructed otherwise by Dr. Moore. You can bathe with the wound vac site uncovered just please remove the wound vac before stepping into the shower. It may be best to bathe 30 minutes before the nurse is coming daily. Start bathing tomorrow.      SECONDARY DISCHARGE DIAGNOSES  Diagnosis: Hypertension  Assessment and Plan of Treatment: You have a history of high blood pressure which we are managing her in the hospital with diltizaem 300mg once a day and coreg 3.125mg twice a day. Please take these medications on discharge and continue to take unless instructed otherwise by Dr. Moore or your PCP.    Diagnosis: Diarrhea  Assessment and Plan of Treatment: You had diarrhea during your admission because of long term antibiotics. You did not have clostridium dificil infeciton. We encouraged you to drink plenty of fluids and to eat in order to keep up with volume losses. Please continue good intake of fluids and food at home and advise a doctor if you continue to have issues with diarrhea.    Diagnosis: HADLEY (acute kidney injury)  Assessment and Plan of Treatment: You had an acute kidney injury during your admission which was likely a result of vancomycin antibiotic or lost fluids from diarrhea. We encouraged you to drink plenty of fluids and we discontinued the vancomycin antibiotic. You produced strong urine output at the end of your admission and your kidney numbers improved. Please follow up with your PCP regarding this issue.     PRINCIPAL DISCHARGE DIAGNOSIS  Diagnosis: Pacemaker complications, initial encounter  Assessment and Plan of Treatment: You had a pacemaker site infection that was treated with the antibiotics vancomycin and cefepime. No organism was confirmed from blood cultures. The old pacemaker was removed and a new pacemaker was placed by Dr. Moore. Please follow up with Dr. Moore for further management of your pacemaker. Please continued taking 40mg twice a day of sotalol (betapace) unless instructed otherwise by Dr. Moore. You can bathe with the wound vac site uncovered just please remove the wound vac before stepping into the shower. It may be best to bathe 30 minutes before the nurse is coming daily. Start bathing tomorrow. If you have any recurrent symptoms of infection such as fevers chills, please call your doctor or go to the ED.      SECONDARY DISCHARGE DIAGNOSES  Diagnosis: Hypertension  Assessment and Plan of Treatment: You have a history of high blood pressure which we are managing her in the hospital with diltizaem 300mg once a day and coreg 3.125mg twice a day. Please take these medications on discharge and continue to take unless instructed otherwise by Dr. Moore or your PCP.    Diagnosis: HADLEY (acute kidney injury)  Assessment and Plan of Treatment: You had an acute kidney injury during your admission which was likely a result of vancomycin antibiotic or lost fluids from diarrhea. We encouraged you to drink plenty of fluids and we discontinued the vancomycin antibiotic. You produced strong urine output at the end of your admission and your kidney numbers improved. Please follow up with your PCP regarding this issue.    Diagnosis: Diarrhea  Assessment and Plan of Treatment: You had diarrhea during your admission because of long term antibiotics. You did not have clostridium difficile infection. We encouraged you to drink plenty of fluids and to eat in order to keep up with volume losses. Please continue good intake of fluids and food at home and advise a doctor if you continue to have issues with diarrhea.

## 2020-11-24 NOTE — PROGRESS NOTE ADULT - PROBLEM SELECTOR PLAN 7
- home BP meds coreg 3.125 mg BID, losartan 100 mg qd, lasix 30 mg qd  - Coreg and diltiazem as above inpt

## 2020-11-24 NOTE — DISCHARGE NOTE PROVIDER - CARE PROVIDER_API CALL
Ioana Moore  Cardiovascular Diseases  [address] [place] [address]  Phone: [phone]  Fax: [phone]  Follow Up Time:    Ioana Moore  Cardiovascular Diseases  [address] [place] [address]  Phone: [phone]  Fax: [phone]  Follow Up Time: 1 week    KAY GAY  [id number]  [address] [place] [address]  Phone: [phone]  Fax: [phone]  Established Patient  Follow Up Time: 2 weeks

## 2020-11-24 NOTE — PROGRESS NOTE ADULT - ASSESSMENT
75 year old female h/o HTN, HLD, hypothyroid, PAF (on Xarelto), SSS, s/p ILR, s/p right sided PPM with capped Biotronik RA/RV leads from 2009, new RA lead 2014, generator change (STJ) 2018 and RV lead revision 11/5/20 p/w pacemaker pocket infection s/p PPM system extraction with rapid Afib and Aflutter s/p DCCV in the OR on 11/17. Patient underwent left sided PPM ([employer]) implant 11/23/20.  1. PPM pocket infection s/p extraction 11/17  2. SSS  3. PAF/PAFL with RVR  4. HADLEY in setting of diarrhea & Vanco--Cr stable & slightly downtrending    --Post procedure instructions discussed with patient  --Post-op PPM interrogated by [employer] rep this am; revealed normal device function, pacing mode at DDDR at  bpm  --May need percocet PRN for pain if tylenol does not work  --Given HADLEY w/ CrCl of 29, will keep patient on sotalol 40mg BID  --Continue rate control agents with diltiazem CD 300mg QD and coreg 3.125mg BID, this will help with BP management as well  --On Thursday (11/26/20) the plan is to start eliquis 5mg BID if insurance covers, otherwise will restart xarelto 15mg QD given HADLEY.   --Pt to follow up with her PCP early next week for repeat BMP.   --Follow up in device clinic for wound check as scheduled on 12/3/2020 at 10:40am  --Discharge planning today with homecare service for wound vac management.  --Discussed with Dr. Moore and primary team     ELVA Morales, SITA  [id number]

## 2020-11-24 NOTE — DISCHARGE NOTE PROVIDER - NSDCMRMEDTOKEN_GEN_ALL_CORE_FT
carvedilol 3.125 mg oral tablet: 1 tab(s) orally 2 times a day  digoxin 250 mcg (0.25 mg) oral tablet: 1 tab(s) orally 5 times a week  dilTIAZem 300 mg/24 hours oral capsule, extended release: 1 cap(s) orally once a day  Lasix 20 mg oral tablet: 1.5 tab(s) orally once a day  Synthroid 50 mcg (0.05 mg) oral tablet: orally every other day  Synthroid 75 mcg (0.075 mg) oral tablet: 1 tab(s) orally every other day  temazepam 30 mg oral capsule: 1 cap(s) orally once a day (at bedtime)  valsartan 320 mg oral tablet: 1 tab(s) orally once a day  Xarelto 10 mg oral tablet: 1 tab(s) orally once a day   carvedilol 3.125 mg oral tablet: 1 tab(s) orally 2 times a day  digoxin 250 mcg (0.25 mg) oral tablet: 1 tab(s) orally 5 times a week  dilTIAZem 300 mg/24 hours oral capsule, extended release: 1 cap(s) orally once a day  Lasix 20 mg oral tablet: 1.5 tab(s) orally once a day  pitavastatin (as calcium) 2 mg oral tablet: 1 tab(s) orally once a day  Synthroid 50 mcg (0.05 mg) oral tablet: orally every other day  Synthroid 75 mcg (0.075 mg) oral tablet: 1 tab(s) orally every other day  temazepam 30 mg oral capsule: 1 cap(s) orally once a day (at bedtime)  valsartan 320 mg oral tablet: 1 tab(s) orally once a day  Xarelto 10 mg oral tablet: 1 tab(s) orally once a day   carvedilol 3.125 mg oral tablet: 1 tab(s) orally 2 times a day  dilTIAZem 300 mg/24 hours oral capsule, extended release: 1 cap(s) orally once a day  pitavastatin (as calcium) 2 mg oral tablet: 1 tab(s) orally once a day  sotalol: 80 milligram(s) orally 2 times a day  Synthroid 50 mcg (0.05 mg) oral tablet: orally every other day  Synthroid 75 mcg (0.075 mg) oral tablet: 1 tab(s) orally every other day  temazepam 30 mg oral capsule: 1 cap(s) orally once a day (at bedtime)   apixaban 5 mg oral tablet: 1 tab(s) orally 2 times a day   Betapace AF 80 mg oral tablet: 0.5 tab(s) orally 2 times a day   carvedilol 3.125 mg oral tablet: 1 tab(s) orally 2 times a day  dilTIAZem 300 mg/24 hours oral capsule, extended release: 1 cap(s) orally once a day  Percocet 5 mg-325 mg oral tablet: 1 tab(s) orally every 8 hours MDD:Three 5-325mg tabs in a 24 hours period.  pitavastatin (as calcium) 2 mg oral tablet: 1 tab(s) orally once a day  sotalol: 80 milligram(s) orally 2 times a day  Synthroid 50 mcg (0.05 mg) oral tablet: orally every other day  Synthroid 75 mcg (0.075 mg) oral tablet: 1 tab(s) orally every other day  temazepam 30 mg oral capsule: 1 cap(s) orally once a day (at bedtime)   apixaban 5 mg oral tablet: 1 tab(s) orally 2 times a day   Betapace AF 80 mg oral tablet: 0.5 tab(s) orally 2 times a day   carvedilol 3.125 mg oral tablet: 1 tab(s) orally 2 times a day  cephalexin 500 mg oral capsule: 1 cap(s) orally every 6 hours   dilTIAZem 300 mg/24 hours oral capsule, extended release: 1 cap(s) orally once a day  Percocet 5 mg-325 mg oral tablet: 1 tab(s) orally every 8 hours MDD:Three 5-325mg tabs in a 24 hours period.  pitavastatin (as calcium) 2 mg oral tablet: 1 tab(s) orally once a day  Synthroid 50 mcg (0.05 mg) oral tablet: orally every other day  Synthroid 75 mcg (0.075 mg) oral tablet: 1 tab(s) orally every other day  temazepam 30 mg oral capsule: 1 cap(s) orally once a day (at bedtime)

## 2020-11-24 NOTE — PROGRESS NOTE ADULT - SUBJECTIVE AND OBJECTIVE BOX
Tommie Javed MD  Internal Medicine  Pager #[phone]    Patient is a 75y old  Female who presents with a chief complaint of pacemaker site infection (23 Nov 2020 07:21)      SUBJECTIVE / OVERNIGHT EVENTS:    Pacemaker placed yesterday. On diltiazem, coreg, and sotalol. Afib/flutter 100s-120s overnight and altered pacemaker 60s-80s HR.     Chest pain at site of attempted wire removal.     Would like to go home and have wound vac management arranged.     ADDITIONAL REVIEW OF SYSTEMS:    CONSTITUTIONAL: No weakness, fevers or chills  EYES/ENT: No visual changes;  No vertigo or throat pain.  NECK: No pain or stiffness.  RESPIRATORY: No cough, wheezing, hemoptysis; No shortness of breath.  CARDIOVASCULAR: No chest pain or palpitations  GASTROINTESTINAL: No abdominal pain. No nausea, vomiting, diarrhea, constipation, or melena.  GENITOURINARY: No dysuria, frequency or hematuria  NEUROLOGICAL: No numbness or weakness  SKIN: No itching, burning, rashes, or lesions   All other review of systems is negative unless indicated above.    MEDICATIONS  (STANDING):  carvedilol 3.125 milliGRAM(s) Oral every 12 hours  cefepime   IVPB 1000 milliGRAM(s) IV Intermittent every 12 hours  chlorhexidine 2% Cloths 1 Application(s) Topical at bedtime  diltiazem    milliGRAM(s) Oral daily  lactated ringers. 1000 milliLiter(s) (100 mL/Hr) IV Continuous <Continuous>  levothyroxine 50 MICROGram(s) Oral <User Schedule>  levothyroxine 75 MICROGram(s) Oral <User Schedule>  pitavastatin 2 milliGRAM(s) Oral daily  sotalol 40 milliGRAM(s) Oral two times a day    MEDICATIONS  (PRN):  acetaminophen   Tablet .. 650 milliGRAM(s) Oral every 6 hours PRN Mild Pain (1 - 3), Moderate Pain (4 - 6)  benzocaine 15 mG/menthol 3.6 mG (Sugar-Free) Lozenge 1 Lozenge Oral four times a day PRN Sore Throat  temazepam 30 milliGRAM(s) Oral at bedtime PRN Insomnia      CAPILLARY BLOOD GLUCOSE        I&O's Summary    23 Nov 2020 07:01  -  24 Nov 2020 07:00  --------------------------------------------------------  IN: 1605 mL / OUT: 350 mL / NET: 1255 mL        PHYSICAL EXAM:  Vital Signs Last 24 Hrs  T(C): 36.7 (24 Nov 2020 04:28), Max: 37 (23 Nov 2020 09:26)  T(F): 98.1 (24 Nov 2020 04:28), Max: 98.6 (23 Nov 2020 09:26)  HR: 60 (24 Nov 2020 04:28) (53 - 70)  BP: 163/84 (24 Nov 2020 04:28) (139/69 - 201/88)  BP(mean): --  RR: 16 (24 Nov 2020 04:28) (16 - 18)  SpO2: 94% (24 Nov 2020 04:28) (94% - 100%)    CONSTITUTIONAL: NAD, well-developed  RESPIRATORY: Normal respiratory effort; lungs are clear to auscultation bilaterally  CARDIOVASCULAR: Bandages in place on pacemaker removal and new implantation site. Regular rate, normal S1 and S2, no murmur/rub/gallop; No lower extremity edema; Peripheral pulses are 2+ bilaterally  ABDOMEN: Nontender to palpation, normoactive bowel sounds, no rebound/guarding; No hepatosplenomegaly  MUSCLOSKELETAL: no cyanosis of digits; no joint swelling or tenderness to palpation, full strength all extremities.  NEURO: No focal deficits, CN II-XII grossly intact b/l; sensation to light touch intact in all extremities, gait intact.  PSYCH: A+O to person, place, and time; affect appropriate.    LABS:                        11.2   10.18 )-----------( 293      ( 24 Nov 2020 05:36 )             35.7     11-24    140  |  107  |  32<H>  ----------------------------<  108<H>  4.3   |  23  |  1.77<H>    Ca    9.5      24 Nov 2020 05:36  Phos  4.3     11-24  Mg     2.1     11-24    TPro  6.4  /  Alb  3.8  /  TBili  0.4  /  DBili  x   /  AST  24  /  ALT  24  /  AlkPhos  68  11-23    PT/INR - ( 23 Nov 2020 08:34 )   PT: 13.5 sec;   INR: 1.14 ratio         PTT - ( 23 Nov 2020 08:34 )  PTT:34.6 sec            RADIOLOGY & ADDITIONAL TESTS:  Results Reviewed:   Imaging Personally Reviewed:  Electrocardiogram Personally Reviewed:    COORDINATION OF CARE:  Care Discussed with Consultants/Other Providers [Y/N]:   Prior or Outpatient Records Reviewed [Y/N]:

## 2020-11-24 NOTE — PROGRESS NOTE ADULT - SUBJECTIVE AND OBJECTIVE BOX
24H hour events: pt c/o pain at left chest wall PPM site, just received tylenol. s/p LCW PPM implant 11/23.    MEDICATIONS:  carvedilol 3.125 milliGRAM(s) Oral every 12 hours  diltiazem    milliGRAM(s) Oral daily  sotalol 40 milliGRAM(s) Oral two times a day  cefepime   IVPB 1000 milliGRAM(s) IV Intermittent every 12 hours  acetaminophen   Tablet .. 650 milliGRAM(s) Oral every 6 hours PRN  temazepam 30 milliGRAM(s) Oral at bedtime PRN  levothyroxine 50 MICROGram(s) Oral <User Schedule>  levothyroxine 75 MICROGram(s) Oral <User Schedule>  pitavastatin 2 milliGRAM(s) Oral daily    benzocaine 15 mG/menthol 3.6 mG (Sugar-Free) Lozenge 1 Lozenge Oral four times a day PRN  chlorhexidine 2% Cloths 1 Application(s) Topical at bedtime  lactated ringers. 1000 milliLiter(s) IV Continuous <Continuous>      REVIEW OF SYSTEMS:  Complete 10point ROS negative.    PHYSICAL EXAM:  T(C): 36.7 (11-24-20 @ 04:28), Max: 36.7 (11-24-20 @ 04:28)  HR: 60 (11-24-20 @ 04:28) (60 - 70)  BP: 163/84 (11-24-20 @ 04:28) (139/69 - 201/88)  RR: 16 (11-24-20 @ 04:28) (16 - 18)  SpO2: 94% (11-24-20 @ 04:28) (94% - 100%)  Wt(kg):   I&O's Summary    23 Nov 2020 07:01  -  24 Nov 2020 07:00  --------------------------------------------------------  IN: 1605 mL / OUT: 350 mL / NET: 1255 mL      Appearance: NAD	  HEENT: Neck supple 	  Cardiovascular: Normal S1 S2, No JVD, No murmurs  Respiratory: Lungs clear to auscultation	  Psychiatry: A & O x 3, Mood & affect appropriate  Gastrointestinal:  Soft, Non-tender, + BS	  Skin: LCW PPM site C/D/I without hematoma. RCW wound vac site C/D/I  Extremities: Normal range of motion, No clubbing, cyanosis or edema  Vascular: Peripheral pulses palpable 2+ bilaterally      LABS:	 	    CBC Full  -  ( 24 Nov 2020 05:36 )  WBC Count : 10.18 K/uL  Hemoglobin : 11.2 g/dL  Hematocrit : 35.7 %  Platelet Count - Automated : 293 K/uL  Mean Cell Volume : 93.7 fl  Mean Cell Hemoglobin : 29.4 pg  Mean Cell Hemoglobin Concentration : 31.4 gm/dL  Auto Neutrophil # : 7.28 K/uL  Auto Lymphocyte # : 1.68 K/uL  Auto Monocyte # : 0.88 K/uL  Auto Eosinophil # : 0.23 K/uL  Auto Basophil # : 0.04 K/uL  Auto Neutrophil % : 71.5 %  Auto Lymphocyte % : 16.5 %  Auto Monocyte % : 8.6 %  Auto Eosinophil % : 2.3 %  Auto Basophil % : 0.4 %    11-24    140  |  107  |  32<H>  ----------------------------<  108<H>  4.3   |  23  |  1.77<H>  11-23    143  |  108  |  32<H>  ----------------------------<  100<H>  4.2   |  24  |  1.90<H>    Ca    9.5      24 Nov 2020 05:36  Ca    9.8      23 Nov 2020 06:20  Phos  4.3     11-24  Phos  4.2     11-23  Mg     2.1     11-24  Mg     2.2     11-23    TPro  6.4  /  Alb  3.8  /  TBili  0.4  /  DBili  x   /  AST  24  /  ALT  24  /  AlkPhos  68  11-23    Thyroid Stimulating Hormone, Serum (11.18.20 @ 06:31)    Thyroid Stimulating Hormone, Serum: 1.43 uIU/mL      TELEMETRY: A pace/V sense at 60 bpm, had pAFL x ~2 hrs this am.   	    ECG: A pace/ V sense at 60 bpm, QTc 434 ms    	  < from: Xray Chest 2 Views PA/Lat (11.24.20 @ 09:18) >  FINDINGS:  Left-sided chest wall pacemaker. Left atrial appendage clip.  The lungs are clear.  No pleural effusion. No pneumothorax.  Cardiac size is enlarged.  No acute osseous abnormality.      IMPRESSION: No pneumothorax.    < end of copied text >

## 2020-11-25 ENCOUNTER — TRANSCRIPTION ENCOUNTER (OUTPATIENT)
Age: 75
End: 2020-11-25

## 2020-11-25 VITALS
RESPIRATION RATE: 18 BRPM | OXYGEN SATURATION: 96 % | TEMPERATURE: 98 F | SYSTOLIC BLOOD PRESSURE: 118 MMHG | HEART RATE: 69 BPM | DIASTOLIC BLOOD PRESSURE: 62 MMHG

## 2020-11-25 LAB
ALBUMIN SERPL ELPH-MCNC: 3.5 G/DL — SIGNIFICANT CHANGE UP (ref 3.3–5)
ALP SERPL-CCNC: 61 U/L — SIGNIFICANT CHANGE UP (ref 40–120)
ALT FLD-CCNC: 21 U/L — SIGNIFICANT CHANGE UP (ref 10–45)
ANION GAP SERPL CALC-SCNC: 11 MMOL/L — SIGNIFICANT CHANGE UP (ref 5–17)
AST SERPL-CCNC: 17 U/L — SIGNIFICANT CHANGE UP (ref 10–40)
BILIRUB SERPL-MCNC: 0.5 MG/DL — SIGNIFICANT CHANGE UP (ref 0.2–1.2)
BUN SERPL-MCNC: 29 MG/DL — HIGH (ref 7–23)
CALCIUM SERPL-MCNC: 9.4 MG/DL — SIGNIFICANT CHANGE UP (ref 8.4–10.5)
CHLORIDE SERPL-SCNC: 106 MMOL/L — SIGNIFICANT CHANGE UP (ref 96–108)
CO2 SERPL-SCNC: 23 MMOL/L — SIGNIFICANT CHANGE UP (ref 22–31)
CREAT SERPL-MCNC: 1.63 MG/DL — HIGH (ref 0.5–1.3)
GLUCOSE SERPL-MCNC: 95 MG/DL — SIGNIFICANT CHANGE UP (ref 70–99)
HCT VFR BLD CALC: 32.7 % — LOW (ref 34.5–45)
HGB BLD-MCNC: 10.6 G/DL — LOW (ref 11.5–15.5)
MAGNESIUM SERPL-MCNC: 2 MG/DL — SIGNIFICANT CHANGE UP (ref 1.6–2.6)
MCHC RBC-ENTMCNC: 30 PG — SIGNIFICANT CHANGE UP (ref 27–34)
MCHC RBC-ENTMCNC: 32.4 GM/DL — SIGNIFICANT CHANGE UP (ref 32–36)
MCV RBC AUTO: 92.6 FL — SIGNIFICANT CHANGE UP (ref 80–100)
NRBC # BLD: 0 /100 WBCS — SIGNIFICANT CHANGE UP (ref 0–0)
PHOSPHATE SERPL-MCNC: 3.6 MG/DL — SIGNIFICANT CHANGE UP (ref 2.5–4.5)
PLATELET # BLD AUTO: 289 K/UL — SIGNIFICANT CHANGE UP (ref 150–400)
POTASSIUM SERPL-MCNC: 4.3 MMOL/L — SIGNIFICANT CHANGE UP (ref 3.5–5.3)
POTASSIUM SERPL-SCNC: 4.3 MMOL/L — SIGNIFICANT CHANGE UP (ref 3.5–5.3)
PROT SERPL-MCNC: 5.7 G/DL — LOW (ref 6–8.3)
RBC # BLD: 3.53 M/UL — LOW (ref 3.8–5.2)
RBC # FLD: 12.9 % — SIGNIFICANT CHANGE UP (ref 10.3–14.5)
SODIUM SERPL-SCNC: 140 MMOL/L — SIGNIFICANT CHANGE UP (ref 135–145)
WBC # BLD: 11 K/UL — HIGH (ref 3.8–10.5)
WBC # FLD AUTO: 11 K/UL — HIGH (ref 3.8–10.5)

## 2020-11-25 PROCEDURE — 99232 SBSQ HOSP IP/OBS MODERATE 35: CPT

## 2020-11-25 PROCEDURE — 93010 ELECTROCARDIOGRAM REPORT: CPT

## 2020-11-25 PROCEDURE — 99239 HOSP IP/OBS DSCHRG MGMT >30: CPT

## 2020-11-25 RX ORDER — OXYCODONE AND ACETAMINOPHEN 5; 325 MG/1; MG/1
1 TABLET ORAL ONCE
Refills: 0 | Status: DISCONTINUED | OUTPATIENT
Start: 2020-11-25 | End: 2020-11-25

## 2020-11-25 RX ADMIN — CARVEDILOL PHOSPHATE 3.12 MILLIGRAM(S): 80 CAPSULE, EXTENDED RELEASE ORAL at 05:53

## 2020-11-25 RX ADMIN — Medication 40 MILLIGRAM(S): at 08:10

## 2020-11-25 RX ADMIN — Medication 75 MICROGRAM(S): at 05:52

## 2020-11-25 RX ADMIN — Medication 300 MILLIGRAM(S): at 05:52

## 2020-11-25 RX ADMIN — OXYCODONE AND ACETAMINOPHEN 1 TABLET(S): 5; 325 TABLET ORAL at 08:08

## 2020-11-25 RX ADMIN — CHLORHEXIDINE GLUCONATE 1 APPLICATION(S): 213 SOLUTION TOPICAL at 08:17

## 2020-11-25 NOTE — PROGRESS NOTE ADULT - ASSESSMENT
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 Moore for sinus bradycardia to 29, otherwise asymptomatic  She was advised to get the PPM evaluated and saw a Dr. Kent who noted one of the wires was loose  On 11/5, he elected to put in a new wire and leave the loose wire in  Patient with discharge from site and pain  CXR clear, ppm in place, reviewed personally  Given discharge from site, high concern for pocket infection--appreciate EP procedure, removal of PPM (cultures from removal all negative)  MRSA nares negative  S/p PPM re-placement, doing well  Overall,  1) Suspected PPM infection  - Discharge from site in setting of recent device manipulation; now s/p explant PPM 11/18; BCX NGTD   - Cefepime 1g q 12 (monitor CrCl) while inpatient, when DC planning, would send on Keflex 500mg q 6 for 5 more days  - F/U pending cultures  - Wound care per EP  2) L 1st toe pain (gout?)  - Further care per primary team  3) HADLEY  - Monitor to normal, avoid Vanco for now    Harvinder Gamez MD  Pager [phone]  After 5pm and on weekends call [phone]

## 2020-11-25 NOTE — PROGRESS NOTE ADULT - SUBJECTIVE AND OBJECTIVE BOX
Tommie Javed MD  Internal Medicine  Pager #[phone]    Patient is a 75y old  Female who presents with a chief complaint of pacemaker site infection (24 Nov 2020 09:03)      SUBJECTIVE / OVERNIGHT EVENTS:    5 beats wide complex tachycardia.     Percocet given this AM.     Ready for DC    ADDITIONAL REVIEW OF SYSTEMS:    CONSTITUTIONAL: No weakness, fevers or chills  EYES/ENT: No visual changes;  No vertigo or throat pain.  NECK: No pain or stiffness.  RESPIRATORY: No cough, wheezing, hemoptysis; No shortness of breath.  CARDIOVASCULAR: No chest pain or palpitations  GASTROINTESTINAL: No abdominal pain. No nausea, vomiting, diarrhea, constipation, or melena.  GENITOURINARY: No dysuria, frequency or hematuria  NEUROLOGICAL: No numbness or weakness  SKIN: No itching, burning, rashes, or lesions   All other review of systems is negative unless indicated above.    MEDICATIONS  (STANDING):  carvedilol 3.125 milliGRAM(s) Oral every 12 hours  cefepime   IVPB 1000 milliGRAM(s) IV Intermittent every 12 hours  chlorhexidine 2% Cloths 1 Application(s) Topical at bedtime  diltiazem    milliGRAM(s) Oral daily  levothyroxine 50 MICROGram(s) Oral <User Schedule>  levothyroxine 75 MICROGram(s) Oral <User Schedule>  oxycodone    5 mG/acetaminophen 325 mG 1 Tablet(s) Oral once  pitavastatin 2 milliGRAM(s) Oral daily  sotalol 40 milliGRAM(s) Oral two times a day    MEDICATIONS  (PRN):  acetaminophen   Tablet .. 650 milliGRAM(s) Oral every 6 hours PRN Mild Pain (1 - 3), Moderate Pain (4 - 6)  benzocaine 15 mG/menthol 3.6 mG (Sugar-Free) Lozenge 1 Lozenge Oral four times a day PRN Sore Throat  temazepam 30 milliGRAM(s) Oral at bedtime PRN Insomnia      CAPILLARY BLOOD GLUCOSE        I&O's Summary    24 Nov 2020 07:01  -  25 Nov 2020 07:00  --------------------------------------------------------  IN: 2038 mL / OUT: 400 mL / NET: 1638 mL        PHYSICAL EXAM:  Vital Signs Last 24 Hrs  T(C): 36.8 (25 Nov 2020 05:00), Max: 37 (24 Nov 2020 20:17)  T(F): 98.2 (25 Nov 2020 05:00), Max: 98.6 (24 Nov 2020 20:17)  HR: 62 (25 Nov 2020 05:00) (60 - 62)  BP: 170/78 (25 Nov 2020 05:00) (150/87 - 170/78)  BP(mean): --  RR: 16 (25 Nov 2020 05:00) (16 - 17)  SpO2: 97% (25 Nov 2020 05:00) (95% - 99%)    CONSTITUTIONAL: NAD, well-developed  RESPIRATORY: Normal respiratory effort; lungs are clear to auscultation bilaterally  CARDIOVASCULAR: Regular rate, normal S1 and S2, no murmur/rub/gallop; No lower extremity edema; Peripheral pulses are 2+ bilaterally  ABDOMEN: Nontender to palpation, normoactive bowel sounds, no rebound/guarding; No hepatosplenomegaly  MUSCLOSKELETAL: no cyanosis of digits; no joint swelling or tenderness to palpation, full strength all extremities.  NEURO: No focal deficits, CN II-XII grossly intact b/l; sensation to light touch intact in all extremities, gait intact.  PSYCH: A+O to person, place, and time; affect appropriate.    LABS:                        11.2   10.18 )-----------( 293      ( 24 Nov 2020 05:36 )             35.7     11-24    140  |  107  |  32<H>  ----------------------------<  108<H>  4.3   |  23  |  1.77<H>    Ca    9.5      24 Nov 2020 05:36  Phos  4.3     11-24  Mg     2.1     11-24      PT/INR - ( 23 Nov 2020 08:34 )   PT: 13.5 sec;   INR: 1.14 ratio         PTT - ( 23 Nov 2020 08:34 )  PTT:34.6 sec            RADIOLOGY & ADDITIONAL TESTS:  Results Reviewed:   Imaging Personally Reviewed:  Electrocardiogram Personally Reviewed:    COORDINATION OF CARE:  Care Discussed with Consultants/Other Providers [Y/N]:   Prior or Outpatient Records Reviewed [Y/N]:

## 2020-11-25 NOTE — DISCHARGE NOTE NURSING/CASE MANAGEMENT/SOCIAL WORK - PATIENT PORTAL LINK FT
You can access the FollowMyHealth Patient Portal offered by [facility] by registering at the following website: [email or web address]. By joining [facility]’s FollowMyHealth portal, you will also be able to view your health information using other applications (apps) compatible with our system.

## 2020-11-25 NOTE — PROGRESS NOTE ADULT - REASON FOR ADMISSION
pacemaker site infection

## 2020-11-25 NOTE — PROGRESS NOTE ADULT - ATTENDING COMMENTS
74 yo female w/pmh HTN, HLD, hypothyroid, afib (on eliquis), SSS s/p PPM p/w pacemaker site infection. S/p gout flare. PPM has been removed 11/17, re-implanted at new site on 11/23.     PPM site infection  - s/p PPM removal, re-implantation done 11/23  - ID following  - s/p 7 day course of abx, will DC on keflex for additional 5 days  - has outpatient wound vac at bedside, will f/u with wound care for further management  - Follow up in device clinic for wound check as scheduled on 12/3/2020 at 10:40am    Atrial fibrillation, sick sinus syndrome  - EP following  - restarted DOAC - eliquis upon discharge  - send home on sotalol 40mg bid, coreg 3.25 q12, diltiazem 300mg qd, discontinued digoxin    HADLEY  - Cr suddenly bumped to 1.99 from 0.95, today trending down  - had profuse diarrhea, may be pre-renal etiology or intrinsic due to vancomycin  - will have patient f/u with PCP to check BMP in 1 week    Diarrhea - negative c diff, resolved    Medically stable for discharge home.    35 minutes were spent coordinating discharge.

## 2020-11-25 NOTE — PROGRESS NOTE ADULT - PROBLEM SELECTOR PROBLEM 1
Pacemaker infection

## 2020-11-25 NOTE — PROGRESS NOTE ADULT - SUBJECTIVE AND OBJECTIVE BOX
CC: F/U for PPM infection    Saw/spoke to patient. No fevers, no chills. No new complaints.    Allergies  latex (Rash)  penicillin (Rash (Mild))    ANTIMICROBIALS:  cefepime   IVPB 1000 every 12 hours    PE:    Vital Signs Last 24 Hrs  T(C): 36.8 (25 Nov 2020 12:10), Max: 37 (24 Nov 2020 20:17)  T(F): 98.2 (25 Nov 2020 12:10), Max: 98.6 (24 Nov 2020 20:17)  HR: 69 (25 Nov 2020 12:10) (60 - 69)  BP: 118/62 (25 Nov 2020 12:10) (118/62 - 170/78)  RR: 18 (25 Nov 2020 12:10) (16 - 18)  SpO2: 96% (25 Nov 2020 12:10) (95% - 99%)    Gen: AOx3, NAD, non-toxic  CV: S1+S2 normal, nontachycardic; wound vac to PPM removal site; newly placed PPM well appearing, no signs local infection  Resp: Clear bilat, no resp distress, no crackles/wheezes  Abd: Soft, nontender, +BS  Ext: No LE edema, no wounds    LABS:                        10.6   11.00 )-----------( 289      ( 25 Nov 2020 07:03 )             32.7     11-25    140  |  106  |  29<H>  ----------------------------<  95  4.3   |  23  |  1.63<H>    Ca    9.4      25 Nov 2020 07:02  Phos  3.6     11-25  Mg     2.0     11-25    TPro  5.7<L>  /  Alb  3.5  /  TBili  0.5  /  DBili  x   /  AST  17  /  ALT  21  /  AlkPhos  61  11-25    MICROBIOLOGY:    .Other Other  11-18-20   Testing in progress    .Tissue Other  11-18-20   No growth at 5 days  --    No polymorphonuclear cells seen per low power field  No organisms seen per oil power field    .Blood Blood-Peripheral  11-17-20   No Growth Final     Clostridium difficile GDH Toxins A&amp;B, EIA:   Negative (11-20-20 @ 19:44)    RADIOLOGY:    11/24 XR:    FINDINGS:  Left-sided chest wall pacemaker. Left atrial appendage clip.  The lungs are clear.  No pleural effusion. No pneumothorax.  Cardiac size is enlarged.  No acute osseous abnormality.      IMPRESSION: No pneumothorax.

## 2020-11-25 NOTE — PROGRESS NOTE ADULT - PROBLEM SELECTOR PLAN 1
- PPM site infection, PPM removal 11/17, PPM reimplantation Monday (11/23/20)  - cultures NGTD  - cefepime (11/16-11/25 )  - Vanc discontinued 11/20  - EP and ID following  -pacemaker placed 11/23  -case management involvement for wound vac management services.  -keflex 500mg q6h 5 days as outpatient

## 2020-11-27 ENCOUNTER — NON-APPOINTMENT (OUTPATIENT)
Age: 75
End: 2020-11-27

## 2020-11-27 ENCOUNTER — APPOINTMENT (OUTPATIENT)
Dept: ELECTROPHYSIOLOGY | Facility: CLINIC | Age: 75
End: 2020-11-27

## 2020-12-04 ENCOUNTER — NON-APPOINTMENT (OUTPATIENT)
Age: 75
End: 2020-12-04

## 2020-12-04 ENCOUNTER — APPOINTMENT (OUTPATIENT)
Dept: ELECTROPHYSIOLOGY | Facility: CLINIC | Age: 75
End: 2020-12-04
Payer: MEDICARE

## 2020-12-04 VITALS
BODY MASS INDEX: 37.76 KG/M2 | HEIGHT: 61 IN | HEART RATE: 59 BPM | OXYGEN SATURATION: 95 % | DIASTOLIC BLOOD PRESSURE: 78 MMHG | WEIGHT: 200 LBS | SYSTOLIC BLOOD PRESSURE: 147 MMHG

## 2020-12-04 PROCEDURE — 93280 PM DEVICE PROGR EVAL DUAL: CPT

## 2020-12-04 PROCEDURE — 99024 POSTOP FOLLOW-UP VISIT: CPT

## 2020-12-16 LAB
CULTURE RESULTS: SIGNIFICANT CHANGE UP
CULTURE RESULTS: SIGNIFICANT CHANGE UP
SPECIMEN SOURCE: SIGNIFICANT CHANGE UP
SPECIMEN SOURCE: SIGNIFICANT CHANGE UP

## 2020-12-28 PROCEDURE — C9399: CPT

## 2020-12-28 PROCEDURE — 71045 X-RAY EXAM CHEST 1 VIEW: CPT

## 2020-12-28 PROCEDURE — 81001 URINALYSIS AUTO W/SCOPE: CPT

## 2020-12-28 PROCEDURE — 87640 STAPH A DNA AMP PROBE: CPT

## 2020-12-28 PROCEDURE — 83036 HEMOGLOBIN GLYCOSYLATED A1C: CPT

## 2020-12-28 PROCEDURE — C8929: CPT

## 2020-12-28 PROCEDURE — 87102 FUNGUS ISOLATION CULTURE: CPT

## 2020-12-28 PROCEDURE — 86923 COMPATIBILITY TEST ELECTRIC: CPT

## 2020-12-28 PROCEDURE — 80053 COMPREHEN METABOLIC PANEL: CPT

## 2020-12-28 PROCEDURE — C1892: CPT

## 2020-12-28 PROCEDURE — 80061 LIPID PANEL: CPT

## 2020-12-28 PROCEDURE — 76000 FLUOROSCOPY <1 HR PHYS/QHP: CPT

## 2020-12-28 PROCEDURE — 85610 PROTHROMBIN TIME: CPT

## 2020-12-28 PROCEDURE — 86850 RBC ANTIBODY SCREEN: CPT

## 2020-12-28 PROCEDURE — 84550 ASSAY OF BLOOD/URIC ACID: CPT

## 2020-12-28 PROCEDURE — 80202 ASSAY OF VANCOMYCIN: CPT

## 2020-12-28 PROCEDURE — 86769 SARS-COV-2 COVID-19 ANTIBODY: CPT

## 2020-12-28 PROCEDURE — 87116 MYCOBACTERIA CULTURE: CPT

## 2020-12-28 PROCEDURE — C1894: CPT

## 2020-12-28 PROCEDURE — 87449 NOS EACH ORGANISM AG IA: CPT

## 2020-12-28 PROCEDURE — 84443 ASSAY THYROID STIM HORMONE: CPT

## 2020-12-28 PROCEDURE — 85027 COMPLETE CBC AUTOMATED: CPT

## 2020-12-28 PROCEDURE — C2629: CPT

## 2020-12-28 PROCEDURE — C1785: CPT

## 2020-12-28 PROCEDURE — 85025 COMPLETE CBC W/AUTO DIFF WBC: CPT

## 2020-12-28 PROCEDURE — 83735 ASSAY OF MAGNESIUM: CPT

## 2020-12-28 PROCEDURE — 87015 SPECIMEN INFECT AGNT CONCNTJ: CPT

## 2020-12-28 PROCEDURE — 99285 EMERGENCY DEPT VISIT HI MDM: CPT | Mod: 25

## 2020-12-28 PROCEDURE — 87641 MR-STAPH DNA AMP PROBE: CPT

## 2020-12-28 PROCEDURE — 71046 X-RAY EXAM CHEST 2 VIEWS: CPT

## 2020-12-28 PROCEDURE — 97161 PT EVAL LOW COMPLEX 20 MIN: CPT

## 2020-12-28 PROCEDURE — 84300 ASSAY OF URINE SODIUM: CPT

## 2020-12-28 PROCEDURE — 86900 BLOOD TYPING SEROLOGIC ABO: CPT

## 2020-12-28 PROCEDURE — C1769: CPT

## 2020-12-28 PROCEDURE — 87324 CLOSTRIDIUM AG IA: CPT

## 2020-12-28 PROCEDURE — 87206 SMEAR FLUORESCENT/ACID STAI: CPT

## 2020-12-28 PROCEDURE — C1773: CPT

## 2020-12-28 PROCEDURE — C1887: CPT

## 2020-12-28 PROCEDURE — 86901 BLOOD TYPING SEROLOGIC RH(D): CPT

## 2020-12-28 PROCEDURE — U0003: CPT

## 2020-12-28 PROCEDURE — 97605 NEG PRS WND THER DME<=50SQCM: CPT

## 2020-12-28 PROCEDURE — 85730 THROMBOPLASTIN TIME PARTIAL: CPT

## 2020-12-28 PROCEDURE — 86803 HEPATITIS C AB TEST: CPT

## 2020-12-28 PROCEDURE — C1889: CPT

## 2020-12-28 PROCEDURE — 33208 INSRT HEART PM ATRIAL & VENT: CPT

## 2020-12-28 PROCEDURE — 87070 CULTURE OTHR SPECIMN AEROBIC: CPT

## 2020-12-28 PROCEDURE — 93005 ELECTROCARDIOGRAM TRACING: CPT

## 2020-12-28 PROCEDURE — 82570 ASSAY OF URINE CREATININE: CPT

## 2020-12-28 PROCEDURE — 87075 CULTR BACTERIA EXCEPT BLOOD: CPT

## 2020-12-28 PROCEDURE — 80048 BASIC METABOLIC PNL TOTAL CA: CPT

## 2020-12-28 PROCEDURE — C1898: CPT

## 2020-12-28 PROCEDURE — 84100 ASSAY OF PHOSPHORUS: CPT

## 2020-12-28 PROCEDURE — 87040 BLOOD CULTURE FOR BACTERIA: CPT

## 2021-01-06 LAB
CULTURE RESULTS: SIGNIFICANT CHANGE UP
SPECIMEN SOURCE: SIGNIFICANT CHANGE UP

## 2021-01-07 ENCOUNTER — RX RENEWAL (OUTPATIENT)
Age: 76
End: 2021-01-07

## 2021-02-23 ENCOUNTER — APPOINTMENT (OUTPATIENT)
Dept: ELECTROPHYSIOLOGY | Facility: CLINIC | Age: 76
End: 2021-02-23

## 2021-02-24 ENCOUNTER — RX RENEWAL (OUTPATIENT)
Age: 76
End: 2021-02-24

## 2021-03-04 DIAGNOSIS — E78.5 HYPERLIPIDEMIA, UNSPECIFIED: ICD-10-CM

## 2021-03-04 DIAGNOSIS — E03.9 HYPOTHYROIDISM, UNSPECIFIED: ICD-10-CM

## 2021-03-08 ENCOUNTER — APPOINTMENT (OUTPATIENT)
Dept: ELECTROPHYSIOLOGY | Facility: CLINIC | Age: 76
End: 2021-03-08
Payer: MEDICARE

## 2021-03-08 ENCOUNTER — NON-APPOINTMENT (OUTPATIENT)
Age: 76
End: 2021-03-08

## 2021-03-08 VITALS — SYSTOLIC BLOOD PRESSURE: 146 MMHG | DIASTOLIC BLOOD PRESSURE: 86 MMHG | OXYGEN SATURATION: 99 % | HEART RATE: 62 BPM

## 2021-03-08 PROCEDURE — 93280 PM DEVICE PROGR EVAL DUAL: CPT

## 2021-03-08 PROCEDURE — 93000 ELECTROCARDIOGRAM COMPLETE: CPT | Mod: 59

## 2021-03-08 PROCEDURE — 99215 OFFICE O/P EST HI 40 MIN: CPT

## 2021-03-08 RX ORDER — CARVEDILOL 3.12 MG/1
3.12 TABLET, FILM COATED ORAL TWICE DAILY
Qty: 180 | Refills: 0 | Status: DISCONTINUED | COMMUNITY
Start: 2020-12-04 | End: 2021-03-08

## 2021-03-08 RX ORDER — SOTALOL HYDROCHLORIDE 80 MG/1
80 TABLET ORAL
Qty: 90 | Refills: 2 | Status: DISCONTINUED | COMMUNITY
Start: 2020-12-04 | End: 2021-03-08

## 2021-03-10 NOTE — HISTORY OF PRESENT ILLNESS
[FreeTextEntry1] : I had the pleasure of seeing your patient Ms. Sharri Orozco today in the arrhythmia clinic of [facility]. As you well know this is a delightful 75 woamn with a history of hypertension, hyperlipidemia, hypothyroidism, paroxysmal atrial fibrillation (on Xarelto), ILR, sick sinus syndrome. She is underwent right sided Biotronik PPM implant approximately 12 years ago in [place] with generator change in 2018. 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 Her RV lead was capped and new RV lead implanted by Dr. Kent in [facility]. She unfortunately developed a pacemaker pocket infection initially treated with oral antibiotics but ultimately required extraction/reimplantation on 11/17/2020.  On 11/23/2020 she underwent reimplantation of a left sided Abbott dual chamber PPM. She had been treated with low does Sotolol (40mg BID) for some time. \par \par She presents today for follow up. She reports a stressful last 6 months as she was recovering from PPM pocket infection as well as receiving diagnosis of skin cancer. Her PPM incision sites have healed beautifully. Currently denies chest pain, palpitations, shortness of breath, lightheadedness, dizziness, or feelings of near syncope.  \par \par Echocardiogram from 11/2020 revealed normal LV systolic function with ejection fraction of 60-65%. LA size is normal at 3.6cm. Minimal MR noted. \par \par Today, her EKG showed sinus rhythm, atrial pacing, with right bundle branch block. Her blood pressure is 146/86 and her pulse is 62 bpm and regular. Her dual chamber PPM interrogation revealed a normally functioning device with normal pacing and sensing thresholds. Her intrinsic P waves are >5.0 mV, atrial impedance is 360 ohms, and atrial capture threshold is 0.75 V @ 0.5 ms. Her ventricular intrinsic amplitude is > 2.7 mV, pacing impedance is 680 ohms, and pacing threshold is 0.75 V @ 0.5 ms. Multiple episodes of non-sustained AF with mostly controlled ventricular rates. Total AT/AF burden 17%. Battery life approximately 8.3 years. \par \par She is currently managed on Coreg 3.125mg, Diltiazem 300mg, Sotalol 80mg BID and continues to experience breakthrough episodes of AF. She is overtly asymptomatic but does report occasional episodes of dyspnea on exertion.

## 2021-03-10 NOTE — REASON FOR VISIT
[Follow-Up - Clinic] : a clinic follow-up of [Atrial Fibrillation] : atrial fibrillation [Pacemaker Evaluation] : pacemaker ~T evaluation ~C was performed [Sick Sinus Syndrome] : sick sinus syndrome

## 2021-03-10 NOTE — PHYSICAL EXAM
[General Appearance - Well Developed] : well developed [] : no respiratory distress [Heart Sounds] : normal S1 and S2 [Abnormal Walk] : normal gait [FreeTextEntry1] : L and R infraclavicular surgical incisions healed well

## 2021-03-10 NOTE — DISCUSSION/SUMMARY
[FreeTextEntry1] : In summary, Ms. Orozco is a 75 year old F with CHADSVASC 3 paroxysmal atrial fibrillation with continued breakthrough episodes of AF despite management with Sotalol, Coreg, and Diltiazem. The underlying pathophysiology of atrial fibrillation and treatment options were discussed.  Options regarding management discussed, including rate control strategy with AV ana maria blocking agents, or rhythm control strategies employing antiarrhythmic drugs and/or catheter ablation were reviewed. The patient would prefer to avoid any procedures at this point. \par \par She is breaking through on sotolol and has some renal issues that prevent going much higher on the dose. We therefore recommend changing to Flecainide 50mg BID, in additional to increasing Coreg to 6.25mg BID for management moving forward. Would recommend she undergo an exercise stress test in the next two weeks to ensure QRS complex does not increase further at higher rates, as a results of use-dependence. She will continue Eliquis 5mg BID for TE prophylaxis.\par She will follow up in our office in three months. Ms. Orozco appeared to understand the whole discussion and verbalized that all of her questions were answered to his satisfaction.

## 2021-04-02 ENCOUNTER — RX RENEWAL (OUTPATIENT)
Age: 76
End: 2021-04-02

## 2021-04-23 ENCOUNTER — APPOINTMENT (OUTPATIENT)
Dept: ELECTROPHYSIOLOGY | Facility: CLINIC | Age: 76
End: 2021-04-23
Payer: MEDICARE

## 2021-04-23 VITALS
SYSTOLIC BLOOD PRESSURE: 137 MMHG | BODY MASS INDEX: 37.76 KG/M2 | OXYGEN SATURATION: 99 % | HEIGHT: 61 IN | DIASTOLIC BLOOD PRESSURE: 83 MMHG | WEIGHT: 200 LBS | HEART RATE: 59 BPM

## 2021-04-23 PROCEDURE — 93280 PM DEVICE PROGR EVAL DUAL: CPT

## 2021-05-03 ENCOUNTER — NON-APPOINTMENT (OUTPATIENT)
Age: 76
End: 2021-05-03

## 2021-05-03 ENCOUNTER — RX RENEWAL (OUTPATIENT)
Age: 76
End: 2021-05-03

## 2021-05-07 ENCOUNTER — APPOINTMENT (OUTPATIENT)
Dept: ELECTROPHYSIOLOGY | Facility: CLINIC | Age: 76
End: 2021-05-07
Payer: MEDICARE

## 2021-05-07 VITALS — OXYGEN SATURATION: 99 % | HEIGHT: 61 IN | HEART RATE: 65 BPM

## 2021-05-07 PROCEDURE — 93280 PM DEVICE PROGR EVAL DUAL: CPT

## 2021-05-12 ENCOUNTER — APPOINTMENT (OUTPATIENT)
Dept: ELECTROPHYSIOLOGY | Facility: CLINIC | Age: 76
End: 2021-05-12
Payer: MEDICARE

## 2021-05-12 VITALS
HEART RATE: 67 BPM | SYSTOLIC BLOOD PRESSURE: 143 MMHG | BODY MASS INDEX: 37.95 KG/M2 | WEIGHT: 201 LBS | DIASTOLIC BLOOD PRESSURE: 77 MMHG | OXYGEN SATURATION: 99 % | HEIGHT: 61 IN

## 2021-05-12 PROCEDURE — 93280 PM DEVICE PROGR EVAL DUAL: CPT

## 2021-06-14 ENCOUNTER — NON-APPOINTMENT (OUTPATIENT)
Age: 76
End: 2021-06-14

## 2021-06-14 ENCOUNTER — APPOINTMENT (OUTPATIENT)
Dept: ELECTROPHYSIOLOGY | Facility: CLINIC | Age: 76
End: 2021-06-14
Payer: MEDICARE

## 2021-06-14 ENCOUNTER — RX RENEWAL (OUTPATIENT)
Age: 76
End: 2021-06-14

## 2021-06-14 VITALS
OXYGEN SATURATION: 99 % | HEART RATE: 60 BPM | SYSTOLIC BLOOD PRESSURE: 131 MMHG | DIASTOLIC BLOOD PRESSURE: 81 MMHG | HEIGHT: 61 IN | WEIGHT: 200 LBS | BODY MASS INDEX: 37.76 KG/M2

## 2021-06-14 PROCEDURE — 93280 PM DEVICE PROGR EVAL DUAL: CPT

## 2021-06-14 PROCEDURE — 93000 ELECTROCARDIOGRAM COMPLETE: CPT | Mod: 59

## 2021-06-14 PROCEDURE — 99214 OFFICE O/P EST MOD 30 MIN: CPT

## 2021-06-14 RX ORDER — CARVEDILOL 6.25 MG/1
6.25 TABLET, FILM COATED ORAL TWICE DAILY
Qty: 60 | Refills: 3 | Status: DISCONTINUED | COMMUNITY
Start: 2021-03-08 | End: 2021-06-14

## 2021-06-14 RX ORDER — FUROSEMIDE 10 MG/ML
10 INJECTION, SOLUTION INTRAMUSCULAR; INTRAVENOUS
Refills: 0 | Status: DISCONTINUED | COMMUNITY
Start: 2021-03-08 | End: 2021-06-14

## 2021-06-14 RX ORDER — PITAVASTATIN CALCIUM 4.18 MG/1
4 TABLET, FILM COATED ORAL DAILY
Refills: 0 | Status: ACTIVE | COMMUNITY
Start: 2021-03-26

## 2021-06-14 RX ORDER — DOXYCYCLINE HYCLATE 100 MG/1
100 CAPSULE ORAL
Qty: 10 | Refills: 0 | Status: DISCONTINUED | COMMUNITY
Start: 2021-01-28

## 2021-06-14 RX ORDER — TEMAZEPAM 30 MG/1
30 CAPSULE ORAL
Qty: 30 | Refills: 0 | Status: ACTIVE | COMMUNITY
Start: 2021-01-28

## 2021-06-14 RX ORDER — PITAVASTATIN CALCIUM 1.04 MG/1
1 TABLET, FILM COATED ORAL
Qty: 60 | Refills: 0 | Status: DISCONTINUED | COMMUNITY
Start: 2021-02-12

## 2021-06-14 RX ORDER — MUPIROCIN 20 MG/G
2 OINTMENT TOPICAL
Qty: 22 | Refills: 0 | Status: DISCONTINUED | COMMUNITY
Start: 2021-02-02

## 2021-06-14 RX ORDER — LEVOTHYROXINE SODIUM 75 UG/1
75 TABLET ORAL
Refills: 0 | Status: ACTIVE | COMMUNITY
Start: 2020-12-22

## 2021-06-14 RX ORDER — PITAVASTATIN CALCIUM 2.09 MG/1
2 TABLET, FILM COATED ORAL
Qty: 90 | Refills: 0 | Status: DISCONTINUED | COMMUNITY
Start: 2020-07-08

## 2021-06-14 RX ORDER — CLINDAMYCIN HYDROCHLORIDE 150 MG/1
150 CAPSULE ORAL
Qty: 12 | Refills: 0 | Status: DISCONTINUED | COMMUNITY
Start: 2021-01-20

## 2021-06-14 RX ORDER — HYDROCORTISONE 25 MG/G
2.5 CREAM TOPICAL
Qty: 28 | Refills: 0 | Status: DISCONTINUED | COMMUNITY
Start: 2021-01-07

## 2021-06-14 RX ORDER — LEVOTHYROXINE SODIUM 50 UG/1
50 TABLET ORAL
Refills: 0 | Status: ACTIVE | COMMUNITY
Start: 2020-12-04

## 2021-06-14 RX ORDER — KETOCONAZOLE 20 MG/G
2 CREAM TOPICAL
Qty: 60 | Refills: 0 | Status: ACTIVE | COMMUNITY
Start: 2021-01-07

## 2021-06-14 RX ORDER — CARVEDILOL 25 MG/1
25 TABLET, FILM COATED ORAL
Qty: 180 | Refills: 3 | Status: ACTIVE | COMMUNITY
Start: 2020-12-14

## 2021-06-14 NOTE — REASON FOR VISIT
[Follow-Up - Clinic] : a clinic follow-up of [Pacemaker Evaluation] : pacemaker ~T evaluation ~C was performed [Atrial Fibrillation] : atrial fibrillation [Sick Sinus Syndrome] : sick sinus syndrome

## 2021-06-17 NOTE — DISCUSSION/SUMMARY
[FreeTextEntry1] : In summary, Ms. Orozco is a 76 year old female with CHADSVASC 3, paroxysmal atrial fibrillation and a dual chamber pacemaker. She continues to experience dyspnea on exertion. We had the patient walk in the hallways and after a short time she became dyspneic and her heart rate went from 60bpm to high 60s bpm. Her rate histogram as well as her prior stress test demonstrates some degree of chronotropic incompetence which may be contributing to her symptoms. We made some adjustments to her pacemaker including adjusting her max sensor rate from 120 bpm to 130 bpm, changing her reaction time from slow to medium, changing the slope from 4 to 6 and lastly adjusting her rate response threshold from 5 to 3. \par In addition, the patient is following up with her pulmonologist who will be performing some tests to determine if there may be a pulmonary component to her symptoms. She will follow up with us after her pulmonary work up is completed. We will reassess at that time if her symptoms have improved and make any further recommendation based off of her pulmonary work up. Ms. Orozco appeared to understand the whole discussion and verbalized that all of her questions were answered to his satisfaction.
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
## 2021-06-17 NOTE — PHYSICAL EXAM
[] : no respiratory distress [General Appearance - Well Developed] : well developed [Heart Sounds] : normal S1 and S2 [Abnormal Walk] : normal gait [Well Groomed] : well groomed [General Appearance - Well Nourished] : well nourished [Respiration, Rhythm And Depth] : normal respiratory rhythm and effort [Auscultation Breath Sounds / Voice Sounds] : lungs were clear to auscultation bilaterally [Murmurs] : no murmurs present [Arterial Pulses Normal] : the arterial pulses were normal [Edema] : no peripheral edema present [FreeTextEntry1] : L and R infraclavicular surgical incisions healed well

## 2021-06-26 NOTE — DISCHARGE NOTE PROVIDER - NSDCHHCONTACT_GEN_ALL_CORE_FT
As certified below, I, or a nurse practitioner or physician assistant working with me, had a face-to-face encounter that meets the physician face-to-face encounter requirements.
26-Jun-2021 07:14

## 2021-07-29 NOTE — PROGRESS NOTE ADULT - ASSESSMENT
75y F PMHx HTN, HLD, hypothyroid, afib (on eliquis), sinus bradycardia s/p PPM p/w pacemaker site infection and toe pain.   Patient s/p right thoracentesis.    No acute evnts overnight.  Pt still states he is SOB, but has improved since procedure  Insertion site is CDI.    Fluid is Exudative,  gs shows no growth,  neg afb.  culture and cytology pending  Febrile last night.  intermittent tachycardia, other VSS, no leukocytosis        Per Pulm Note:  Was admitted here in July 2020 with cough, fever, poor appetite and weight loss. At that time found to have hyperglycemia, RUL lung opacities.   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  07/24/20: Had bronchoscopy with BAL and biopsy( showed chronic inflammation, fibrosis).  Sedrate less than 5 x 2.    HIV negative.  ACE level normal.  DANIELLE, C-ANCA, P- ANCA  negative.  Hepatitis negative.  Suspicious for MTB was low, got discharged,  and claims that had a tele visit with DR. Riddle, no meds started.    Plan  -f/u final culture and cytology results  -continue abx

## 2021-08-19 ENCOUNTER — RX RENEWAL (OUTPATIENT)
Age: 76
End: 2021-08-19

## 2021-09-13 ENCOUNTER — NON-APPOINTMENT (OUTPATIENT)
Age: 76
End: 2021-09-13

## 2021-09-13 ENCOUNTER — APPOINTMENT (OUTPATIENT)
Dept: ELECTROPHYSIOLOGY | Facility: CLINIC | Age: 76
End: 2021-09-13
Payer: MEDICARE

## 2021-09-13 PROCEDURE — 93294 REM INTERROG EVL PM/LDLS PM: CPT

## 2021-09-13 PROCEDURE — 93296 REM INTERROG EVL PM/IDS: CPT

## 2021-10-13 ENCOUNTER — RX RENEWAL (OUTPATIENT)
Age: 76
End: 2021-10-13

## 2021-12-06 ENCOUNTER — RX RENEWAL (OUTPATIENT)
Age: 76
End: 2021-12-06

## 2021-12-13 ENCOUNTER — APPOINTMENT (OUTPATIENT)
Dept: ELECTROPHYSIOLOGY | Facility: CLINIC | Age: 76
End: 2021-12-13
Payer: MEDICARE

## 2021-12-13 ENCOUNTER — NON-APPOINTMENT (OUTPATIENT)
Age: 76
End: 2021-12-13

## 2021-12-13 PROCEDURE — 93296 REM INTERROG EVL PM/IDS: CPT | Mod: NC

## 2021-12-13 PROCEDURE — 93294 REM INTERROG EVL PM/LDLS PM: CPT | Mod: NC

## 2022-01-10 ENCOUNTER — RX RENEWAL (OUTPATIENT)
Age: 77
End: 2022-01-10

## 2022-01-13 ENCOUNTER — RX RENEWAL (OUTPATIENT)
Age: 77
End: 2022-01-13

## 2022-02-28 ENCOUNTER — NON-APPOINTMENT (OUTPATIENT)
Age: 77
End: 2022-02-28

## 2022-02-28 ENCOUNTER — APPOINTMENT (OUTPATIENT)
Dept: ELECTROPHYSIOLOGY | Facility: CLINIC | Age: 77
End: 2022-02-28
Payer: MEDICARE

## 2022-02-28 VITALS
WEIGHT: 200 LBS | SYSTOLIC BLOOD PRESSURE: 139 MMHG | HEIGHT: 61 IN | HEART RATE: 109 BPM | OXYGEN SATURATION: 99 % | BODY MASS INDEX: 37.76 KG/M2 | DIASTOLIC BLOOD PRESSURE: 97 MMHG

## 2022-02-28 PROCEDURE — 93280 PM DEVICE PROGR EVAL DUAL: CPT

## 2022-02-28 PROCEDURE — 93000 ELECTROCARDIOGRAM COMPLETE: CPT | Mod: 59

## 2022-03-04 ENCOUNTER — NON-APPOINTMENT (OUTPATIENT)
Age: 77
End: 2022-03-04

## 2022-03-06 ENCOUNTER — OUTPATIENT (OUTPATIENT)
Dept: OUTPATIENT SERVICES | Facility: HOSPITAL | Age: 77
LOS: 1 days | End: 2022-03-06
Payer: MEDICARE

## 2022-03-06 DIAGNOSIS — Z95.0 PRESENCE OF CARDIAC PACEMAKER: Chronic | ICD-10-CM

## 2022-03-06 DIAGNOSIS — Z11.52 ENCOUNTER FOR SCREENING FOR COVID-19: ICD-10-CM

## 2022-03-06 DIAGNOSIS — Z90.49 ACQUIRED ABSENCE OF OTHER SPECIFIED PARTS OF DIGESTIVE TRACT: Chronic | ICD-10-CM

## 2022-03-06 LAB — SARS-COV-2 RNA SPEC QL NAA+PROBE: SIGNIFICANT CHANGE UP

## 2022-03-06 PROCEDURE — C9803: CPT

## 2022-03-06 PROCEDURE — U0003: CPT

## 2022-03-06 PROCEDURE — U0005: CPT

## 2022-03-08 ENCOUNTER — OUTPATIENT (OUTPATIENT)
Dept: OUTPATIENT SERVICES | Facility: HOSPITAL | Age: 77
LOS: 1 days | End: 2022-03-08
Payer: MEDICARE

## 2022-03-08 VITALS
DIASTOLIC BLOOD PRESSURE: 90 MMHG | SYSTOLIC BLOOD PRESSURE: 128 MMHG | HEART RATE: 66 BPM | RESPIRATION RATE: 16 BRPM | OXYGEN SATURATION: 97 %

## 2022-03-08 VITALS — HEIGHT: 60 IN | WEIGHT: 184.97 LBS

## 2022-03-08 DIAGNOSIS — Z90.49 ACQUIRED ABSENCE OF OTHER SPECIFIED PARTS OF DIGESTIVE TRACT: Chronic | ICD-10-CM

## 2022-03-08 DIAGNOSIS — I48.0 PAROXYSMAL ATRIAL FIBRILLATION: ICD-10-CM

## 2022-03-08 DIAGNOSIS — Z95.0 PRESENCE OF CARDIAC PACEMAKER: Chronic | ICD-10-CM

## 2022-03-08 LAB
ANION GAP SERPL CALC-SCNC: 11 MMOL/L — SIGNIFICANT CHANGE UP (ref 5–17)
BUN SERPL-MCNC: 23 MG/DL — SIGNIFICANT CHANGE UP (ref 7–23)
CALCIUM SERPL-MCNC: 9.2 MG/DL — SIGNIFICANT CHANGE UP (ref 8.4–10.5)
CHLORIDE SERPL-SCNC: 107 MMOL/L — SIGNIFICANT CHANGE UP (ref 96–108)
CO2 SERPL-SCNC: 27 MMOL/L — SIGNIFICANT CHANGE UP (ref 22–31)
CREAT SERPL-MCNC: 0.93 MG/DL — SIGNIFICANT CHANGE UP (ref 0.5–1.3)
EGFR: 64 ML/MIN/1.73M2 — SIGNIFICANT CHANGE UP
GLUCOSE SERPL-MCNC: 103 MG/DL — HIGH (ref 70–99)
HCT VFR BLD CALC: 45.7 % — HIGH (ref 34.5–45)
HGB BLD-MCNC: 14.9 G/DL — SIGNIFICANT CHANGE UP (ref 11.5–15.5)
INR BLD: 1.67 RATIO — HIGH (ref 0.88–1.16)
MCHC RBC-ENTMCNC: 31 PG — SIGNIFICANT CHANGE UP (ref 27–34)
MCHC RBC-ENTMCNC: 32.6 GM/DL — SIGNIFICANT CHANGE UP (ref 32–36)
MCV RBC AUTO: 95 FL — SIGNIFICANT CHANGE UP (ref 80–100)
NRBC # BLD: 0 /100 WBCS — SIGNIFICANT CHANGE UP (ref 0–0)
PLATELET # BLD AUTO: 246 K/UL — SIGNIFICANT CHANGE UP (ref 150–400)
POTASSIUM SERPL-MCNC: 4.4 MMOL/L — SIGNIFICANT CHANGE UP (ref 3.5–5.3)
POTASSIUM SERPL-SCNC: 4.4 MMOL/L — SIGNIFICANT CHANGE UP (ref 3.5–5.3)
PROTHROM AB SERPL-ACNC: 19.3 SEC — HIGH (ref 10.5–13.4)
RBC # BLD: 4.81 M/UL — SIGNIFICANT CHANGE UP (ref 3.8–5.2)
RBC # FLD: 13 % — SIGNIFICANT CHANGE UP (ref 10.3–14.5)
SODIUM SERPL-SCNC: 145 MMOL/L — SIGNIFICANT CHANGE UP (ref 135–145)
WBC # BLD: 6.87 K/UL — SIGNIFICANT CHANGE UP (ref 3.8–10.5)
WBC # FLD AUTO: 6.87 K/UL — SIGNIFICANT CHANGE UP (ref 3.8–10.5)

## 2022-03-08 PROCEDURE — 80048 BASIC METABOLIC PNL TOTAL CA: CPT

## 2022-03-08 PROCEDURE — 93010 ELECTROCARDIOGRAM REPORT: CPT

## 2022-03-08 PROCEDURE — 99223 1ST HOSP IP/OBS HIGH 75: CPT | Mod: 25

## 2022-03-08 PROCEDURE — 85610 PROTHROMBIN TIME: CPT

## 2022-03-08 PROCEDURE — 92960 CARDIOVERSION ELECTRIC EXT: CPT

## 2022-03-08 PROCEDURE — 85027 COMPLETE CBC AUTOMATED: CPT

## 2022-03-08 PROCEDURE — 93005 ELECTROCARDIOGRAM TRACING: CPT

## 2022-03-08 RX ORDER — DILTIAZEM HCL 120 MG
1 CAPSULE, EXT RELEASE 24 HR ORAL
Qty: 0 | Refills: 0 | DISCHARGE

## 2022-03-08 RX ORDER — FLECAINIDE ACETATE 50 MG
1 TABLET ORAL
Qty: 0 | Refills: 0 | DISCHARGE

## 2022-03-08 RX ORDER — FUROSEMIDE 40 MG
1 TABLET ORAL
Qty: 0 | Refills: 0 | DISCHARGE

## 2022-03-08 RX ORDER — LEVOTHYROXINE SODIUM 125 MCG
0 TABLET ORAL
Qty: 0 | Refills: 0 | DISCHARGE

## 2022-03-08 RX ORDER — LEVOTHYROXINE SODIUM 125 MCG
1 TABLET ORAL
Qty: 0 | Refills: 0 | DISCHARGE

## 2022-03-08 RX ORDER — CARVEDILOL PHOSPHATE 80 MG/1
1 CAPSULE, EXTENDED RELEASE ORAL
Qty: 0 | Refills: 0 | DISCHARGE

## 2022-03-08 RX ORDER — SODIUM CHLORIDE 9 MG/ML
3 INJECTION INTRAMUSCULAR; INTRAVENOUS; SUBCUTANEOUS EVERY 8 HOURS
Refills: 0 | Status: DISCONTINUED | OUTPATIENT
Start: 2022-03-08 | End: 2022-03-22

## 2022-03-08 RX ORDER — TEMAZEPAM 15 MG/1
1 CAPSULE ORAL
Qty: 0 | Refills: 0 | DISCHARGE

## 2022-03-08 NOTE — H&P CARDIOLOGY - HISTORY OF PRESENT ILLNESS
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 She had a R sided dual chamber PPM placed 2.5 years ago by a Dr. Moore for sinus bradycardia to 29, otherwise asymptomatic. She was without complications until 10/30 when her cardiology office [place] received a transmission from her PPM that showed "something wrong". She was advised to get the PPM evaluated and saw a Dr. Kent who noted one of the wires was loose. On11/5, he elected to put in a new wire and leave the loose wire in. However, a few days after that procedure the pt started noticing purulent discharge from the pacemaker incision site and presented to [facility]. She was prescribed a 3 day course of clindamycin but the purulent drainage did not resolved. She then called went back to [place] and has since completed 5 out of 20 day course of levofloxacin. On 11/14 she noticed acute sharp L 1st toe foot pain. denies trauma, bleeding, or discharge. Pt reports no hx of gout or pseudogout. She called Dr. Moore's office who recommended she come to [facility] for PPM removal and further evaluation. Pt still reports drainage from the ppm incision site.    Of note, pt was advised to stop taking her eliquis and diltiazem on 11/13 as she may get a procedure so she has not taken these medications since 11/13 pm.     ED course:   afebrile, HR 68, /102, RR 18, 97% on RA  Received CXR 76y F PMHx HTN, HLD, hypothyroid, afib (on eliquis last dose 3/8/21 7am), 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 She had a R sided dual chamber PPM placed 2.5 years ago by a Dr. Moore for sinus bradycardia to 29, otherwise asymptomatic. now for cardioversion

## 2022-03-08 NOTE — ASU PATIENT PROFILE, ADULT - FALL HARM RISK - UNIVERSAL INTERVENTIONS
Bed in lowest position, wheels locked, appropriate side rails in place/Call bell, personal items and telephone in reach/Instruct patient to call for assistance before getting out of bed or chair/Non-slip footwear when patient is out of bed/[place] to call system/Physically safe environment - no spills, clutter or unnecessary equipment/Purposeful Proactive Rounding/Room/bathroom lighting operational, light cord in reach

## 2022-03-08 NOTE — ASU PATIENT PROFILE, ADULT - FALL HARM RISK - PATIENT NEEDS ASSISTANCE
Russell Lawrence is a 35 year old male presents complaining of: Right side face pain. Pt states \"can't tell the difference if it is the tooth or the ear\".    Triage Questions:  1. Do you have a fever, chills, repeated shaking with chills or have you had a fever reducer within the last 12 hours? none   Temperature 98.2 F temporal  2. Do you have a new cough, cold symptoms, flu symptoms, runny/stuffy nose, bronchitis, sore throat, difficulty breathing? none  3. Do you have a new onset of extreme fatigue? No  4. Do you have any nausea, vomiting, abdominal pain, lack of appetite, diarrhea?  none  5. Have you had a sudden onset of loss of taste or smell, muscle pain or headache? none  6. Have you had contact with a known positive COVID-19 case within the last 14 days OR do any of your household members have any of the above symptoms or tested positive for COVID-19 in the last 14 days? No    Other pertinent clinical findings: None    Disposition:  Case discussed with provider: No  If patient answered no to any triage question and temperature < 100.4 OR medical emergency, then patient is appropriate for non-URI site.     This patient is appropriate for non-URI site.  Since the patient is appropriate to be seen, the patient is directed to general waiting room to wait for available room.    Patient was masked.    Triage RN wearing full Personal Protective Equipment.     No assistance needed

## 2022-03-08 NOTE — PRE-ANESTHESIA EVALUATION ADULT - NSDENTALSD_ENT_ALL_CORE
Airway  Urgency: elective    Date/Time: 10/5/2021 7:55 AM  Airway not difficult    General Information and Staff    Patient location during procedure: OR  Anesthesiologist: James Weinberg MD  CRNA: Lynn Ling CRNA    Indications and Patient Condition  Indications for airway management: airway protection    Preoxygenated: yes  Mask difficulty assessment: 0 - not attempted    Final Airway Details  Final airway type: supraglottic airway      Successful airway: classic  Size 4    Number of attempts at approach: 1  Assessment: lips, teeth, and gum same as pre-op    Additional Comments  LMA placed with no trauma noted. Patient tolerated well. Good seal. Secured.            
appears normal and intact

## 2022-03-21 ENCOUNTER — RX RENEWAL (OUTPATIENT)
Age: 77
End: 2022-03-21

## 2022-03-28 ENCOUNTER — APPOINTMENT (OUTPATIENT)
Dept: ELECTROPHYSIOLOGY | Facility: CLINIC | Age: 77
End: 2022-03-28
Payer: MEDICARE

## 2022-03-28 ENCOUNTER — NON-APPOINTMENT (OUTPATIENT)
Age: 77
End: 2022-03-28

## 2022-03-28 VITALS
OXYGEN SATURATION: 97 % | DIASTOLIC BLOOD PRESSURE: 89 MMHG | HEART RATE: 57 BPM | WEIGHT: 184 LBS | HEIGHT: 61 IN | RESPIRATION RATE: 14 BRPM | BODY MASS INDEX: 34.74 KG/M2 | SYSTOLIC BLOOD PRESSURE: 170 MMHG

## 2022-03-28 PROCEDURE — 93000 ELECTROCARDIOGRAM COMPLETE: CPT | Mod: 59

## 2022-03-28 PROCEDURE — 99214 OFFICE O/P EST MOD 30 MIN: CPT

## 2022-03-28 PROCEDURE — 93280 PM DEVICE PROGR EVAL DUAL: CPT

## 2022-03-28 RX ORDER — NIRMATRELVIR AND RITONAVIR 300-100 MG
20 X 150 MG & KIT ORAL
Qty: 30 | Refills: 0 | Status: DISCONTINUED | COMMUNITY
Start: 2022-03-13

## 2022-03-28 RX ORDER — DILTIAZEM HYDROCHLORIDE 300 MG/1
300 CAPSULE, EXTENDED RELEASE ORAL DAILY
Qty: 90 | Refills: 0 | Status: DISCONTINUED | COMMUNITY
Start: 2020-12-04 | End: 2022-03-28

## 2022-03-28 RX ORDER — SODIUM FLUORIDE 1.1 G/100G
1.1 GEL, DENTIFRICE ORAL
Qty: 153 | Refills: 0 | Status: DISCONTINUED | COMMUNITY
Start: 2021-06-30

## 2022-03-29 NOTE — PHYSICAL EXAM
[General Appearance - Well Developed] : well developed [Well Groomed] : well groomed [General Appearance - Well Nourished] : well nourished [General Appearance - In No Acute Distress] : no acute distress [] : no respiratory distress [Respiration, Rhythm And Depth] : normal respiratory rhythm and effort [Exaggerated Use Of Accessory Muscles For Inspiration] : no accessory muscle use [Auscultation Breath Sounds / Voice Sounds] : lungs were clear to auscultation bilaterally [Heart Rate And Rhythm] : heart rate and rhythm were normal [Heart Sounds] : normal S1 and S2 [Murmurs] : no murmurs present [Arterial Pulses Normal] : the arterial pulses were normal [Edema] : no peripheral edema present [Abnormal Walk] : normal gait [Gait - Sufficient For Exercise Testing] : the gait was sufficient for exercise testing [FreeTextEntry1] : L and  infraclavicular surgical incisions healed well

## 2022-03-29 NOTE — DISCUSSION/SUMMARY
[FreeTextEntry1] : In summary, Ms. Orozco is a 76 year old female with CHADSVASC 3, paroxysmal atrial fibrillation and a dual chamber pacemaker. Her pacemaker interrogation shows normal device function and an atrial arrhythmia burden of 21% since her cardioversion on March 8, 2022. Review of the electrograms appear that her episodes are atrial flutter. She has been maintained on flecainide since her cardioversion and had developed COVID in the interim. We discussed options of continued observation on flecainide and see if the burden will decrease as she recovers from COVID, pursuing an atrial flutter ablation and continuing the flecainide for atrial fibrillation or performing an atrial flutter and atrial fibrillation ablation. After all questions were answered, the patient would like to continue observation on her current regimen and see if her atrial arrhythmia burden improves as she recovers from COVID. She will follow up in 2 months and we will reassess her management options based on her device interrogation. \par \par I, Dr. Moore, personally performed the evaluation and management (E/M) services for this established patient who presents today with an exacerbation of an existing condition. That E/M includes conducting the examination, assessing all new/exacerbated conditions, and establishing a new plan of care. Today, my ACP, Malia Anderson PA-C, was here to observe my evaluation and management services for this exacerbated condition to be followed going forward.\par \par

## 2022-03-29 NOTE — HISTORY OF PRESENT ILLNESS
[FreeTextEntry1] : I had the pleasure of seeing your patient Sharri Orozco today in the arrhythmia clinic of [facility]. As you well know the patient is a delightful 76 woman with a history of hypertension, hyperlipidemia, hypothyroidism, paroxysmal atrial fibrillation (on Xarelto), ILR, and sick sinus syndrome. She underwent a right sided Biotronik PPM implant approximately 12 years ago in [place] with generator change in 2018. 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 Her RV lead was capped and new RV lead implanted by Dr. Kent in [facility]. She unfortunately developed a pacemaker pocket infection initially treated with oral antibiotics but ultimately required extraction on 11/17/2020. On 11/23/2020, she underwent reimplantation of a left sided Abbott dual chamber PPM. \par \par Her PPM incision sites have healed beautifully. Over the last few checks, many device reprogramming  changes have been made to improve her her symptoms of dyspnea on exertion. This includes changes made to her rate response and making it more aggressive to allow for more chronotropic response as well as adjusting her AV delays to make it more physiologic. She reports improvement with her shortness of breath. She denies chest pain, palpitations, near syncope or syncope. \par \par She had been treated on lows dose Sotalol 40mg twice daily in the past for paroxysmal atrial fibrillation. Recently, she was found in persistent atrial fibrillation and underwent a cardioversion on March 8, 2022. She was placed on flecainide 100mg twice daily and Coreg was continued. Several days later, she was diagnosed with COVID and was treated with an anti-viral medication. Because of interaction with flecainide her flecainide was reduced to half the dose until completion of her treatment at which time the dose was increased back to 100mg BID. She denies any symptoms. \par \par Echocardiogram from 11/2020 revealed normal LV systolic function with ejection fraction of 60-65%. LA size is normal at 3.6cm. Minimal MR noted. \par \par Today, her EKG showed sinus rhythm at 60 bpm with ventricular pacing. Her blood pressure is 170/89 and her pulse is 60 bpm and regular. Her [employer]. Robson dual chamber PPM interrogation revealed a normally functioning device with normal pacing and sensing thresholds. Her intrinsic P waves are 4.4 mV, atrial impedance is 380 ohms, and atrial capture threshold is 1.25 V @ 0.5 ms. Her ventricular intrinsic amplitude is 4.6 mV, pacing impedance is 660 ohms, and pacing threshold is 1 V @ 0.5 ms. She has had episodes since her cardioversion of atrial flutter with her overall burden of 21%. Her ventricular rates in atrial flutter are mostly under 100 bpm.

## 2022-04-22 ENCOUNTER — RX RENEWAL (OUTPATIENT)
Age: 77
End: 2022-04-22
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
## 2022-05-31 ENCOUNTER — APPOINTMENT (OUTPATIENT)
Dept: ELECTROPHYSIOLOGY | Facility: CLINIC | Age: 77
End: 2022-05-31
Payer: MEDICARE

## 2022-05-31 ENCOUNTER — NON-APPOINTMENT (OUTPATIENT)
Age: 77
End: 2022-05-31

## 2022-05-31 PROCEDURE — 93294 REM INTERROG EVL PM/LDLS PM: CPT

## 2022-05-31 PROCEDURE — 93296 REM INTERROG EVL PM/IDS: CPT

## 2022-06-06 ENCOUNTER — APPOINTMENT (OUTPATIENT)
Dept: ELECTROPHYSIOLOGY | Facility: CLINIC | Age: 77
End: 2022-06-06
Payer: MEDICARE

## 2022-06-06 ENCOUNTER — NON-APPOINTMENT (OUTPATIENT)
Age: 77
End: 2022-06-06

## 2022-06-06 ENCOUNTER — RX RENEWAL (OUTPATIENT)
Age: 77
End: 2022-06-06

## 2022-06-06 VITALS
HEART RATE: 92 BPM | BODY MASS INDEX: 34.93 KG/M2 | RESPIRATION RATE: 14 BRPM | DIASTOLIC BLOOD PRESSURE: 76 MMHG | SYSTOLIC BLOOD PRESSURE: 122 MMHG | HEIGHT: 61 IN | WEIGHT: 185 LBS | OXYGEN SATURATION: 95 %

## 2022-06-06 PROCEDURE — 99213 OFFICE O/P EST LOW 20 MIN: CPT

## 2022-06-06 PROCEDURE — 93280 PM DEVICE PROGR EVAL DUAL: CPT

## 2022-06-06 PROCEDURE — 93000 ELECTROCARDIOGRAM COMPLETE: CPT | Mod: 59

## 2022-06-06 RX ORDER — FLECAINIDE ACETATE 100 MG/1
100 TABLET ORAL
Qty: 180 | Refills: 1 | Status: DISCONTINUED | COMMUNITY
Start: 2021-03-08 | End: 2022-06-06

## 2022-06-10 NOTE — DISCUSSION/SUMMARY
[FreeTextEntry1] : In summary, Ms. Orozco is a 77 year old female with CHADSVASC 3, paroxysmal atrial fibrillation and a dual chamber pacemaker. She is now having recurrent atrial flutter despite flecainide. We were able to pace terminate the Flutter. We discussed the options of staying as she is, trying a different AA drug or ablation. For now the patient would like to try amiodarone and would consider an ablation if that fails. we are therefore discontinuing the flecainide and loading her with amiodarone. She will need follow-up LFTs and TFTs. \par \par

## 2022-06-10 NOTE — HISTORY OF PRESENT ILLNESS
[FreeTextEntry1] : I had the pleasure of seeing your patient Sharri Orozco today in the arrhythmia clinic of [facility]. As you well know the patient is a delightful 77 woman with a history of hypertension, hyperlipidemia, hypothyroidism, paroxysmal atrial fibrillation (on Xarelto), ILR, and sick sinus syndrome. She underwent a right sided Biotronik PPM implant approximately 12 years ago in [place] with generator change in 2018. 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 Her RV lead was capped and new RV lead implanted by Dr. Kent in [facility]. She unfortunately developed a pacemaker pocket infection initially treated with oral antibiotics but ultimately required extraction on 11/17/2020. On 11/23/2020, she underwent reimplantation of a left sided Abbott dual chamber PPM. \par \par Her PPM incision sites have healed well. Over the last few checks, many device reprogramming  changes have been made to improve her her symptoms of dyspnea on exertion. This includes changes made to her rate response and making it more aggressive to allow for more chronotropic response as well as adjusting her AV delays to make it more physiologic. She reports improvement with her shortness of breath. She denies chest pain, palpitations, near syncope or syncope. \par \par Her PAF was treated with low dose sotolol and progressed to persistent. She under went a cardioversion on March 8, 2022. She was placed on flecainide 100mg twice daily and Coreg was continued. Several days later, she was diagnosed with COVID and was treated with an anti-viral medication. Because of interaction with flecainide her flecainide was reduced to half the dose until completion of her treatment at which time the dose was increased back to 100mg BID. She denies any symptoms. \par \par Echocardiogram from 11/2020 revealed normal LV systolic function with ejection fraction of 60-65%. LA size is normal at 3.6cm. Minimal MR noted. \par \par Overall she has been doing fairly well. She still has some LUIS but denies any lightheadedness or syncope. Today, her EKG showed atrial flutter at 99 bpm with ventricular pacing. Her blood pressure was 122/76 and her pulse was 92 bpm. Her [employer] Robson dual chamber PPM interrogation revealed a normally functioning device with normal pacing and sensing thresholds. We were able to pace terminate the atrial flutter via the pacemaker. Her intrinsic P waves are 3.1 mV, atrial impedance is 380 ohms, and atrial capture threshold is 1.25 V @ 0.5 ms. Her ventricular intrinsic amplitude is 8.2 mV, pacing impedance is 650 ohms, and pacing threshold is 1.25 V @ 0.5 ms.

## 2022-08-22 ENCOUNTER — RX RENEWAL (OUTPATIENT)
Age: 77
End: 2022-08-22

## 2022-09-06 ENCOUNTER — APPOINTMENT (OUTPATIENT)
Dept: ELECTROPHYSIOLOGY | Facility: CLINIC | Age: 77
End: 2022-09-06

## 2022-09-07 ENCOUNTER — FORM ENCOUNTER (OUTPATIENT)
Age: 77
End: 2022-09-07

## 2022-09-08 ENCOUNTER — NON-APPOINTMENT (OUTPATIENT)
Age: 77
End: 2022-09-08

## 2022-09-08 ENCOUNTER — APPOINTMENT (OUTPATIENT)
Dept: ELECTROPHYSIOLOGY | Facility: CLINIC | Age: 77
End: 2022-09-08

## 2022-09-08 PROCEDURE — 93296 REM INTERROG EVL PM/IDS: CPT

## 2022-09-08 PROCEDURE — 93294 REM INTERROG EVL PM/LDLS PM: CPT

## 2022-10-10 ENCOUNTER — NON-APPOINTMENT (OUTPATIENT)
Age: 77
End: 2022-10-10

## 2022-10-10 ENCOUNTER — APPOINTMENT (OUTPATIENT)
Dept: ELECTROPHYSIOLOGY | Facility: CLINIC | Age: 77
End: 2022-10-10

## 2022-10-10 VITALS — DIASTOLIC BLOOD PRESSURE: 96 MMHG | OXYGEN SATURATION: 98 % | SYSTOLIC BLOOD PRESSURE: 160 MMHG | HEART RATE: 59 BPM

## 2022-10-10 PROCEDURE — 93000 ELECTROCARDIOGRAM COMPLETE: CPT | Mod: 59

## 2022-10-10 PROCEDURE — 93280 PM DEVICE PROGR EVAL DUAL: CPT

## 2022-10-10 PROCEDURE — 99213 OFFICE O/P EST LOW 20 MIN: CPT

## 2022-10-10 NOTE — DISCUSSION/SUMMARY
[EKG obtained to assist in diagnosis and management of assessed problem(s)] : EKG obtained to assist in diagnosis and management of assessed problem(s) [FreeTextEntry1] : In summary, Ms. Orozco is a 77 year old female with CHADSVASC 3, paroxysmal atrial fibrillation and a dual chamber pacemaker. She is tolerating amiodarone well and it has been effective. For now she will stay as she is and in the future we can consider decreasing the amiodarone to 100mg/day. Please continue to check her LFTs and TFTs. \par

## 2022-10-10 NOTE — HISTORY OF PRESENT ILLNESS
[FreeTextEntry1] : I had the pleasure of seeing your patient Sharri Orozco today in the arrhythmia clinic of [facility]. As you well know the patient is a delightful 77 woman with a history of hypertension, hyperlipidemia, hypothyroidism, paroxysmal atrial fibrillation (on Xarelto), ILR, and sick sinus syndrome. She underwent a right sided Biotronik PPM implant approximately 12 years ago in [place] with generator change in 2018. 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 Her RV lead was capped and new RV lead implanted by Dr. Kent in [facility]. She unfortunately developed a pacemaker pocket infection initially treated with oral antibiotics but ultimately required extraction on 11/17/2020. On 11/23/2020, she underwent reimplantation of a left sided Abbott dual chamber PPM. \par \par Her PPM incision sites have healed well. Over the last few checks, many device reprogramming  changes have been made to improve her her symptoms of dyspnea on exertion. This includes changes made to her rate response and making it more aggressive to allow for more chronotropic response as well as adjusting her AV delays to make it more physiologic. She reports improvement with her shortness of breath. She denies chest pain, palpitations, near syncope or syncope. \par \par Her PAF was treated with low dose sotolol and progressed to persistent. She under went a cardioversion on March 8, 2022. She was placed on flecainide 100mg twice daily and Coreg was continued. Several days later, she was diagnosed with COVID and was treated with an anti-viral medication. Because of interaction with flecainide her flecainide was reduced to half the dose until completion of her treatment at which time the dose was increased back to 100mg BID. She broake through flecainide and we discussed the options of ablation vs amiodarone. The patient opted for amiodarone and was loaded and maintained on 200mg/day. \par \par Echocardiogram from 11/2020 revealed normal LV systolic function with ejection fraction of 60-65%. LA size is normal at 3.6cm. Minimal MR noted. \par \par Overall she has been doing quite well. She she reports having lost weight and continues to be active walking. With the weight loss she reports less LUIS. She denies any palpitations,  lightheadedness or syncope. Today, her EKG showed atrial pacing at 60BPM with a RBBB. Her blood pressure was 160/76 and her pulse was 92 bpm. Her [employer] Robson dual chamber PPM interrogation revealed a normally functioning device with normal pacing and sensing thresholds. The battery voltage estimates 7 years. Her P waves are 3.1 mV, atrial impedance is 380 ohms, and atrial capture threshold is 1.0 V @ 0.5 ms. Her ventricular intrinsic amplitude is 4.3 mV, pacing impedance is 610 ohms, and pacing threshold is 1.25 V @ 0.5 ms. There we NO episodes of atrial fibrillation.

## 2022-10-13 ENCOUNTER — APPOINTMENT (OUTPATIENT)
Dept: ELECTROPHYSIOLOGY | Facility: CLINIC | Age: 77
End: 2022-10-13

## 2022-12-08 ENCOUNTER — APPOINTMENT (OUTPATIENT)
Dept: ELECTROPHYSIOLOGY | Facility: CLINIC | Age: 77
End: 2022-12-08

## 2022-12-14 ENCOUNTER — RX RENEWAL (OUTPATIENT)
Age: 77
End: 2022-12-14

## 2022-12-16 ENCOUNTER — RX RENEWAL (OUTPATIENT)
Age: 77
End: 2022-12-16

## 2023-01-10 ENCOUNTER — NON-APPOINTMENT (OUTPATIENT)
Age: 78
End: 2023-01-10

## 2023-01-10 ENCOUNTER — APPOINTMENT (OUTPATIENT)
Dept: ELECTROPHYSIOLOGY | Facility: CLINIC | Age: 78
End: 2023-01-10
Payer: MEDICARE

## 2023-01-10 PROCEDURE — 93296 REM INTERROG EVL PM/IDS: CPT

## 2023-01-10 PROCEDURE — 93294 REM INTERROG EVL PM/LDLS PM: CPT

## 2023-01-17 ENCOUNTER — APPOINTMENT (OUTPATIENT)
Dept: ELECTROPHYSIOLOGY | Facility: CLINIC | Age: 78
End: 2023-01-17
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
## 2023-02-19 ENCOUNTER — NON-APPOINTMENT (OUTPATIENT)
Age: 78
End: 2023-02-19

## 2023-02-20 ENCOUNTER — RX RENEWAL (OUTPATIENT)
Age: 78
End: 2023-02-20

## 2023-04-10 ENCOUNTER — APPOINTMENT (OUTPATIENT)
Dept: ELECTROPHYSIOLOGY | Facility: CLINIC | Age: 78
End: 2023-04-10
Payer: MEDICARE

## 2023-04-10 ENCOUNTER — NON-APPOINTMENT (OUTPATIENT)
Age: 78
End: 2023-04-10

## 2023-04-10 VITALS — HEART RATE: 58 BPM | OXYGEN SATURATION: 99 % | SYSTOLIC BLOOD PRESSURE: 128 MMHG | DIASTOLIC BLOOD PRESSURE: 84 MMHG

## 2023-04-10 PROCEDURE — 99213 OFFICE O/P EST LOW 20 MIN: CPT

## 2023-04-10 PROCEDURE — 93000 ELECTROCARDIOGRAM COMPLETE: CPT | Mod: 59

## 2023-04-10 PROCEDURE — 93280 PM DEVICE PROGR EVAL DUAL: CPT

## 2023-04-10 RX ORDER — TAMSULOSIN HYDROCHLORIDE 0.4 MG/1
0.4 CAPSULE ORAL
Refills: 0 | Status: DISCONTINUED | COMMUNITY
Start: 2021-11-11 | End: 2023-04-10

## 2023-04-10 NOTE — HISTORY OF PRESENT ILLNESS
[FreeTextEntry1] : I had the pleasure of seeing your patient Sharri Orozco today in the arrhythmia clinic of . As you well know the patient is a delightful 77 woman with a history of hypertension, hyperlipidemia, hypothyroidism, paroxysmal atrial fibrillation (on Xarelto),and sick sinus syndrome. She underwent a right sided Biotronik PPM implant approximately 14 years ago in [place] with generator change in 2018. 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 Her RV lead was capped and new RV lead implanted by Dr. Kent in [facility]. She unfortunately developed a pacemaker pocket infection initially treated with oral antibiotics but ultimately required extraction on 11/17/2020. On 11/23/2020, she underwent reimplantation of a left sided Abbott dual chamber PPM. \par \par Her PPM incision sites have healed well. Over the last few checks, many device reprogramming  changes have been made to improve her her symptoms of dyspnea on exertion. This includes changes made to her rate response and making it more aggressive to allow for more chronotropic response as well as adjusting her AV delays to make it more physiologic. \par \par Her PAF was treated with low dose sotolol and progressed to persistent. She under went a cardioversion on March 8, 2022. She was placed on flecainide 100mg twice daily and Coreg was continued. Several days later, she was diagnosed with COVID and was treated with an anti-viral medication. Because of interaction with flecainide her flecainide was reduced to half the dose until completion of her treatment at which time the dose was increased back to 100mg BID. She broke through flecainide and we discussed the options of ablation vs amiodarone. The patient opted for amiodarone and was loaded and maintained on 200mg/day. \par \par An Echocardiogram from 11/2020 revealed normal LV systolic function with ejection fraction of 60-65%. LA size is normal at 3.6cm. Minimal MR noted. \par \par The patient reports that overall she had been doing well until January 11, 2023.  She was admitted to [facility] with a UTI and sepsis and developed heart failure in the setting.  She states she was started on spironolactone during this hospitalization and has been very thirsty since it was started.  As far as her atrial fibrillation goes she denies any palpitations, lightheadedness, presyncope or syncope.\par \par Overall she has has made a good recovery and is doing well. Today, her EKG showed atrial pacing at 60BPM with a RBBB. Her blood pressure was 128/84 and her pulse was 58 bpm. Her [employer] dual chamber PPM interrogation revealed a normally functioning device with normal pacing and sensing thresholds. The battery voltage estimates 6.5 years. Her P waves are 3.0 mV, atrial impedance is 400 ohms, and atrial capture threshold is 1.0 V @ 0.5 ms. Her ventricular intrinsic amplitude is 4.9 mV, pacing impedance is 680 ohms, and pacing threshold is 1.0 V @ 0.5 ms.  There were a few episodes of atrial fibrillation in late December and early January potentially related to her illness.  Since then there was only one brief episode in late February.

## 2023-04-10 NOTE — DISCUSSION/SUMMARY
[EKG obtained to assist in diagnosis and management of assessed problem(s)] : EKG obtained to assist in diagnosis and management of assessed problem(s) [FreeTextEntry1] : In summary, Ms. Orozco is a 77 year old female with CHADSVASC 3, paroxysmal atrial fibrillation and a dual chamber pacemaker for sinus node dysfunction.  The patient was hospitalized with an episode of urosepsis complicated by probable diastolic dysfunction induced heart failure.  She has made a good recovery.  She is having minimal episodes of atrial fibrillation and we had previously discussed trying to reduce her dose of amiodarone 200 mg a day.  The patient is amenable to this and will begin tomorrow.  While the patient is certainly an ablation candidate she is reluctant to undergo the procedure and would like to continue on medication.\par

## 2023-04-19 ENCOUNTER — RX RENEWAL (OUTPATIENT)
Age: 78
End: 2023-04-19

## 2023-04-21 ENCOUNTER — RX RENEWAL (OUTPATIENT)
Age: 78
End: 2023-04-21

## 2023-06-05 ENCOUNTER — RX RENEWAL (OUTPATIENT)
Age: 78
End: 2023-06-05

## 2023-07-10 ENCOUNTER — NON-APPOINTMENT (OUTPATIENT)
Age: 78
End: 2023-07-10

## 2023-07-10 ENCOUNTER — APPOINTMENT (OUTPATIENT)
Dept: ELECTROPHYSIOLOGY | Facility: CLINIC | Age: 78
End: 2023-07-10
Payer: MEDICARE

## 2023-07-10 PROCEDURE — 93296 REM INTERROG EVL PM/IDS: CPT

## 2023-07-10 PROCEDURE — 93294 REM INTERROG EVL PM/LDLS PM: CPT

## 2023-07-14 RX ORDER — DILTIAZEM HYDROCHLORIDE 300 MG/1
300 CAPSULE, EXTENDED RELEASE ORAL
Qty: 90 | Refills: 3 | Status: DISCONTINUED | COMMUNITY
Start: 2022-12-16 | End: 2023-07-14

## 2023-07-14 RX ORDER — AMIODARONE HYDROCHLORIDE 200 MG/1
200 TABLET ORAL DAILY
Qty: 1 | Refills: 3 | Status: DISCONTINUED | COMMUNITY
End: 2023-07-14

## 2023-09-08 ENCOUNTER — RX RENEWAL (OUTPATIENT)
Age: 78
End: 2023-09-08

## 2023-10-10 ENCOUNTER — RX RENEWAL (OUTPATIENT)
Age: 78
End: 2023-10-10

## 2023-10-10 RX ORDER — DILTIAZEM HYDROCHLORIDE 300 MG/1
300 CAPSULE, EXTENDED RELEASE ORAL
Qty: 90 | Refills: 3 | Status: ACTIVE | COMMUNITY
Start: 2021-02-24 | End: 1900-01-01

## 2023-10-16 ENCOUNTER — APPOINTMENT (OUTPATIENT)
Dept: ELECTROPHYSIOLOGY | Facility: CLINIC | Age: 78
End: 2023-10-16
Payer: MEDICARE

## 2023-10-16 VITALS
SYSTOLIC BLOOD PRESSURE: 128 MMHG | BODY MASS INDEX: 37.76 KG/M2 | RESPIRATION RATE: 14 BRPM | WEIGHT: 200 LBS | OXYGEN SATURATION: 95 % | HEART RATE: 66 BPM | HEIGHT: 61 IN | DIASTOLIC BLOOD PRESSURE: 88 MMHG

## 2023-10-16 DIAGNOSIS — I10 ESSENTIAL (PRIMARY) HYPERTENSION: ICD-10-CM

## 2023-10-16 DIAGNOSIS — I49.5 SICK SINUS SYNDROME: ICD-10-CM

## 2023-10-16 PROCEDURE — 93280 PM DEVICE PROGR EVAL DUAL: CPT

## 2023-10-16 PROCEDURE — 93000 ELECTROCARDIOGRAM COMPLETE: CPT | Mod: 59

## 2023-10-16 PROCEDURE — 99214 OFFICE O/P EST MOD 30 MIN: CPT

## 2023-10-16 RX ORDER — LISINOPRIL 20 MG/1
20 TABLET ORAL DAILY
Refills: 0 | Status: ACTIVE | COMMUNITY

## 2023-10-16 RX ORDER — FUROSEMIDE 20 MG/1
20 TABLET ORAL
Refills: 0 | Status: DISCONTINUED | COMMUNITY
Start: 2021-02-12 | End: 2023-10-16

## 2023-10-16 RX ORDER — SPIRONOLACTONE 25 MG/1
25 TABLET ORAL DAILY
Qty: 30 | Refills: 0 | Status: DISCONTINUED | COMMUNITY
End: 2023-10-16

## 2024-01-19 ENCOUNTER — NON-APPOINTMENT (OUTPATIENT)
Age: 79
End: 2024-01-19

## 2024-01-19 ENCOUNTER — APPOINTMENT (OUTPATIENT)
Dept: ELECTROPHYSIOLOGY | Facility: CLINIC | Age: 79
End: 2024-01-19
Payer: MEDICARE

## 2024-01-19 PROCEDURE — 93294 REM INTERROG EVL PM/LDLS PM: CPT

## 2024-01-19 PROCEDURE — 93296 REM INTERROG EVL PM/IDS: CPT

## 2024-01-30 ENCOUNTER — RX RENEWAL (OUTPATIENT)
Age: 79
End: 2024-01-30

## 2024-01-31 RX ORDER — APIXABAN 5 MG/1
5 TABLET, FILM COATED ORAL
Qty: 180 | Refills: 1 | Status: ACTIVE | COMMUNITY
Start: 2020-12-04 | End: 1900-01-01

## 2024-04-22 ENCOUNTER — NON-APPOINTMENT (OUTPATIENT)
Age: 79
End: 2024-04-22

## 2024-04-22 ENCOUNTER — APPOINTMENT (OUTPATIENT)
Dept: ELECTROPHYSIOLOGY | Facility: CLINIC | Age: 79
End: 2024-04-22
Payer: MEDICARE

## 2024-04-22 VITALS
DIASTOLIC BLOOD PRESSURE: 77 MMHG | HEART RATE: 60 BPM | WEIGHT: 200 LBS | OXYGEN SATURATION: 100 % | SYSTOLIC BLOOD PRESSURE: 122 MMHG | HEIGHT: 61 IN | BODY MASS INDEX: 37.76 KG/M2

## 2024-04-22 DIAGNOSIS — I48.91 UNSPECIFIED ATRIAL FIBRILLATION: ICD-10-CM

## 2024-04-22 PROCEDURE — 99213 OFFICE O/P EST LOW 20 MIN: CPT

## 2024-04-22 PROCEDURE — 93000 ELECTROCARDIOGRAM COMPLETE: CPT | Mod: 59

## 2024-04-22 RX ORDER — SPIRONOLACTONE 25 MG/1
25 TABLET ORAL
Refills: 0 | Status: ACTIVE | COMMUNITY

## 2024-04-22 NOTE — DISCUSSION/SUMMARY
[FreeTextEntry1] : In summary, Ms. Orozco is a 79-year-old woman with CHADSVASC 3, paroxysmal atrial fibrillation and a dual chamber pacemaker for sinus node dysfunction.  Overall, the patient is doing well and remains asymptomatic on low-dose amiodarone despite a few recurrences of atrial fibrillation.  We again discussed treatment options for atrial fibrillation and the patient is very reluctant to undergo an ablation.  She is doing well on low-dose amiodarone, and we will continue this regimen.  In addition, I did discuss with her weight loss and whether she would benefit from a GLP-1 drug.  She will be discussing this with her primary care doctor.  The ECG and pacemaker interrogation were distinct procedures. [EKG obtained to assist in diagnosis and management of assessed problem(s)] : EKG obtained to assist in diagnosis and management of assessed problem(s)

## 2024-04-22 NOTE — HISTORY OF PRESENT ILLNESS
[FreeTextEntry1] : I had the pleasure of seeing your patient Sharri Orozco today in the arrhythmia clinic of [facility]. As you well know the patient is a delightful 79 woman with a history of hypertension, hyperlipidemia, hypothyroidism, paroxysmal atrial fibrillation (on Xarelto), and sick sinus syndrome. She underwent a right sided Biotronik PPM implant approximately 16 years ago in [place] with generator change in 2018. 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 Her RV lead was capped, and new RV lead implanted by Dr. Kent in [facility]. She unfortunately developed a pacemaker pocket infection initially treated with oral antibiotics but ultimately required extraction on 11/17/2020. On 11/23/2020, she underwent reimplantation of a left sided Abbott dual chamber PPM.   Her PPM incision sites have healed well. Over the last few checks, many device reprogramming changes have been made to improve her symptoms of dyspnea on exertion. This includes changes made to her rate response and making it more aggressive to allow for more chronotropic response as well as adjusting her AV delays to make it more physiologic.   Her PAF was treated with low dose Sotalol and progressed to persistent. She underwent a cardioversion on March 8, 2022. She was placed on flecainide 100mg twice daily and Coreg was continued. Several days later, she was diagnosed with COVID and was treated with an anti-viral medication. Because of interaction with flecainide her flecainide was reduced to half the dose until completion of her treatment at which time the dose was increased back to 100mg BID. She broke through flecainide, and we discussed the options of ablation vs amiodarone. The patient opted for amiodarone and was loaded and maintained on 200mg/day.  An Echocardiogram from 11/2020 revealed normal LV systolic function with ejection fraction of 60-65%. LA size is normal at 3.6cm. Minimal MR noted.  The amiodarone dose was decreased to 100 mg a day.  She returns to clinic today for follow-up.  Overall, Sharri continues to do quite well.  Her biggest limitation is walking, and she feels this is due to her weight.  She denies any palpitations, lightheadedness, presyncope or syncope.  Her blood pressure today was 122/77 and her pulse was 60 and regular.  EKG revealed atrial pacing with RV sensing and a right bundle branch block.  Interrogation of her [employer] pacemaker reveals it is functioning normally.  The battery voltage is good with an estimated longevity of 5.3 years.  The P wave was 3.1 mV with impedance of 360 ohms and a threshold of 1.25 V at 0.5 ms.  The R wave is 6.4 mV impedance of 660 ohms and a threshold of 1 V at 0.5 ms.  She is a pacing over 99% of the time and V pacing 35% of the time.  She has had no interval atrial fibrillation events.

## 2024-07-21 ENCOUNTER — NON-APPOINTMENT (OUTPATIENT)
Age: 79
End: 2024-07-21

## 2024-07-22 ENCOUNTER — APPOINTMENT (OUTPATIENT)
Dept: ELECTROPHYSIOLOGY | Facility: CLINIC | Age: 79
End: 2024-07-22

## 2024-07-22 PROCEDURE — 93294 REM INTERROG EVL PM/LDLS PM: CPT

## 2024-07-22 PROCEDURE — 93296 REM INTERROG EVL PM/IDS: CPT

## 2024-10-21 ENCOUNTER — NON-APPOINTMENT (OUTPATIENT)
Age: 79
End: 2024-10-21

## 2024-10-21 ENCOUNTER — APPOINTMENT (OUTPATIENT)
Dept: ELECTROPHYSIOLOGY | Facility: CLINIC | Age: 79
End: 2024-10-21
Payer: MEDICARE

## 2024-10-21 VITALS
HEIGHT: 61 IN | SYSTOLIC BLOOD PRESSURE: 130 MMHG | DIASTOLIC BLOOD PRESSURE: 82 MMHG | BODY MASS INDEX: 37.38 KG/M2 | OXYGEN SATURATION: 99 % | WEIGHT: 198 LBS | HEART RATE: 58 BPM

## 2024-10-21 DIAGNOSIS — I48.91 UNSPECIFIED ATRIAL FIBRILLATION: ICD-10-CM

## 2024-10-21 DIAGNOSIS — I49.5 SICK SINUS SYNDROME: ICD-10-CM

## 2024-10-21 PROCEDURE — 93280 PM DEVICE PROGR EVAL DUAL: CPT

## 2024-10-21 PROCEDURE — 99214 OFFICE O/P EST MOD 30 MIN: CPT

## 2024-10-21 PROCEDURE — 93000 ELECTROCARDIOGRAM COMPLETE: CPT | Mod: 59

## 2024-12-13 ENCOUNTER — NON-APPOINTMENT (OUTPATIENT)
Age: 79
End: 2024-12-13

## 2024-12-13 ENCOUNTER — APPOINTMENT (OUTPATIENT)
Dept: ELECTROPHYSIOLOGY | Facility: CLINIC | Age: 79
End: 2024-12-13
Payer: MEDICARE

## 2024-12-13 VITALS
HEIGHT: 61 IN | SYSTOLIC BLOOD PRESSURE: 148 MMHG | DIASTOLIC BLOOD PRESSURE: 91 MMHG | OXYGEN SATURATION: 100 % | HEART RATE: 61 BPM

## 2024-12-13 DIAGNOSIS — I48.91 UNSPECIFIED ATRIAL FIBRILLATION: ICD-10-CM

## 2024-12-13 PROCEDURE — 99205 OFFICE O/P NEW HI 60 MIN: CPT

## 2024-12-13 PROCEDURE — 93000 ELECTROCARDIOGRAM COMPLETE: CPT

## 2025-03-14 ENCOUNTER — NON-APPOINTMENT (OUTPATIENT)
Age: 80
End: 2025-03-14

## 2025-03-14 ENCOUNTER — APPOINTMENT (OUTPATIENT)
Dept: ELECTROPHYSIOLOGY | Facility: CLINIC | Age: 80
End: 2025-03-14
Payer: MEDICARE

## 2025-03-14 PROCEDURE — 93294 REM INTERROG EVL PM/LDLS PM: CPT

## 2025-03-14 PROCEDURE — 93296 REM INTERROG EVL PM/IDS: CPT

## 2025-04-19 ENCOUNTER — NON-APPOINTMENT (OUTPATIENT)
Age: 80
End: 2025-04-19

## 2025-06-23 ENCOUNTER — APPOINTMENT (OUTPATIENT)
Dept: ELECTROPHYSIOLOGY | Facility: CLINIC | Age: 80
End: 2025-06-23

## 2025-07-07 ENCOUNTER — APPOINTMENT (OUTPATIENT)
Dept: ELECTROPHYSIOLOGY | Facility: CLINIC | Age: 80
End: 2025-07-07
Payer: MEDICARE

## 2025-07-07 VITALS
BODY MASS INDEX: 35.5 KG/M2 | DIASTOLIC BLOOD PRESSURE: 84 MMHG | HEART RATE: 60 BPM | HEIGHT: 61 IN | WEIGHT: 188 LBS | OXYGEN SATURATION: 98 % | SYSTOLIC BLOOD PRESSURE: 141 MMHG

## 2025-07-07 PROCEDURE — 93000 ELECTROCARDIOGRAM COMPLETE: CPT | Mod: 59

## 2025-07-07 PROCEDURE — 99214 OFFICE O/P EST MOD 30 MIN: CPT

## 2025-07-07 PROCEDURE — 93280 PM DEVICE PROGR EVAL DUAL: CPT

## 2025-07-21 ENCOUNTER — APPOINTMENT (OUTPATIENT)
Dept: ELECTROPHYSIOLOGY | Facility: CLINIC | Age: 80
End: 2025-07-21

## 2025-07-22 ENCOUNTER — APPOINTMENT (OUTPATIENT)
Dept: ELECTROPHYSIOLOGY | Facility: CLINIC | Age: 80
End: 2025-07-22
Payer: MEDICARE

## 2025-07-22 ENCOUNTER — NON-APPOINTMENT (OUTPATIENT)
Age: 80
End: 2025-07-22

## 2025-07-22 PROCEDURE — 93294 REM INTERROG EVL PM/LDLS PM: CPT

## 2025-07-22 PROCEDURE — 93296 REM INTERROG EVL PM/IDS: CPT
